# Patient Record
Sex: FEMALE | Race: BLACK OR AFRICAN AMERICAN | Employment: UNEMPLOYED | URBAN - METROPOLITAN AREA
[De-identification: names, ages, dates, MRNs, and addresses within clinical notes are randomized per-mention and may not be internally consistent; named-entity substitution may affect disease eponyms.]

---

## 2017-02-15 ENCOUNTER — TELEPHONE (OUTPATIENT)
Dept: INTERNAL MEDICINE | Age: 67
End: 2017-02-15

## 2017-02-15 ENCOUNTER — HOSPITAL ENCOUNTER (OUTPATIENT)
Dept: MAMMOGRAPHY | Age: 67
Discharge: HOME | End: 2017-02-15
Attending: INTERNAL MEDICINE | Admitting: INTERNAL MEDICINE

## 2017-02-15 DIAGNOSIS — Z12.31 VISIT FOR SCREENING MAMMOGRAM: ICD-10-CM

## 2017-02-17 ENCOUNTER — TELEPHONE (OUTPATIENT)
Dept: INTERNAL MEDICINE | Age: 67
End: 2017-02-17

## 2017-02-21 ENCOUNTER — OFFICE VISIT (OUTPATIENT)
Dept: INTERNAL MEDICINE | Age: 67
End: 2017-02-21

## 2017-02-21 VITALS
WEIGHT: 157 LBS | HEIGHT: 63 IN | DIASTOLIC BLOOD PRESSURE: 70 MMHG | BODY MASS INDEX: 27.82 KG/M2 | SYSTOLIC BLOOD PRESSURE: 138 MMHG

## 2017-02-21 DIAGNOSIS — I63.9 CEREBRAL INFARCTION, UNSPECIFIED MECHANISM (HCC): ICD-10-CM

## 2017-02-21 DIAGNOSIS — I10 ESSENTIAL HYPERTENSION: ICD-10-CM

## 2017-02-21 DIAGNOSIS — E78.2 MIXED HYPERLIPIDEMIA: ICD-10-CM

## 2017-02-21 DIAGNOSIS — Z00.00 WELL ADULT EXAM: Primary | ICD-10-CM

## 2017-02-21 DIAGNOSIS — I25.10 CORONARY ARTERY DISEASE INVOLVING NATIVE HEART WITHOUT ANGINA PECTORIS, UNSPECIFIED VESSEL OR LESION TYPE: ICD-10-CM

## 2017-02-21 DIAGNOSIS — E55.9 VITAMIN D DEFICIENCY: ICD-10-CM

## 2017-02-21 DIAGNOSIS — Z23 NEED FOR PNEUMOCOCCAL VACCINATION: ICD-10-CM

## 2017-02-21 LAB
A/G RATIO: 1.3 (ref 1.1–2.2)
ALBUMIN SERPL-MCNC: 4.3 G/DL (ref 3.4–5)
ALP BLD-CCNC: 98 U/L (ref 40–129)
ALT SERPL-CCNC: 10 U/L (ref 10–40)
ANION GAP SERPL CALCULATED.3IONS-SCNC: 14 MMOL/L (ref 3–16)
AST SERPL-CCNC: 16 U/L (ref 15–37)
BASOPHILS ABSOLUTE: 0.1 K/UL (ref 0–0.2)
BASOPHILS RELATIVE PERCENT: 1 %
BILIRUB SERPL-MCNC: <0.2 MG/DL (ref 0–1)
BILIRUBIN URINE: NEGATIVE
BLOOD, URINE: NEGATIVE
BUN BLDV-MCNC: 17 MG/DL (ref 7–20)
CALCIUM SERPL-MCNC: 9.8 MG/DL (ref 8.3–10.6)
CHLORIDE BLD-SCNC: 99 MMOL/L (ref 99–110)
CHOLESTEROL, TOTAL: 147 MG/DL (ref 0–199)
CLARITY: CLEAR
CO2: 27 MMOL/L (ref 21–32)
COLOR: YELLOW
CREAT SERPL-MCNC: 0.9 MG/DL (ref 0.6–1.2)
EOSINOPHILS ABSOLUTE: 0.1 K/UL (ref 0–0.6)
EOSINOPHILS RELATIVE PERCENT: 1 %
GFR AFRICAN AMERICAN: >60
GFR NON-AFRICAN AMERICAN: >60
GLOBULIN: 3.3 G/DL
GLUCOSE BLD-MCNC: 90 MG/DL (ref 70–99)
GLUCOSE URINE: NEGATIVE MG/DL
HCT VFR BLD CALC: 37.1 % (ref 36–48)
HDLC SERPL-MCNC: 55 MG/DL (ref 40–60)
HEMOGLOBIN: 12 G/DL (ref 12–16)
KETONES, URINE: NEGATIVE MG/DL
LDL CHOLESTEROL CALCULATED: 76 MG/DL
LEUKOCYTE ESTERASE, URINE: NEGATIVE
LYMPHOCYTES ABSOLUTE: 4.6 K/UL (ref 1–5.1)
LYMPHOCYTES RELATIVE PERCENT: 61 %
MCH RBC QN AUTO: 28.7 PG (ref 26–34)
MCHC RBC AUTO-ENTMCNC: 32.3 G/DL (ref 31–36)
MCV RBC AUTO: 88.7 FL (ref 80–100)
MICROSCOPIC EXAMINATION: NORMAL
MONOCYTES ABSOLUTE: 0.5 K/UL (ref 0–1.3)
MONOCYTES RELATIVE PERCENT: 7 %
NEUTROPHILS ABSOLUTE: 2.3 K/UL (ref 1.7–7.7)
NEUTROPHILS RELATIVE PERCENT: 30 %
NITRITE, URINE: NEGATIVE
PDW BLD-RTO: 14.8 % (ref 12.4–15.4)
PH UA: 6
PLATELET # BLD: 245 K/UL (ref 135–450)
PLATELET SLIDE REVIEW: ADEQUATE
PMV BLD AUTO: 7.6 FL (ref 5–10.5)
POTASSIUM SERPL-SCNC: 4.4 MMOL/L (ref 3.5–5.1)
PROTEIN UA: NEGATIVE MG/DL
RBC # BLD: 4.19 M/UL (ref 4–5.2)
RBC # BLD: NORMAL 10*6/UL
SLIDE REVIEW: NORMAL
SODIUM BLD-SCNC: 140 MMOL/L (ref 136–145)
SPECIFIC GRAVITY UA: 1.01
TOTAL PROTEIN: 7.6 G/DL (ref 6.4–8.2)
TRIGL SERPL-MCNC: 81 MG/DL (ref 0–150)
URINE TYPE: NORMAL
UROBILINOGEN, URINE: 0.2 E.U./DL
VLDLC SERPL CALC-MCNC: 16 MG/DL
WBC # BLD: 7.6 K/UL (ref 4–11)

## 2017-02-21 PROCEDURE — G0009 ADMIN PNEUMOCOCCAL VACCINE: HCPCS | Performed by: INTERNAL MEDICINE

## 2017-02-21 PROCEDURE — 93000 ELECTROCARDIOGRAM COMPLETE: CPT | Performed by: INTERNAL MEDICINE

## 2017-02-21 PROCEDURE — 90670 PCV13 VACCINE IM: CPT | Performed by: INTERNAL MEDICINE

## 2017-02-21 PROCEDURE — G0439 PPPS, SUBSEQ VISIT: HCPCS | Performed by: INTERNAL MEDICINE

## 2017-02-22 LAB
TSH SERPL DL<=0.05 MIU/L-ACNC: 1.21 UIU/ML (ref 0.27–4.2)
VITAMIN D 25-HYDROXY: 40.6 NG/ML

## 2017-03-01 DIAGNOSIS — E78.2 MIXED HYPERLIPIDEMIA: ICD-10-CM

## 2017-03-01 RX ORDER — SIMVASTATIN 40 MG
TABLET ORAL
Qty: 30 TABLET | Refills: 5 | Status: ON HOLD | OUTPATIENT
Start: 2017-03-01 | End: 2017-07-27 | Stop reason: HOSPADM

## 2017-05-02 DIAGNOSIS — I10 ESSENTIAL HYPERTENSION: ICD-10-CM

## 2017-05-02 RX ORDER — LISINOPRIL AND HYDROCHLOROTHIAZIDE 25; 20 MG/1; MG/1
TABLET ORAL
Qty: 90 TABLET | Refills: 0 | Status: SHIPPED | OUTPATIENT
Start: 2017-05-02 | End: 2017-08-14 | Stop reason: SDUPTHER

## 2017-05-30 ENCOUNTER — TELEPHONE (OUTPATIENT)
Dept: INTERNAL MEDICINE | Age: 67
End: 2017-05-30

## 2017-05-31 ENCOUNTER — TELEPHONE (OUTPATIENT)
Dept: INTERNAL MEDICINE | Age: 67
End: 2017-05-31

## 2017-06-06 ENCOUNTER — OFFICE VISIT (OUTPATIENT)
Dept: INTERNAL MEDICINE | Age: 67
End: 2017-06-06

## 2017-06-06 VITALS
WEIGHT: 148 LBS | DIASTOLIC BLOOD PRESSURE: 80 MMHG | BODY MASS INDEX: 25.27 KG/M2 | HEIGHT: 64 IN | SYSTOLIC BLOOD PRESSURE: 110 MMHG

## 2017-06-06 DIAGNOSIS — E78.2 MIXED HYPERLIPIDEMIA: ICD-10-CM

## 2017-06-06 DIAGNOSIS — M25.512 ACUTE PAIN OF LEFT SHOULDER: ICD-10-CM

## 2017-06-06 DIAGNOSIS — M54.5 LOW BACK PAIN, UNSPECIFIED BACK PAIN LATERALITY, UNSPECIFIED CHRONICITY, WITH SCIATICA PRESENCE UNSPECIFIED: ICD-10-CM

## 2017-06-06 DIAGNOSIS — I10 ESSENTIAL HYPERTENSION: Primary | ICD-10-CM

## 2017-06-06 PROCEDURE — 99213 OFFICE O/P EST LOW 20 MIN: CPT | Performed by: INTERNAL MEDICINE

## 2017-06-06 RX ORDER — DICLOFENAC SODIUM 75 MG/1
75 TABLET, DELAYED RELEASE ORAL 2 TIMES DAILY
Qty: 30 TABLET | Refills: 0 | Status: ON HOLD | OUTPATIENT
Start: 2017-06-06 | End: 2017-07-11 | Stop reason: HOSPADM

## 2017-06-14 ENCOUNTER — TELEPHONE (OUTPATIENT)
Dept: INTERNAL MEDICINE | Age: 67
End: 2017-06-14

## 2017-07-06 ENCOUNTER — OFFICE VISIT (OUTPATIENT)
Dept: INTERNAL MEDICINE | Age: 67
End: 2017-07-06

## 2017-07-06 ENCOUNTER — HOSPITAL ENCOUNTER (OUTPATIENT)
Dept: CT IMAGING | Age: 67
Discharge: HOME | End: 2017-07-06
Attending: INTERNAL MEDICINE | Admitting: INTERNAL MEDICINE

## 2017-07-06 VITALS
OXYGEN SATURATION: 94 % | HEIGHT: 63 IN | HEART RATE: 71 BPM | SYSTOLIC BLOOD PRESSURE: 118 MMHG | DIASTOLIC BLOOD PRESSURE: 74 MMHG | WEIGHT: 133 LBS | BODY MASS INDEX: 23.57 KG/M2

## 2017-07-06 DIAGNOSIS — R53.1 WEAKNESS: ICD-10-CM

## 2017-07-06 DIAGNOSIS — R06.02 SOB (SHORTNESS OF BREATH): ICD-10-CM

## 2017-07-06 DIAGNOSIS — R06.02 SHORTNESS OF BREATH: ICD-10-CM

## 2017-07-06 DIAGNOSIS — R06.02 SOB (SHORTNESS OF BREATH): Primary | ICD-10-CM

## 2017-07-06 DIAGNOSIS — F17.200 HEAVY SMOKER: ICD-10-CM

## 2017-07-06 DIAGNOSIS — R63.0 LOSS OF APPETITE: ICD-10-CM

## 2017-07-06 LAB
A/G RATIO: 0.8 (ref 1.1–2.2)
ALBUMIN SERPL-MCNC: 3.8 G/DL (ref 3.4–5)
ALP BLD-CCNC: 89 U/L (ref 40–129)
ALT SERPL-CCNC: 13 U/L (ref 10–40)
ANION GAP SERPL CALCULATED.3IONS-SCNC: 19 MMOL/L (ref 3–16)
AST SERPL-CCNC: 15 U/L (ref 15–37)
BASOPHILS ABSOLUTE: 0 K/UL (ref 0–0.2)
BASOPHILS RELATIVE PERCENT: 0.4 %
BILIRUB SERPL-MCNC: <0.2 MG/DL (ref 0–1)
BUN BLDV-MCNC: 38 MG/DL (ref 7–20)
CALCIUM SERPL-MCNC: 9.8 MG/DL (ref 8.3–10.6)
CHLORIDE BLD-SCNC: 94 MMOL/L (ref 99–110)
CO2: 22 MMOL/L (ref 21–32)
CREAT SERPL-MCNC: 1.2 MG/DL (ref 0.6–1.2)
EOSINOPHILS ABSOLUTE: 0 K/UL (ref 0–0.6)
EOSINOPHILS RELATIVE PERCENT: 0.3 %
GFR AFRICAN AMERICAN: 54
GFR NON-AFRICAN AMERICAN: 44
GLOBULIN: 4.6 G/DL
GLUCOSE BLD-MCNC: 92 MG/DL (ref 70–99)
HCT VFR BLD CALC: 21.7 % (ref 36–48)
HEMOGLOBIN: 7 G/DL (ref 12–16)
LYMPHOCYTES ABSOLUTE: 2.7 K/UL (ref 1–5.1)
LYMPHOCYTES RELATIVE PERCENT: 29.2 %
MCH RBC QN AUTO: 27.9 PG (ref 26–34)
MCHC RBC AUTO-ENTMCNC: 32.1 G/DL (ref 31–36)
MCV RBC AUTO: 86.7 FL (ref 80–100)
MONOCYTES ABSOLUTE: 0.7 K/UL (ref 0–1.3)
MONOCYTES RELATIVE PERCENT: 7.2 %
NEUTROPHILS ABSOLUTE: 5.8 K/UL (ref 1.7–7.7)
NEUTROPHILS RELATIVE PERCENT: 62.9 %
PDW BLD-RTO: 14.8 % (ref 12.4–15.4)
PLATELET # BLD: 537 K/UL (ref 135–450)
PMV BLD AUTO: 6.5 FL (ref 5–10.5)
POTASSIUM SERPL-SCNC: 4.7 MMOL/L (ref 3.5–5.1)
RBC # BLD: 2.5 M/UL (ref 4–5.2)
SODIUM BLD-SCNC: 135 MMOL/L (ref 136–145)
TOTAL PROTEIN: 8.4 G/DL (ref 6.4–8.2)
TSH SERPL DL<=0.05 MIU/L-ACNC: 1.26 UIU/ML (ref 0.27–4.2)
WBC # BLD: 9.2 K/UL (ref 4–11)

## 2017-07-06 PROCEDURE — 99213 OFFICE O/P EST LOW 20 MIN: CPT | Performed by: INTERNAL MEDICINE

## 2017-07-06 ASSESSMENT — ENCOUNTER SYMPTOMS
BACK PAIN: 0
BLOOD IN STOOL: 0
VOMITING: 0
NAUSEA: 0
SHORTNESS OF BREATH: 1
ABDOMINAL PAIN: 0
CHEST TIGHTNESS: 0
DIARRHEA: 0
COUGH: 0

## 2017-07-06 ASSESSMENT — PATIENT HEALTH QUESTIONNAIRE - PHQ9
2. FEELING DOWN, DEPRESSED OR HOPELESS: 0
SUM OF ALL RESPONSES TO PHQ QUESTIONS 1-9: 0
SUM OF ALL RESPONSES TO PHQ9 QUESTIONS 1 & 2: 0
1. LITTLE INTEREST OR PLEASURE IN DOING THINGS: 0

## 2017-07-07 ENCOUNTER — TELEPHONE (OUTPATIENT)
Dept: INTERNAL MEDICINE | Age: 67
End: 2017-07-07

## 2017-07-07 PROBLEM — K92.0 GASTROINTESTINAL HEMORRHAGE WITH HEMATEMESIS: Status: ACTIVE | Noted: 2017-07-07

## 2017-07-13 ENCOUNTER — TELEPHONE (OUTPATIENT)
Dept: INTERNAL MEDICINE | Age: 67
End: 2017-07-13

## 2017-07-14 ENCOUNTER — TELEPHONE (OUTPATIENT)
Dept: INTERNAL MEDICINE | Age: 67
End: 2017-07-14

## 2017-07-21 ENCOUNTER — OFFICE VISIT (OUTPATIENT)
Dept: INTERNAL MEDICINE | Age: 67
End: 2017-07-21

## 2017-07-21 ENCOUNTER — HOSPITAL ENCOUNTER (OUTPATIENT)
Dept: CT IMAGING | Age: 67
Discharge: HOME | End: 2017-07-21
Attending: INTERNAL MEDICINE | Admitting: INTERNAL MEDICINE

## 2017-07-21 VITALS
OXYGEN SATURATION: 90 % | HEART RATE: 94 BPM | SYSTOLIC BLOOD PRESSURE: 118 MMHG | BODY MASS INDEX: 21.51 KG/M2 | HEIGHT: 64 IN | DIASTOLIC BLOOD PRESSURE: 68 MMHG | WEIGHT: 126 LBS

## 2017-07-21 DIAGNOSIS — R06.09 EXERTIONAL DYSPNEA: ICD-10-CM

## 2017-07-21 DIAGNOSIS — R10.31 RIGHT LOWER QUADRANT ABDOMINAL PAIN: ICD-10-CM

## 2017-07-21 DIAGNOSIS — R10.31 RLQ ABDOMINAL PAIN: ICD-10-CM

## 2017-07-21 DIAGNOSIS — R10.31 RLQ ABDOMINAL PAIN: Primary | ICD-10-CM

## 2017-07-21 DIAGNOSIS — K59.09 OTHER CONSTIPATION: ICD-10-CM

## 2017-07-21 DIAGNOSIS — R63.0 LOSS OF APPETITE: ICD-10-CM

## 2017-07-21 DIAGNOSIS — R10.31 RIGHT LOWER QUADRANT PAIN: ICD-10-CM

## 2017-07-21 DIAGNOSIS — R63.4 LOSS OF WEIGHT: ICD-10-CM

## 2017-07-21 DIAGNOSIS — R06.09 DYSPNEA ON EXERTION: ICD-10-CM

## 2017-07-21 DIAGNOSIS — Z91.81 AT HIGH RISK FOR FALLS: ICD-10-CM

## 2017-07-21 LAB
A/G RATIO: 0.7 (ref 1.1–2.2)
ALBUMIN SERPL-MCNC: 3.7 G/DL (ref 3.4–5)
ALP BLD-CCNC: 83 U/L (ref 40–129)
ALT SERPL-CCNC: 13 U/L (ref 10–40)
ANION GAP SERPL CALCULATED.3IONS-SCNC: 21 MMOL/L (ref 3–16)
AST SERPL-CCNC: 24 U/L (ref 15–37)
BASOPHILS ABSOLUTE: 0 K/UL (ref 0–0.2)
BASOPHILS RELATIVE PERCENT: 0.4 %
BILIRUB SERPL-MCNC: <0.2 MG/DL (ref 0–1)
BUN BLDV-MCNC: 36 MG/DL (ref 7–20)
CALCIUM SERPL-MCNC: 10.2 MG/DL (ref 8.3–10.6)
CHLORIDE BLD-SCNC: 94 MMOL/L (ref 99–110)
CO2: 23 MMOL/L (ref 21–32)
CREAT SERPL-MCNC: 1.2 MG/DL (ref 0.6–1.2)
EOSINOPHILS ABSOLUTE: 0.1 K/UL (ref 0–0.6)
EOSINOPHILS RELATIVE PERCENT: 0.5 %
GFR AFRICAN AMERICAN: 54
GFR NON-AFRICAN AMERICAN: 44
GLOBULIN: 5 G/DL
GLUCOSE BLD-MCNC: 92 MG/DL (ref 70–99)
HCT VFR BLD CALC: 26.7 % (ref 36–48)
HEMOGLOBIN: 8.5 G/DL (ref 12–16)
LIPASE: 10 U/L (ref 13–60)
LYMPHOCYTES ABSOLUTE: 3 K/UL (ref 1–5.1)
LYMPHOCYTES RELATIVE PERCENT: 25.4 %
MAGNESIUM: 2.2 MG/DL (ref 1.8–2.4)
MCH RBC QN AUTO: 27.8 PG (ref 26–34)
MCHC RBC AUTO-ENTMCNC: 31.7 G/DL (ref 31–36)
MCV RBC AUTO: 87.7 FL (ref 80–100)
MONOCYTES ABSOLUTE: 0.9 K/UL (ref 0–1.3)
MONOCYTES RELATIVE PERCENT: 7.7 %
NEUTROPHILS ABSOLUTE: 7.7 K/UL (ref 1.7–7.7)
NEUTROPHILS RELATIVE PERCENT: 66 %
PDW BLD-RTO: 15.6 % (ref 12.4–15.4)
PLATELET # BLD: 476 K/UL (ref 135–450)
PMV BLD AUTO: 7.5 FL (ref 5–10.5)
POTASSIUM SERPL-SCNC: 5.1 MMOL/L (ref 3.5–5.1)
RBC # BLD: 3.04 M/UL (ref 4–5.2)
SODIUM BLD-SCNC: 138 MMOL/L (ref 136–145)
TOTAL PROTEIN: 8.7 G/DL (ref 6.4–8.2)
WBC # BLD: 11.7 K/UL (ref 4–11)

## 2017-07-21 PROCEDURE — 99213 OFFICE O/P EST LOW 20 MIN: CPT | Performed by: INTERNAL MEDICINE

## 2017-07-21 RX ORDER — METRONIDAZOLE 500 MG/1
500 TABLET ORAL 3 TIMES DAILY
Qty: 30 TABLET | Refills: 0 | Status: ON HOLD | OUTPATIENT
Start: 2017-07-21 | End: 2017-07-27

## 2017-07-21 RX ORDER — CIPROFLOXACIN 500 MG/1
500 TABLET, FILM COATED ORAL 2 TIMES DAILY
Qty: 20 TABLET | Refills: 0 | Status: ON HOLD | OUTPATIENT
Start: 2017-07-21 | End: 2017-07-27

## 2017-07-21 RX ORDER — TRAMADOL HYDROCHLORIDE 50 MG/1
50 TABLET ORAL EVERY 12 HOURS PRN
Qty: 16 TABLET | Refills: 0 | Status: SHIPPED | OUTPATIENT
Start: 2017-07-21 | End: 2017-07-31

## 2017-07-21 ASSESSMENT — ENCOUNTER SYMPTOMS
NAUSEA: 0
ABDOMINAL PAIN: 0
SORE THROAT: 0
BLOOD IN STOOL: 0
DIARRHEA: 0
COUGH: 0
SHORTNESS OF BREATH: 1
VOMITING: 0
CHEST TIGHTNESS: 0

## 2017-07-22 ENCOUNTER — HOSPITAL ENCOUNTER (OUTPATIENT)
Dept: CT IMAGING | Age: 67
Discharge: HOME | End: 2017-07-22
Attending: INTERNAL MEDICINE | Admitting: INTERNAL MEDICINE

## 2017-07-22 DIAGNOSIS — R10.31 RIGHT LOWER QUADRANT PAIN: ICD-10-CM

## 2017-07-22 DIAGNOSIS — R63.0 LOSS OF APPETITE: ICD-10-CM

## 2017-07-22 DIAGNOSIS — R10.31 RIGHT LOWER QUADRANT ABDOMINAL PAIN: ICD-10-CM

## 2017-07-22 DIAGNOSIS — R06.09 DYSPNEA ON EXERTION: ICD-10-CM

## 2017-07-22 DIAGNOSIS — R10.31 RLQ ABDOMINAL PAIN: ICD-10-CM

## 2017-07-22 DIAGNOSIS — R63.4 LOSS OF WEIGHT: ICD-10-CM

## 2017-07-23 PROBLEM — R63.4 LOSS OF WEIGHT: Status: ACTIVE | Noted: 2017-07-23

## 2017-07-27 PROBLEM — K57.33 DIVERTICULITIS OF LARGE INTESTINE WITHOUT PERFORATION OR ABSCESS WITH BLEEDING: Status: ACTIVE | Noted: 2017-07-27

## 2017-07-27 PROBLEM — D50.9 IRON DEFICIENCY ANEMIA: Status: ACTIVE | Noted: 2017-07-27

## 2017-07-28 ENCOUNTER — TELEPHONE (OUTPATIENT)
Dept: INTERNAL MEDICINE | Age: 67
End: 2017-07-28

## 2017-07-28 ENCOUNTER — TELEPHONE (OUTPATIENT)
Dept: PHARMACY | Facility: CLINIC | Age: 67
End: 2017-07-28

## 2017-07-28 ENCOUNTER — CARE COORDINATION (OUTPATIENT)
Dept: CASE MANAGEMENT | Age: 67
End: 2017-07-28

## 2017-07-28 RX ORDER — PROMETHAZINE HYDROCHLORIDE 12.5 MG/1
12.5 TABLET ORAL EVERY 6 HOURS PRN
Qty: 20 TABLET | Refills: 0 | Status: SHIPPED | OUTPATIENT
Start: 2017-07-28 | End: 2017-08-04

## 2017-07-31 ENCOUNTER — CARE COORDINATION (OUTPATIENT)
Dept: CASE MANAGEMENT | Age: 67
End: 2017-07-31

## 2017-08-03 ENCOUNTER — CARE COORDINATION (OUTPATIENT)
Dept: CASE MANAGEMENT | Age: 67
End: 2017-08-03

## 2017-08-08 ENCOUNTER — TELEPHONE (OUTPATIENT)
Dept: INTERNAL MEDICINE | Age: 67
End: 2017-08-08

## 2017-08-08 RX ORDER — FLUCONAZOLE 150 MG/1
150 TABLET ORAL SEE ADMIN INSTRUCTIONS
Qty: 2 TABLET | Refills: 3 | Status: SHIPPED | OUTPATIENT
Start: 2017-08-08 | End: 2017-08-09

## 2017-08-14 DIAGNOSIS — I10 ESSENTIAL HYPERTENSION: ICD-10-CM

## 2017-08-14 RX ORDER — LISINOPRIL AND HYDROCHLOROTHIAZIDE 25; 20 MG/1; MG/1
TABLET ORAL
Qty: 90 TABLET | Refills: 0 | Status: SHIPPED | OUTPATIENT
Start: 2017-08-14 | End: 2017-11-13 | Stop reason: SDUPTHER

## 2017-11-13 DIAGNOSIS — I10 ESSENTIAL HYPERTENSION: ICD-10-CM

## 2017-11-14 RX ORDER — LISINOPRIL AND HYDROCHLOROTHIAZIDE 25; 20 MG/1; MG/1
TABLET ORAL
Qty: 90 TABLET | Refills: 0 | Status: SHIPPED | OUTPATIENT
Start: 2017-11-14 | End: 2018-02-13 | Stop reason: SDUPTHER

## 2017-11-30 ENCOUNTER — TELEPHONE (OUTPATIENT)
Dept: INTERNAL MEDICINE | Age: 67
End: 2017-11-30

## 2017-11-30 RX ORDER — AMLODIPINE BESYLATE 5 MG/1
5 TABLET ORAL DAILY
Qty: 30 TABLET | Refills: 3 | Status: SHIPPED | OUTPATIENT
Start: 2017-11-30 | End: 2018-03-28 | Stop reason: SDUPTHER

## 2017-11-30 RX ORDER — ATORVASTATIN CALCIUM 40 MG/1
40 TABLET, FILM COATED ORAL NIGHTLY
Qty: 30 TABLET | Refills: 3 | Status: SHIPPED | OUTPATIENT
Start: 2017-11-30 | End: 2018-03-26 | Stop reason: SDUPTHER

## 2017-11-30 NOTE — TELEPHONE ENCOUNTER
Pt needs refill on:  amLODIPine (NORVASC) 5 MG tablet   atorvastatin (LIPITOR) 40 MG tablet . Pharmacy on file verified.   Please call

## 2017-12-13 ENCOUNTER — NURSE ONLY (OUTPATIENT)
Dept: INTERNAL MEDICINE | Age: 67
End: 2017-12-13

## 2017-12-13 DIAGNOSIS — Z23 NEED FOR INFLUENZA VACCINATION: Primary | ICD-10-CM

## 2017-12-13 PROCEDURE — G0008 ADMIN INFLUENZA VIRUS VAC: HCPCS | Performed by: INTERNAL MEDICINE

## 2017-12-13 PROCEDURE — 90662 IIV NO PRSV INCREASED AG IM: CPT | Performed by: INTERNAL MEDICINE

## 2017-12-14 NOTE — PROGRESS NOTES
Vaccine Information Sheet, \"Influenza - Inactivated\"  given to Pinky Casiano, or parent/legal guardian of  Pinky Casiano and verbalized understanding. Patient responses:    Have you ever had a reaction to a flu vaccine? No  Are you able to eat eggs without adverse effects? Yes  Do you have any current illness? No  Have you ever had [place] Syndrome? No    Flu vaccine given per order. Please see immunization tab.

## 2018-02-13 DIAGNOSIS — I10 ESSENTIAL HYPERTENSION: ICD-10-CM

## 2018-02-13 RX ORDER — LISINOPRIL AND HYDROCHLOROTHIAZIDE 25; 20 MG/1; MG/1
TABLET ORAL
Qty: 90 TABLET | Refills: 0 | Status: SHIPPED | OUTPATIENT
Start: 2018-02-13 | End: 2018-05-16 | Stop reason: SDUPTHER

## 2018-03-26 RX ORDER — ATORVASTATIN CALCIUM 40 MG/1
40 TABLET, FILM COATED ORAL NIGHTLY
Qty: 90 TABLET | Refills: 0 | Status: SHIPPED | OUTPATIENT
Start: 2018-03-26 | End: 2018-06-18 | Stop reason: SDUPTHER

## 2018-03-29 RX ORDER — AMLODIPINE BESYLATE 5 MG/1
TABLET ORAL
Qty: 30 TABLET | Refills: 3 | Status: SHIPPED | OUTPATIENT
Start: 2018-03-29 | End: 2018-07-26 | Stop reason: SDUPTHER

## 2018-05-16 DIAGNOSIS — I10 ESSENTIAL HYPERTENSION: ICD-10-CM

## 2018-05-16 RX ORDER — LISINOPRIL AND HYDROCHLOROTHIAZIDE 25; 20 MG/1; MG/1
TABLET ORAL
Qty: 90 TABLET | Refills: 0 | Status: SHIPPED | OUTPATIENT
Start: 2018-05-16 | End: 2018-06-18 | Stop reason: SDUPTHER

## 2018-06-18 ENCOUNTER — TELEPHONE (OUTPATIENT)
Dept: INTERNAL MEDICINE | Age: 68
End: 2018-06-18

## 2018-06-18 DIAGNOSIS — I10 ESSENTIAL HYPERTENSION: ICD-10-CM

## 2018-06-18 RX ORDER — LISINOPRIL AND HYDROCHLOROTHIAZIDE 25; 20 MG/1; MG/1
TABLET ORAL
Qty: 90 TABLET | Refills: 0 | Status: SHIPPED | OUTPATIENT
Start: 2018-06-18 | End: 2018-11-08 | Stop reason: SDUPTHER

## 2018-06-18 RX ORDER — ATORVASTATIN CALCIUM 40 MG/1
40 TABLET, FILM COATED ORAL NIGHTLY
Qty: 90 TABLET | Refills: 0 | Status: SHIPPED | OUTPATIENT
Start: 2018-06-18 | End: 2018-09-27 | Stop reason: SDUPTHER

## 2018-07-26 ENCOUNTER — OFFICE VISIT (OUTPATIENT)
Dept: INTERNAL MEDICINE | Age: 68
End: 2018-07-26

## 2018-07-26 VITALS
BODY MASS INDEX: 25.44 KG/M2 | DIASTOLIC BLOOD PRESSURE: 76 MMHG | SYSTOLIC BLOOD PRESSURE: 120 MMHG | WEIGHT: 149 LBS | HEIGHT: 64 IN

## 2018-07-26 DIAGNOSIS — R53.83 FATIGUE, UNSPECIFIED TYPE: ICD-10-CM

## 2018-07-26 DIAGNOSIS — I10 ESSENTIAL HYPERTENSION: ICD-10-CM

## 2018-07-26 DIAGNOSIS — E78.00 PURE HYPERCHOLESTEROLEMIA: Primary | ICD-10-CM

## 2018-07-26 DIAGNOSIS — E78.00 PURE HYPERCHOLESTEROLEMIA: ICD-10-CM

## 2018-07-26 LAB
A/G RATIO: 1.5 (ref 1.1–2.2)
ALBUMIN SERPL-MCNC: 4.5 G/DL (ref 3.4–5)
ALP BLD-CCNC: 112 U/L (ref 40–129)
ALT SERPL-CCNC: 11 U/L (ref 10–40)
ANION GAP SERPL CALCULATED.3IONS-SCNC: 13 MMOL/L (ref 3–16)
AST SERPL-CCNC: 18 U/L (ref 15–37)
BASOPHILS ABSOLUTE: 0 K/UL (ref 0–0.2)
BASOPHILS RELATIVE PERCENT: 0 %
BILIRUB SERPL-MCNC: <0.2 MG/DL (ref 0–1)
BUN BLDV-MCNC: 18 MG/DL (ref 7–20)
CALCIUM SERPL-MCNC: 9.7 MG/DL (ref 8.3–10.6)
CHLORIDE BLD-SCNC: 102 MMOL/L (ref 99–110)
CHOLESTEROL, TOTAL: 129 MG/DL (ref 0–199)
CO2: 25 MMOL/L (ref 21–32)
CREAT SERPL-MCNC: 1 MG/DL (ref 0.6–1.2)
CRENATED RBC'S: ABNORMAL
EOSINOPHILS ABSOLUTE: 0 K/UL (ref 0–0.6)
EOSINOPHILS RELATIVE PERCENT: 0 %
GFR AFRICAN AMERICAN: >60
GFR NON-AFRICAN AMERICAN: 55
GLOBULIN: 3.1 G/DL
GLUCOSE BLD-MCNC: 122 MG/DL (ref 70–99)
HCT VFR BLD CALC: 35 % (ref 36–48)
HDLC SERPL-MCNC: 55 MG/DL (ref 40–60)
HEMOGLOBIN: 11.6 G/DL (ref 12–16)
LDL CHOLESTEROL CALCULATED: 61 MG/DL
LYMPHOCYTES ABSOLUTE: 3.9 K/UL (ref 1–5.1)
LYMPHOCYTES RELATIVE PERCENT: 62 %
MCH RBC QN AUTO: 29.8 PG (ref 26–34)
MCHC RBC AUTO-ENTMCNC: 33 G/DL (ref 31–36)
MCV RBC AUTO: 90.1 FL (ref 80–100)
MONOCYTES ABSOLUTE: 0.5 K/UL (ref 0–1.3)
MONOCYTES RELATIVE PERCENT: 8 %
NEUTROPHILS ABSOLUTE: 1.9 K/UL (ref 1.7–7.7)
NEUTROPHILS RELATIVE PERCENT: 30 %
PDW BLD-RTO: 14.4 % (ref 12.4–15.4)
PLATELET # BLD: 217 K/UL (ref 135–450)
PLATELET SLIDE REVIEW: ADEQUATE
PMV BLD AUTO: 8 FL (ref 5–10.5)
POTASSIUM SERPL-SCNC: 4.6 MMOL/L (ref 3.5–5.1)
RBC # BLD: 3.88 M/UL (ref 4–5.2)
SLIDE REVIEW: ABNORMAL
SODIUM BLD-SCNC: 140 MMOL/L (ref 136–145)
TOTAL PROTEIN: 7.6 G/DL (ref 6.4–8.2)
TRIGL SERPL-MCNC: 66 MG/DL (ref 0–150)
VLDLC SERPL CALC-MCNC: 13 MG/DL
WBC # BLD: 6.3 K/UL (ref 4–11)

## 2018-07-26 PROCEDURE — 4004F PT TOBACCO SCREEN RCVD TLK: CPT | Performed by: INTERNAL MEDICINE

## 2018-07-26 PROCEDURE — 99213 OFFICE O/P EST LOW 20 MIN: CPT | Performed by: INTERNAL MEDICINE

## 2018-07-26 PROCEDURE — 3017F COLORECTAL CA SCREEN DOC REV: CPT | Performed by: INTERNAL MEDICINE

## 2018-07-26 PROCEDURE — G8399 PT W/DXA RESULTS DOCUMENT: HCPCS | Performed by: INTERNAL MEDICINE

## 2018-07-26 PROCEDURE — 1123F ACP DISCUSS/DSCN MKR DOCD: CPT | Performed by: INTERNAL MEDICINE

## 2018-07-26 PROCEDURE — G8419 CALC BMI OUT NRM PARAM NOF/U: HCPCS | Performed by: INTERNAL MEDICINE

## 2018-07-26 PROCEDURE — 96372 THER/PROPH/DIAG INJ SC/IM: CPT | Performed by: INTERNAL MEDICINE

## 2018-07-26 PROCEDURE — G8599 NO ASA/ANTIPLAT THER USE RNG: HCPCS | Performed by: INTERNAL MEDICINE

## 2018-07-26 PROCEDURE — 1101F PT FALLS ASSESS-DOCD LE1/YR: CPT | Performed by: INTERNAL MEDICINE

## 2018-07-26 PROCEDURE — 1090F PRES/ABSN URINE INCON ASSESS: CPT | Performed by: INTERNAL MEDICINE

## 2018-07-26 PROCEDURE — 4040F PNEUMOC VAC/ADMIN/RCVD: CPT | Performed by: INTERNAL MEDICINE

## 2018-07-26 PROCEDURE — G8427 DOCREV CUR MEDS BY ELIG CLIN: HCPCS | Performed by: INTERNAL MEDICINE

## 2018-07-26 RX ORDER — CYANOCOBALAMIN 1000 UG/ML
1000 INJECTION INTRAMUSCULAR; SUBCUTANEOUS ONCE
Status: COMPLETED | OUTPATIENT
Start: 2018-07-26 | End: 2018-07-26

## 2018-07-26 RX ORDER — AMLODIPINE BESYLATE 5 MG/1
TABLET ORAL
Qty: 90 TABLET | Refills: 3 | Status: SHIPPED | OUTPATIENT
Start: 2018-07-26 | End: 2019-07-24 | Stop reason: SDUPTHER

## 2018-07-26 RX ADMIN — CYANOCOBALAMIN 1000 MCG: 1000 INJECTION INTRAMUSCULAR; SUBCUTANEOUS at 15:49

## 2018-07-26 NOTE — PROGRESS NOTES
Topics    Smoking status: Current Every Day Smoker     Packs/day: 0.50     Years: 45.00    Smokeless tobacco: Never Used      Comment: smokes one cig per day     Alcohol use No    Drug use: No    Sexual activity: No     Other Topics Concern    None     Social History Narrative    None         Allergies:  Patient has no known allergies. Current Medications:    Prior to Admission medications    Medication Sig Start Date End Date Taking? Authorizing Provider   amLODIPine (NORVASC) 5 MG tablet TAKE ONE TABLET BY MOUTH ONCE DAILY 7/26/18  Yes Serge Seymour MD   metoprolol tartrate (LOPRESSOR) 25 MG tablet TAKE 1 TABLET BY MOUTH TWICE DAILY 6/18/18  Yes Serge Seymour MD   lisinopril-hydrochlorothiazide (PRINZIDE;ZESTORETIC) 20-25 MG per tablet TAKE 1 TABLET BY MOUTH ONCE DAILY 6/18/18  Yes Serge Seymour MD   atorvastatin (LIPITOR) 40 MG tablet Take 1 tablet by mouth nightly 6/18/18  Yes Serge Seymour MD       Physical Exam:  Vital Signs: /76   Ht 5' 4\" (1.626 m)   Wt 149 lb (67.6 kg)   BMI 25.58 kg/m²   General: Patient appears  non-toxic  HENT: Atraumatic, normocephalic, oral mucosa moist  Lungs:  Clear bilaterally  Heart: Regular rate and rhythm  Abdomen: Non-distended, soft, non-tender  Extremities: No edema  Neuro: Nonfocal    Medical Decision Making and Plan:  Pertinent Labs & Imaging studies reviewed. (See chart for details)  Fasting blood studies are pending    1. Essential hypertension  This problem is stable continue present meds  - CBC Auto Differential; Future  - Comprehensive Metabolic Panel; Future  - Lipid Panel; Future    2. Pure hypercholesterolemia  Problem is stable check above labs continue present meds for now  - CBC Auto Differential; Future  - Comprehensive Metabolic Panel; Future  - Lipid Panel;  Future

## 2018-07-27 DIAGNOSIS — D64.9 ANEMIA, UNSPECIFIED TYPE: ICD-10-CM

## 2018-07-27 DIAGNOSIS — D64.9 ANEMIA, UNSPECIFIED TYPE: Primary | ICD-10-CM

## 2018-07-27 LAB
IRON SATURATION: 22 % (ref 15–50)
IRON: 70 UG/DL (ref 37–145)
TOTAL IRON BINDING CAPACITY: 320 UG/DL (ref 260–445)

## 2018-09-27 RX ORDER — ATORVASTATIN CALCIUM 40 MG/1
40 TABLET, FILM COATED ORAL NIGHTLY
Qty: 90 TABLET | Refills: 2 | Status: SHIPPED | OUTPATIENT
Start: 2018-09-27 | End: 2019-06-26 | Stop reason: SDUPTHER

## 2018-10-30 ENCOUNTER — NURSE ONLY (OUTPATIENT)
Dept: INTERNAL MEDICINE CLINIC | Age: 68
End: 2018-10-30
Payer: MEDICARE

## 2018-10-30 DIAGNOSIS — Z23 NEED FOR INFLUENZA VACCINATION: Primary | ICD-10-CM

## 2018-10-30 PROCEDURE — G0008 ADMIN INFLUENZA VIRUS VAC: HCPCS | Performed by: INTERNAL MEDICINE

## 2018-10-30 PROCEDURE — 90662 IIV NO PRSV INCREASED AG IM: CPT | Performed by: INTERNAL MEDICINE

## 2018-10-30 ASSESSMENT — PATIENT HEALTH QUESTIONNAIRE - PHQ9
SUM OF ALL RESPONSES TO PHQ9 QUESTIONS 1 & 2: 0
1. LITTLE INTEREST OR PLEASURE IN DOING THINGS: 0
SUM OF ALL RESPONSES TO PHQ QUESTIONS 1-9: 0
2. FEELING DOWN, DEPRESSED OR HOPELESS: 0
SUM OF ALL RESPONSES TO PHQ QUESTIONS 1-9: 0

## 2018-11-08 DIAGNOSIS — I10 ESSENTIAL HYPERTENSION: ICD-10-CM

## 2018-11-08 RX ORDER — LISINOPRIL AND HYDROCHLOROTHIAZIDE 25; 20 MG/1; MG/1
TABLET ORAL
Qty: 90 TABLET | Refills: 0 | Status: SHIPPED | OUTPATIENT
Start: 2018-11-08 | End: 2019-02-07 | Stop reason: SDUPTHER

## 2019-01-28 ENCOUNTER — OFFICE VISIT (OUTPATIENT)
Dept: INTERNAL MEDICINE CLINIC | Age: 69
End: 2019-01-28
Payer: MEDICARE

## 2019-01-28 VITALS
HEIGHT: 64 IN | BODY MASS INDEX: 25.61 KG/M2 | DIASTOLIC BLOOD PRESSURE: 68 MMHG | SYSTOLIC BLOOD PRESSURE: 110 MMHG | WEIGHT: 150 LBS

## 2019-01-28 DIAGNOSIS — I25.10 CORONARY ARTERY DISEASE INVOLVING NATIVE HEART WITHOUT ANGINA PECTORIS, UNSPECIFIED VESSEL OR LESION TYPE: ICD-10-CM

## 2019-01-28 DIAGNOSIS — Z00.00 PE (PHYSICAL EXAM), ANNUAL: Primary | ICD-10-CM

## 2019-01-28 DIAGNOSIS — I10 ESSENTIAL HYPERTENSION: ICD-10-CM

## 2019-01-28 DIAGNOSIS — E55.9 VITAMIN D DEFICIENCY: ICD-10-CM

## 2019-01-28 DIAGNOSIS — Z00.00 WELLNESS EXAMINATION: ICD-10-CM

## 2019-01-28 DIAGNOSIS — Z00.00 PE (PHYSICAL EXAM), ANNUAL: ICD-10-CM

## 2019-01-28 DIAGNOSIS — D50.8 OTHER IRON DEFICIENCY ANEMIA: ICD-10-CM

## 2019-01-28 PROBLEM — R63.4 ABNORMAL LOSS OF WEIGHT: Status: RESOLVED | Noted: 2017-07-23 | Resolved: 2019-01-28

## 2019-01-28 PROBLEM — K92.0 GASTROINTESTINAL HEMORRHAGE WITH HEMATEMESIS: Status: RESOLVED | Noted: 2017-07-07 | Resolved: 2019-01-28

## 2019-01-28 LAB
A/G RATIO: 1.3 (ref 1.1–2.2)
ALBUMIN SERPL-MCNC: 4.5 G/DL (ref 3.4–5)
ALP BLD-CCNC: 115 U/L (ref 40–129)
ALT SERPL-CCNC: 10 U/L (ref 10–40)
ANION GAP SERPL CALCULATED.3IONS-SCNC: 14 MMOL/L (ref 3–16)
AST SERPL-CCNC: 16 U/L (ref 15–37)
BASOPHILS ABSOLUTE: 0 K/UL (ref 0–0.2)
BASOPHILS RELATIVE PERCENT: 0.5 %
BILIRUB SERPL-MCNC: 0.3 MG/DL (ref 0–1)
BILIRUBIN URINE: NEGATIVE
BLOOD, URINE: NEGATIVE
BUN BLDV-MCNC: 15 MG/DL (ref 7–20)
CALCIUM SERPL-MCNC: 9.9 MG/DL (ref 8.3–10.6)
CHLORIDE BLD-SCNC: 102 MMOL/L (ref 99–110)
CHOLESTEROL, TOTAL: 127 MG/DL (ref 0–199)
CLARITY: CLEAR
CO2: 25 MMOL/L (ref 21–32)
COLOR: YELLOW
CREAT SERPL-MCNC: 1 MG/DL (ref 0.6–1.2)
EOSINOPHILS ABSOLUTE: 0 K/UL (ref 0–0.6)
EOSINOPHILS RELATIVE PERCENT: 0.8 %
GFR AFRICAN AMERICAN: >60
GFR NON-AFRICAN AMERICAN: 55
GLOBULIN: 3.5 G/DL
GLUCOSE BLD-MCNC: 90 MG/DL (ref 70–99)
GLUCOSE URINE: NEGATIVE MG/DL
HCT VFR BLD CALC: 35.8 % (ref 36–48)
HDLC SERPL-MCNC: 49 MG/DL (ref 40–60)
HEMOGLOBIN: 11.8 G/DL (ref 12–16)
KETONES, URINE: NEGATIVE MG/DL
LDL CHOLESTEROL CALCULATED: 63 MG/DL
LEUKOCYTE ESTERASE, URINE: NEGATIVE
LYMPHOCYTES ABSOLUTE: 3.4 K/UL (ref 1–5.1)
LYMPHOCYTES RELATIVE PERCENT: 52.6 %
MCH RBC QN AUTO: 30.1 PG (ref 26–34)
MCHC RBC AUTO-ENTMCNC: 33.1 G/DL (ref 31–36)
MCV RBC AUTO: 91.2 FL (ref 80–100)
MICROSCOPIC EXAMINATION: NORMAL
MONOCYTES ABSOLUTE: 0.6 K/UL (ref 0–1.3)
MONOCYTES RELATIVE PERCENT: 9.3 %
NEUTROPHILS ABSOLUTE: 2.4 K/UL (ref 1.7–7.7)
NEUTROPHILS RELATIVE PERCENT: 36.8 %
NITRITE, URINE: NEGATIVE
PDW BLD-RTO: 14.6 % (ref 12.4–15.4)
PH UA: 6
PLATELET # BLD: 256 K/UL (ref 135–450)
PMV BLD AUTO: 7.8 FL (ref 5–10.5)
POTASSIUM SERPL-SCNC: 4.9 MMOL/L (ref 3.5–5.1)
PROTEIN UA: NEGATIVE MG/DL
RBC # BLD: 3.92 M/UL (ref 4–5.2)
SODIUM BLD-SCNC: 141 MMOL/L (ref 136–145)
SPECIFIC GRAVITY UA: 1.01
TOTAL PROTEIN: 8 G/DL (ref 6.4–8.2)
TRIGL SERPL-MCNC: 74 MG/DL (ref 0–150)
TSH SERPL DL<=0.05 MIU/L-ACNC: 1.36 UIU/ML (ref 0.27–4.2)
URINE TYPE: NORMAL
UROBILINOGEN, URINE: 0.2 E.U./DL
VITAMIN D 25-HYDROXY: 33.3 NG/ML
VLDLC SERPL CALC-MCNC: 15 MG/DL
WBC # BLD: 6.4 K/UL (ref 4–11)

## 2019-01-28 PROCEDURE — G8482 FLU IMMUNIZE ORDER/ADMIN: HCPCS | Performed by: INTERNAL MEDICINE

## 2019-01-28 PROCEDURE — G8599 NO ASA/ANTIPLAT THER USE RNG: HCPCS | Performed by: INTERNAL MEDICINE

## 2019-01-28 PROCEDURE — G0439 PPPS, SUBSEQ VISIT: HCPCS | Performed by: INTERNAL MEDICINE

## 2019-01-28 PROCEDURE — 93000 ELECTROCARDIOGRAM COMPLETE: CPT | Performed by: INTERNAL MEDICINE

## 2019-01-28 PROCEDURE — 4040F PNEUMOC VAC/ADMIN/RCVD: CPT | Performed by: INTERNAL MEDICINE

## 2019-01-28 ASSESSMENT — PATIENT HEALTH QUESTIONNAIRE - PHQ9
SUM OF ALL RESPONSES TO PHQ QUESTIONS 1-9: 1
SUM OF ALL RESPONSES TO PHQ QUESTIONS 1-9: 1

## 2019-01-28 ASSESSMENT — LIFESTYLE VARIABLES: HOW OFTEN DO YOU HAVE A DRINK CONTAINING ALCOHOL: 0

## 2019-01-28 ASSESSMENT — ANXIETY QUESTIONNAIRES: GAD7 TOTAL SCORE: 0

## 2019-02-07 DIAGNOSIS — I10 ESSENTIAL HYPERTENSION: ICD-10-CM

## 2019-02-07 RX ORDER — LISINOPRIL AND HYDROCHLOROTHIAZIDE 25; 20 MG/1; MG/1
TABLET ORAL
Qty: 90 TABLET | Refills: 0 | Status: SHIPPED | OUTPATIENT
Start: 2019-02-07 | End: 2019-05-09 | Stop reason: SDUPTHER

## 2019-05-09 DIAGNOSIS — I10 ESSENTIAL HYPERTENSION: ICD-10-CM

## 2019-05-09 RX ORDER — LISINOPRIL AND HYDROCHLOROTHIAZIDE 25; 20 MG/1; MG/1
TABLET ORAL
Qty: 90 TABLET | Refills: 0 | Status: SHIPPED | OUTPATIENT
Start: 2019-05-09 | End: 2019-08-09 | Stop reason: SDUPTHER

## 2019-06-27 RX ORDER — ATORVASTATIN CALCIUM 40 MG/1
40 TABLET, FILM COATED ORAL NIGHTLY
Qty: 90 TABLET | Refills: 1 | Status: SHIPPED | OUTPATIENT
Start: 2019-06-27 | End: 2019-12-23

## 2019-07-24 RX ORDER — AMLODIPINE BESYLATE 5 MG/1
TABLET ORAL
Qty: 90 TABLET | Refills: 3 | Status: SHIPPED | OUTPATIENT
Start: 2019-07-24 | End: 2020-07-30

## 2019-08-09 DIAGNOSIS — I10 ESSENTIAL HYPERTENSION: ICD-10-CM

## 2019-08-09 RX ORDER — LISINOPRIL AND HYDROCHLOROTHIAZIDE 25; 20 MG/1; MG/1
TABLET ORAL
Qty: 90 TABLET | Refills: 1 | Status: SHIPPED | OUTPATIENT
Start: 2019-08-09 | End: 2020-02-07

## 2019-10-07 ENCOUNTER — TELEPHONE (OUTPATIENT)
Dept: INTERNAL MEDICINE CLINIC | Age: 69
End: 2019-10-07

## 2019-10-24 ENCOUNTER — NURSE ONLY (OUTPATIENT)
Dept: INTERNAL MEDICINE CLINIC | Age: 69
End: 2019-10-24
Payer: MEDICARE

## 2019-10-24 DIAGNOSIS — Z23 NEED FOR INFLUENZA VACCINATION: Primary | ICD-10-CM

## 2019-10-24 PROCEDURE — 90653 IIV ADJUVANT VACCINE IM: CPT | Performed by: INTERNAL MEDICINE

## 2019-10-24 PROCEDURE — G0008 ADMIN INFLUENZA VIRUS VAC: HCPCS | Performed by: INTERNAL MEDICINE

## 2019-12-23 RX ORDER — ATORVASTATIN CALCIUM 40 MG/1
40 TABLET, FILM COATED ORAL NIGHTLY
Qty: 90 TABLET | Refills: 0 | Status: SHIPPED | OUTPATIENT
Start: 2019-12-23 | End: 2019-12-24

## 2019-12-24 RX ORDER — ATORVASTATIN CALCIUM 40 MG/1
40 TABLET, FILM COATED ORAL NIGHTLY
Qty: 90 TABLET | Refills: 0 | Status: SHIPPED | OUTPATIENT
Start: 2019-12-24 | End: 2020-03-23

## 2019-12-30 ENCOUNTER — TELEPHONE (OUTPATIENT)
Dept: INTERNAL MEDICINE CLINIC | Age: 69
End: 2019-12-30

## 2019-12-30 RX ORDER — DICLOFENAC SODIUM 75 MG/1
75 TABLET, DELAYED RELEASE ORAL 2 TIMES DAILY
Qty: 20 TABLET | Refills: 0 | Status: SHIPPED | OUTPATIENT
Start: 2019-12-30 | End: 2020-03-30 | Stop reason: CLARIF

## 2020-02-07 RX ORDER — LISINOPRIL AND HYDROCHLOROTHIAZIDE 25; 20 MG/1; MG/1
TABLET ORAL
Qty: 30 TABLET | Refills: 0 | Status: SHIPPED | OUTPATIENT
Start: 2020-02-07 | End: 2020-02-11

## 2020-02-11 RX ORDER — LISINOPRIL AND HYDROCHLOROTHIAZIDE 25; 20 MG/1; MG/1
TABLET ORAL
Qty: 90 TABLET | Refills: 0 | Status: SHIPPED | OUTPATIENT
Start: 2020-02-11 | End: 2020-03-09

## 2020-03-09 RX ORDER — LISINOPRIL AND HYDROCHLOROTHIAZIDE 25; 20 MG/1; MG/1
TABLET ORAL
Qty: 30 TABLET | Refills: 1 | Status: ON HOLD
Start: 2020-03-09 | End: 2020-04-07 | Stop reason: HOSPADM

## 2020-03-23 RX ORDER — ATORVASTATIN CALCIUM 40 MG/1
40 TABLET, FILM COATED ORAL NIGHTLY
Qty: 90 TABLET | Refills: 0 | Status: SHIPPED | OUTPATIENT
Start: 2020-03-23 | End: 2020-05-04

## 2020-03-30 ENCOUNTER — HOSPITAL ENCOUNTER (INPATIENT)
Age: 70
LOS: 8 days | Discharge: HOME | DRG: 841 | End: 2020-04-07
Attending: EMERGENCY MEDICINE | Admitting: INTERNAL MEDICINE
Payer: MEDICARE

## 2020-03-30 ENCOUNTER — OFFICE VISIT (OUTPATIENT)
Dept: INTERNAL MEDICINE CLINIC | Age: 70
End: 2020-03-30
Payer: MEDICARE

## 2020-03-30 ENCOUNTER — APPOINTMENT (OUTPATIENT)
Dept: CT IMAGING | Age: 70
DRG: 841 | End: 2020-03-30
Payer: MEDICARE

## 2020-03-30 VITALS
TEMPERATURE: 98.7 F | WEIGHT: 127 LBS | HEIGHT: 64 IN | DIASTOLIC BLOOD PRESSURE: 66 MMHG | BODY MASS INDEX: 21.68 KG/M2 | SYSTOLIC BLOOD PRESSURE: 130 MMHG

## 2020-03-30 DIAGNOSIS — R10.11 RUQ PAIN: ICD-10-CM

## 2020-03-30 PROBLEM — N17.9 ACUTE KIDNEY INJURY (HCC): Status: ACTIVE | Noted: 2020-03-30

## 2020-03-30 LAB
A/G RATIO: 0.9 (ref 1.1–2.2)
ALBUMIN SERPL-MCNC: 4.1 G/DL (ref 3.4–5)
ALBUMIN SERPL-MCNC: 4.3 G/DL (ref 3.4–5)
ALP BLD-CCNC: 90 U/L (ref 40–129)
ALP BLD-CCNC: 96 U/L (ref 40–129)
ALT SERPL-CCNC: 8 U/L (ref 10–40)
ALT SERPL-CCNC: 9 U/L (ref 10–40)
ANION GAP SERPL CALCULATED.3IONS-SCNC: 23 MMOL/L (ref 3–16)
ANION GAP SERPL CALCULATED.3IONS-SCNC: 25 MMOL/L (ref 3–16)
AST SERPL-CCNC: 15 U/L (ref 15–37)
AST SERPL-CCNC: 16 U/L (ref 15–37)
BACTERIA: ABNORMAL /HPF
BASOPHILS ABSOLUTE: 0.1 K/UL (ref 0–0.2)
BASOPHILS ABSOLUTE: 0.1 K/UL (ref 0–0.2)
BASOPHILS RELATIVE PERCENT: 0.4 %
BASOPHILS RELATIVE PERCENT: 0.9 %
BILIRUB SERPL-MCNC: 0.3 MG/DL (ref 0–1)
BILIRUB SERPL-MCNC: 0.3 MG/DL (ref 0–1)
BILIRUBIN DIRECT: <0.2 MG/DL (ref 0–0.3)
BILIRUBIN URINE: NEGATIVE
BILIRUBIN, INDIRECT: ABNORMAL MG/DL (ref 0–1)
BILIRUBIN, POC: NEGATIVE
BLOOD URINE, POC: ABNORMAL
BLOOD, URINE: ABNORMAL
BUN BLDV-MCNC: 100 MG/DL (ref 7–20)
BUN BLDV-MCNC: 111 MG/DL (ref 7–20)
CALCIUM SERPL-MCNC: 14 MG/DL (ref 8.3–10.6)
CALCIUM SERPL-MCNC: 14.5 MG/DL (ref 8.3–10.6)
CHLORIDE BLD-SCNC: 90 MMOL/L (ref 99–110)
CHLORIDE BLD-SCNC: 92 MMOL/L (ref 99–110)
CLARITY, POC: CLEAR
CLARITY: CLEAR
CO2: 22 MMOL/L (ref 21–32)
CO2: 23 MMOL/L (ref 21–32)
COLOR, POC: YELLOW
COLOR: YELLOW
CREAT SERPL-MCNC: 10.8 MG/DL (ref 0.6–1.2)
CREAT SERPL-MCNC: 9.2 MG/DL (ref 0.6–1.2)
EOSINOPHILS ABSOLUTE: 0.1 K/UL (ref 0–0.6)
EOSINOPHILS ABSOLUTE: 0.1 K/UL (ref 0–0.6)
EOSINOPHILS RELATIVE PERCENT: 0.6 %
EOSINOPHILS RELATIVE PERCENT: 0.8 %
EPITHELIAL CELLS, UA: ABNORMAL /HPF (ref 0–5)
GFR AFRICAN AMERICAN: 4
GFR AFRICAN AMERICAN: 5
GFR NON-AFRICAN AMERICAN: 3
GFR NON-AFRICAN AMERICAN: 4
GLOBULIN: 4.9 G/DL
GLUCOSE BLD-MCNC: 76 MG/DL (ref 70–99)
GLUCOSE BLD-MCNC: 99 MG/DL (ref 70–99)
GLUCOSE URINE, POC: NEGATIVE
GLUCOSE URINE: NEGATIVE MG/DL
HCT VFR BLD CALC: 30.1 % (ref 36–48)
HCT VFR BLD CALC: 30.9 % (ref 36–48)
HEMOGLOBIN: 10.1 G/DL (ref 12–16)
HEMOGLOBIN: 9.8 G/DL (ref 12–16)
KETONES, POC: NEGATIVE
KETONES, URINE: NEGATIVE MG/DL
LEUKOCYTE EST, POC: ABNORMAL
LEUKOCYTE ESTERASE, URINE: ABNORMAL
LIPASE: 11 U/L (ref 13–60)
LIPASE: 13 U/L (ref 13–60)
LYMPHOCYTES ABSOLUTE: 3.8 K/UL (ref 1–5.1)
LYMPHOCYTES ABSOLUTE: 4 K/UL (ref 1–5.1)
LYMPHOCYTES RELATIVE PERCENT: 25 %
LYMPHOCYTES RELATIVE PERCENT: 27 %
MCH RBC QN AUTO: 29.3 PG (ref 26–34)
MCH RBC QN AUTO: 29.5 PG (ref 26–34)
MCHC RBC AUTO-ENTMCNC: 32.6 G/DL (ref 31–36)
MCHC RBC AUTO-ENTMCNC: 32.7 G/DL (ref 31–36)
MCV RBC AUTO: 89.9 FL (ref 80–100)
MCV RBC AUTO: 90.3 FL (ref 80–100)
MICROSCOPIC EXAMINATION: YES
MONOCYTES ABSOLUTE: 1.2 K/UL (ref 0–1.3)
MONOCYTES ABSOLUTE: 1.4 K/UL (ref 0–1.3)
MONOCYTES RELATIVE PERCENT: 8 %
MONOCYTES RELATIVE PERCENT: 9.3 %
NEUTROPHILS ABSOLUTE: 9.5 K/UL (ref 1.7–7.7)
NEUTROPHILS ABSOLUTE: 9.6 K/UL (ref 1.7–7.7)
NEUTROPHILS RELATIVE PERCENT: 64 %
NEUTROPHILS RELATIVE PERCENT: 64 %
NITRITE, POC: NEGATIVE
NITRITE, URINE: NEGATIVE
PDW BLD-RTO: 14.2 % (ref 12.4–15.4)
PDW BLD-RTO: 14.6 % (ref 12.4–15.4)
PH UA: 6 (ref 5–8)
PH, POC: 5
PHOSPHORUS: 8.4 MG/DL (ref 2.5–4.9)
PLATELET # BLD: 245 K/UL (ref 135–450)
PLATELET # BLD: 254 K/UL (ref 135–450)
PMV BLD AUTO: 8.3 FL (ref 5–10.5)
PMV BLD AUTO: 8.7 FL (ref 5–10.5)
POTASSIUM SERPL-SCNC: 4.2 MMOL/L (ref 3.5–5.1)
POTASSIUM SERPL-SCNC: 5 MMOL/L (ref 3.5–5.1)
PROTEIN UA: 30 MG/DL
PROTEIN, POC: 300
RBC # BLD: 3.33 M/UL (ref 4–5.2)
RBC # BLD: 3.43 M/UL (ref 4–5.2)
RBC UA: ABNORMAL /HPF (ref 0–4)
SODIUM BLD-SCNC: 136 MMOL/L (ref 136–145)
SODIUM BLD-SCNC: 139 MMOL/L (ref 136–145)
SODIUM URINE: 53 MMOL/L
SPECIFIC GRAVITY UA: 1.02 (ref 1–1.03)
SPECIFIC GRAVITY, POC: 1.01
TOTAL PROTEIN: 9.2 G/DL (ref 6.4–8.2)
TOTAL PROTEIN: 9.3 G/DL (ref 6.4–8.2)
TSH SERPL DL<=0.05 MIU/L-ACNC: 0.93 UIU/ML (ref 0.27–4.2)
URINE TYPE: ABNORMAL
UROBILINOGEN, POC: 0.2
UROBILINOGEN, URINE: 0.2 E.U./DL
WBC # BLD: 14.8 K/UL (ref 4–11)
WBC # BLD: 15.1 K/UL (ref 4–11)
WBC UA: ABNORMAL /HPF (ref 0–5)

## 2020-03-30 PROCEDURE — 6360000002 HC RX W HCPCS: Performed by: EMERGENCY MEDICINE

## 2020-03-30 PROCEDURE — 74176 CT ABD & PELVIS W/O CONTRAST: CPT

## 2020-03-30 PROCEDURE — 1090F PRES/ABSN URINE INCON ASSESS: CPT | Performed by: INTERNAL MEDICINE

## 2020-03-30 PROCEDURE — 82570 ASSAY OF URINE CREATININE: CPT

## 2020-03-30 PROCEDURE — 3017F COLORECTAL CA SCREEN DOC REV: CPT | Performed by: INTERNAL MEDICINE

## 2020-03-30 PROCEDURE — 83690 ASSAY OF LIPASE: CPT

## 2020-03-30 PROCEDURE — G8482 FLU IMMUNIZE ORDER/ADMIN: HCPCS | Performed by: INTERNAL MEDICINE

## 2020-03-30 PROCEDURE — 4040F PNEUMOC VAC/ADMIN/RCVD: CPT | Performed by: INTERNAL MEDICINE

## 2020-03-30 PROCEDURE — 1123F ACP DISCUSS/DSCN MKR DOCD: CPT | Performed by: INTERNAL MEDICINE

## 2020-03-30 PROCEDURE — 81002 URINALYSIS NONAUTO W/O SCOPE: CPT | Performed by: INTERNAL MEDICINE

## 2020-03-30 PROCEDURE — G8427 DOCREV CUR MEDS BY ELIG CLIN: HCPCS | Performed by: INTERNAL MEDICINE

## 2020-03-30 PROCEDURE — 80048 BASIC METABOLIC PNL TOTAL CA: CPT

## 2020-03-30 PROCEDURE — G8399 PT W/DXA RESULTS DOCUMENT: HCPCS | Performed by: INTERNAL MEDICINE

## 2020-03-30 PROCEDURE — 1200000000 HC SEMI PRIVATE

## 2020-03-30 PROCEDURE — 96374 THER/PROPH/DIAG INJ IV PUSH: CPT

## 2020-03-30 PROCEDURE — 36415 COLL VENOUS BLD VENIPUNCTURE: CPT

## 2020-03-30 PROCEDURE — 80076 HEPATIC FUNCTION PANEL: CPT

## 2020-03-30 PROCEDURE — G8420 CALC BMI NORM PARAMETERS: HCPCS | Performed by: INTERNAL MEDICINE

## 2020-03-30 PROCEDURE — 81001 URINALYSIS AUTO W/SCOPE: CPT

## 2020-03-30 PROCEDURE — 84540 ASSAY OF URINE/UREA-N: CPT

## 2020-03-30 PROCEDURE — 4004F PT TOBACCO SCREEN RCVD TLK: CPT | Performed by: INTERNAL MEDICINE

## 2020-03-30 PROCEDURE — 99284 EMERGENCY DEPT VISIT MOD MDM: CPT

## 2020-03-30 PROCEDURE — 85025 COMPLETE CBC W/AUTO DIFF WBC: CPT

## 2020-03-30 PROCEDURE — 99213 OFFICE O/P EST LOW 20 MIN: CPT | Performed by: INTERNAL MEDICINE

## 2020-03-30 PROCEDURE — 2580000003 HC RX 258: Performed by: EMERGENCY MEDICINE

## 2020-03-30 PROCEDURE — 84100 ASSAY OF PHOSPHORUS: CPT

## 2020-03-30 PROCEDURE — 93005 ELECTROCARDIOGRAM TRACING: CPT | Performed by: EMERGENCY MEDICINE

## 2020-03-30 PROCEDURE — 84300 ASSAY OF URINE SODIUM: CPT

## 2020-03-30 RX ORDER — ONDANSETRON 2 MG/ML
4 INJECTION INTRAMUSCULAR; INTRAVENOUS ONCE
Status: COMPLETED | OUTPATIENT
Start: 2020-03-30 | End: 2020-03-30

## 2020-03-30 RX ORDER — ACETAMINOPHEN 325 MG/1
650 TABLET ORAL EVERY 6 HOURS PRN
COMMUNITY

## 2020-03-30 RX ORDER — 0.9 % SODIUM CHLORIDE 0.9 %
1000 INTRAVENOUS SOLUTION INTRAVENOUS ONCE
Status: COMPLETED | OUTPATIENT
Start: 2020-03-30 | End: 2020-03-31

## 2020-03-30 RX ORDER — SODIUM CHLORIDE 9 MG/ML
INJECTION, SOLUTION INTRAVENOUS CONTINUOUS
Status: DISCONTINUED | OUTPATIENT
Start: 2020-03-31 | End: 2020-04-01

## 2020-03-30 RX ADMIN — SODIUM CHLORIDE 1000 ML: 9 INJECTION, SOLUTION INTRAVENOUS at 22:02

## 2020-03-30 RX ADMIN — ONDANSETRON 4 MG: 2 INJECTION INTRAMUSCULAR; INTRAVENOUS at 22:10

## 2020-03-31 ENCOUNTER — APPOINTMENT (OUTPATIENT)
Dept: GENERAL RADIOLOGY | Age: 70
DRG: 841 | End: 2020-03-31
Payer: MEDICARE

## 2020-03-31 LAB
ALBUMIN SERPL-MCNC: 3.4 G/DL (ref 3.4–5)
ANION GAP SERPL CALCULATED.3IONS-SCNC: 21 MMOL/L (ref 3–16)
BASOPHILS ABSOLUTE: 0 K/UL (ref 0–0.2)
BASOPHILS ABSOLUTE: 0.1 K/UL (ref 0–0.2)
BASOPHILS RELATIVE PERCENT: 0.3 %
BASOPHILS RELATIVE PERCENT: 0.4 %
BUN BLDV-MCNC: 112 MG/DL (ref 7–20)
CALCIUM SERPL-MCNC: 12.5 MG/DL (ref 8.3–10.6)
CHLORIDE BLD-SCNC: 97 MMOL/L (ref 99–110)
CO2: 19 MMOL/L (ref 21–32)
CREAT SERPL-MCNC: 10.7 MG/DL (ref 0.6–1.2)
CREATININE URINE: 124 MG/DL (ref 28–259)
EKG ATRIAL RATE: 75 BPM
EKG DIAGNOSIS: NORMAL
EKG P AXIS: 84 DEGREES
EKG P-R INTERVAL: 210 MS
EKG Q-T INTERVAL: 384 MS
EKG QRS DURATION: 100 MS
EKG QTC CALCULATION (BAZETT): 428 MS
EKG R AXIS: 35 DEGREES
EKG T AXIS: 61 DEGREES
EKG VENTRICULAR RATE: 75 BPM
EOSINOPHILS ABSOLUTE: 0.1 K/UL (ref 0–0.6)
EOSINOPHILS ABSOLUTE: 0.2 K/UL (ref 0–0.6)
EOSINOPHILS RELATIVE PERCENT: 1 %
EOSINOPHILS RELATIVE PERCENT: 1.2 %
GFR AFRICAN AMERICAN: 4
GFR NON-AFRICAN AMERICAN: 4
GLUCOSE BLD-MCNC: 82 MG/DL (ref 70–99)
HCT VFR BLD CALC: 27.1 % (ref 36–48)
HCT VFR BLD CALC: 29.9 % (ref 36–48)
HEMOGLOBIN: 8.9 G/DL (ref 12–16)
HEMOGLOBIN: 9.4 G/DL (ref 12–16)
LACTATE DEHYDROGENASE: 165 U/L (ref 100–190)
LYMPHOCYTES ABSOLUTE: 2.8 K/UL (ref 1–5.1)
LYMPHOCYTES ABSOLUTE: 3.7 K/UL (ref 1–5.1)
LYMPHOCYTES RELATIVE PERCENT: 19.6 %
LYMPHOCYTES RELATIVE PERCENT: 26.7 %
MCH RBC QN AUTO: 29.4 PG (ref 26–34)
MCH RBC QN AUTO: 29.6 PG (ref 26–34)
MCHC RBC AUTO-ENTMCNC: 31.4 G/DL (ref 31–36)
MCHC RBC AUTO-ENTMCNC: 32.8 G/DL (ref 31–36)
MCV RBC AUTO: 89.9 FL (ref 80–100)
MCV RBC AUTO: 94.1 FL (ref 80–100)
MONOCYTES ABSOLUTE: 1.3 K/UL (ref 0–1.3)
MONOCYTES ABSOLUTE: 1.5 K/UL (ref 0–1.3)
MONOCYTES RELATIVE PERCENT: 11.1 %
MONOCYTES RELATIVE PERCENT: 8.9 %
NEUTROPHILS ABSOLUTE: 10 K/UL (ref 1.7–7.7)
NEUTROPHILS ABSOLUTE: 8.4 K/UL (ref 1.7–7.7)
NEUTROPHILS RELATIVE PERCENT: 60.6 %
NEUTROPHILS RELATIVE PERCENT: 70.2 %
PARATHYROID HORMONE INTACT: 9.7 PG/ML (ref 14–72)
PDW BLD-RTO: 14.8 % (ref 12.4–15.4)
PDW BLD-RTO: 14.9 % (ref 12.4–15.4)
PHOSPHORUS: 7.1 MG/DL (ref 2.5–4.9)
PLATELET # BLD: 224 K/UL (ref 135–450)
PLATELET # BLD: 247 K/UL (ref 135–450)
PMV BLD AUTO: 7.9 FL (ref 5–10.5)
PMV BLD AUTO: 8.1 FL (ref 5–10.5)
POTASSIUM SERPL-SCNC: 4.4 MMOL/L (ref 3.5–5.1)
RBC # BLD: 3.02 M/UL (ref 4–5.2)
RBC # BLD: 3.18 M/UL (ref 4–5.2)
SODIUM BLD-SCNC: 137 MMOL/L (ref 136–145)
UREA NITROGEN, UR: 383.2 MG/DL (ref 800–1666)
URINE CULTURE, ROUTINE: NORMAL
VITAMIN D 25-HYDROXY: 26 NG/ML
WBC # BLD: 13.8 K/UL (ref 4–11)
WBC # BLD: 14.3 K/UL (ref 4–11)

## 2020-03-31 PROCEDURE — 1200000000 HC SEMI PRIVATE

## 2020-03-31 PROCEDURE — 83615 LACTATE (LD) (LDH) ENZYME: CPT

## 2020-03-31 PROCEDURE — 84166 PROTEIN E-PHORESIS/URINE/CSF: CPT

## 2020-03-31 PROCEDURE — 84156 ASSAY OF PROTEIN URINE: CPT

## 2020-03-31 PROCEDURE — 83883 ASSAY NEPHELOMETRY NOT SPEC: CPT

## 2020-03-31 PROCEDURE — 85025 COMPLETE CBC W/AUTO DIFF WBC: CPT

## 2020-03-31 PROCEDURE — 2580000003 HC RX 258: Performed by: STUDENT IN AN ORGANIZED HEALTH CARE EDUCATION/TRAINING PROGRAM

## 2020-03-31 PROCEDURE — 88305 TISSUE EXAM BY PATHOLOGIST: CPT

## 2020-03-31 PROCEDURE — 82232 ASSAY OF BETA-2 PROTEIN: CPT

## 2020-03-31 PROCEDURE — 88311 DECALCIFY TISSUE: CPT

## 2020-03-31 PROCEDURE — 80069 RENAL FUNCTION PANEL: CPT

## 2020-03-31 PROCEDURE — 6360000002 HC RX W HCPCS: Performed by: STUDENT IN AN ORGANIZED HEALTH CARE EDUCATION/TRAINING PROGRAM

## 2020-03-31 PROCEDURE — 84165 PROTEIN E-PHORESIS SERUM: CPT

## 2020-03-31 PROCEDURE — 77075 RADEX OSSEOUS SURVEY COMPL: CPT

## 2020-03-31 PROCEDURE — 36415 COLL VENOUS BLD VENIPUNCTURE: CPT

## 2020-03-31 PROCEDURE — 99222 1ST HOSP IP/OBS MODERATE 55: CPT | Performed by: INTERNAL MEDICINE

## 2020-03-31 PROCEDURE — 82306 VITAMIN D 25 HYDROXY: CPT

## 2020-03-31 PROCEDURE — 83970 ASSAY OF PARATHORMONE: CPT

## 2020-03-31 PROCEDURE — 88313 SPECIAL STAINS GROUP 2: CPT

## 2020-03-31 PROCEDURE — 88342 IMHCHEM/IMCYTCHM 1ST ANTB: CPT

## 2020-03-31 PROCEDURE — 84155 ASSAY OF PROTEIN SERUM: CPT

## 2020-03-31 PROCEDURE — 6370000000 HC RX 637 (ALT 250 FOR IP): Performed by: STUDENT IN AN ORGANIZED HEALTH CARE EDUCATION/TRAINING PROGRAM

## 2020-03-31 PROCEDURE — 07DR3ZX EXTRACTION OF ILIAC BONE MARROW, PERCUTANEOUS APPROACH, DIAGNOSTIC: ICD-10-PCS | Performed by: INTERNAL MEDICINE

## 2020-03-31 RX ORDER — CALCITONIN SALMON 200 [USP'U]/ML
200 INJECTION, SOLUTION INTRAMUSCULAR; SUBCUTANEOUS ONCE
Status: COMPLETED | OUTPATIENT
Start: 2020-03-31 | End: 2020-03-31

## 2020-03-31 RX ORDER — ATORVASTATIN CALCIUM 40 MG/1
40 TABLET, FILM COATED ORAL NIGHTLY
Status: DISCONTINUED | OUTPATIENT
Start: 2020-03-31 | End: 2020-04-07 | Stop reason: HOSPADM

## 2020-03-31 RX ORDER — ACETAMINOPHEN 325 MG/1
650 TABLET ORAL EVERY 6 HOURS PRN
Status: DISCONTINUED | OUTPATIENT
Start: 2020-03-31 | End: 2020-04-07 | Stop reason: HOSPADM

## 2020-03-31 RX ORDER — ONDANSETRON 2 MG/ML
4 INJECTION INTRAMUSCULAR; INTRAVENOUS EVERY 6 HOURS PRN
Status: DISCONTINUED | OUTPATIENT
Start: 2020-03-31 | End: 2020-04-07 | Stop reason: HOSPADM

## 2020-03-31 RX ORDER — POLYETHYLENE GLYCOL 3350 17 G/17G
17 POWDER, FOR SOLUTION ORAL DAILY PRN
Status: DISCONTINUED | OUTPATIENT
Start: 2020-03-31 | End: 2020-04-07 | Stop reason: HOSPADM

## 2020-03-31 RX ORDER — PROMETHAZINE HYDROCHLORIDE 12.5 MG/1
12.5 TABLET ORAL EVERY 6 HOURS PRN
Status: DISCONTINUED | OUTPATIENT
Start: 2020-03-31 | End: 2020-04-04

## 2020-03-31 RX ORDER — UREA 10 %
3 LOTION (ML) TOPICAL ONCE
Status: DISCONTINUED | OUTPATIENT
Start: 2020-03-31 | End: 2020-03-31

## 2020-03-31 RX ORDER — AMLODIPINE BESYLATE 5 MG/1
5 TABLET ORAL DAILY
Status: DISCONTINUED | OUTPATIENT
Start: 2020-03-31 | End: 2020-04-07 | Stop reason: HOSPADM

## 2020-03-31 RX ORDER — SODIUM CHLORIDE 0.9 % (FLUSH) 0.9 %
10 SYRINGE (ML) INJECTION EVERY 12 HOURS SCHEDULED
Status: DISCONTINUED | OUTPATIENT
Start: 2020-03-31 | End: 2020-04-07 | Stop reason: HOSPADM

## 2020-03-31 RX ORDER — SODIUM CHLORIDE 0.9 % (FLUSH) 0.9 %
10 SYRINGE (ML) INJECTION PRN
Status: DISCONTINUED | OUTPATIENT
Start: 2020-03-31 | End: 2020-04-07 | Stop reason: HOSPADM

## 2020-03-31 RX ORDER — ACETAMINOPHEN 650 MG/1
650 SUPPOSITORY RECTAL EVERY 6 HOURS PRN
Status: DISCONTINUED | OUTPATIENT
Start: 2020-03-31 | End: 2020-04-07 | Stop reason: HOSPADM

## 2020-03-31 RX ORDER — HEPARIN SODIUM 5000 [USP'U]/ML
5000 INJECTION, SOLUTION INTRAVENOUS; SUBCUTANEOUS EVERY 8 HOURS SCHEDULED
Status: DISCONTINUED | OUTPATIENT
Start: 2020-03-31 | End: 2020-04-07 | Stop reason: HOSPADM

## 2020-03-31 RX ADMIN — AMLODIPINE BESYLATE 5 MG: 5 TABLET ORAL at 09:36

## 2020-03-31 RX ADMIN — SODIUM CHLORIDE: 9 INJECTION, SOLUTION INTRAVENOUS at 09:36

## 2020-03-31 RX ADMIN — ACETAMINOPHEN 650 MG: 325 TABLET ORAL at 23:35

## 2020-03-31 RX ADMIN — SODIUM CHLORIDE: 9 INJECTION, SOLUTION INTRAVENOUS at 00:41

## 2020-03-31 RX ADMIN — SODIUM CHLORIDE: 9 INJECTION, SOLUTION INTRAVENOUS at 15:28

## 2020-03-31 RX ADMIN — METOPROLOL TARTRATE 25 MG: 25 TABLET, FILM COATED ORAL at 09:36

## 2020-03-31 RX ADMIN — CALCITONIN SALMON 200 UNITS: 200 INJECTION, SOLUTION INTRAMUSCULAR; SUBCUTANEOUS at 03:58

## 2020-03-31 RX ADMIN — ATORVASTATIN CALCIUM 40 MG: 40 TABLET, FILM COATED ORAL at 19:53

## 2020-03-31 RX ADMIN — HEPARIN SODIUM 5000 UNITS: 5000 INJECTION INTRAVENOUS; SUBCUTANEOUS at 06:11

## 2020-03-31 RX ADMIN — SODIUM CHLORIDE: 9 INJECTION, SOLUTION INTRAVENOUS at 19:55

## 2020-03-31 RX ADMIN — METOPROLOL TARTRATE 25 MG: 25 TABLET, FILM COATED ORAL at 19:53

## 2020-03-31 RX ADMIN — PAMIDRONATE DISODIUM 60 MG: 3 INJECTION, SOLUTION INTRAVENOUS at 02:39

## 2020-03-31 ASSESSMENT — PAIN DESCRIPTION - PAIN TYPE: TYPE: ACUTE PAIN

## 2020-03-31 ASSESSMENT — ENCOUNTER SYMPTOMS
SHORTNESS OF BREATH: 0
BACK PAIN: 0
VOMITING: 1
NAUSEA: 1
BLOOD IN STOOL: 0
COUGH: 0
ABDOMINAL PAIN: 0

## 2020-03-31 ASSESSMENT — PAIN DESCRIPTION - LOCATION: LOCATION: EAR

## 2020-03-31 ASSESSMENT — PAIN DESCRIPTION - DESCRIPTORS: DESCRIPTORS: ACHING

## 2020-03-31 ASSESSMENT — PAIN SCALES - GENERAL
PAINLEVEL_OUTOF10: 0
PAINLEVEL_OUTOF10: 10
PAINLEVEL_OUTOF10: 0

## 2020-03-31 ASSESSMENT — PAIN DESCRIPTION - FREQUENCY: FREQUENCY: CONTINUOUS

## 2020-03-31 ASSESSMENT — PAIN DESCRIPTION - PROGRESSION: CLINICAL_PROGRESSION: NOT CHANGED

## 2020-03-31 ASSESSMENT — PAIN DESCRIPTION - ONSET: ONSET: GRADUAL

## 2020-03-31 ASSESSMENT — PAIN - FUNCTIONAL ASSESSMENT: PAIN_FUNCTIONAL_ASSESSMENT: ACTIVITIES ARE NOT PREVENTED

## 2020-03-31 ASSESSMENT — PAIN DESCRIPTION - ORIENTATION: ORIENTATION: RIGHT

## 2020-03-31 NOTE — CARE COORDINATION
Case Management Assessment           Daily Note                 Date/ Time of Note: 3/31/2020 4:45 PM         Note completed by: Isadora Atkinson    Patient Name: Arthur Torres  YOB: 1950    Diagnosis:Acute kidney injury [facility]) [N17.9]  Acute kidney injury [facility]) [N17.9]  Patient Admission Status: Inpatient    Date of Admission:3/30/2020  8:58 PM Length of Stay: 1 GLOS:        Current Plan of Care: admitted to the hospital with acute kidney injury and hypercalcemia  Acute kidney injury    Euvolemic on exam, CT abdomen pelvis completed to rule out obstruction, noted new lytic bone lesions. Discussed with Dr. Kady Vincent from nephrology    urine studies ordered   - strict ins and outs  - normal saline @ 150 cc/hr   - hold lisinopril-HCTZ   - suspect nausea and vomiting from uremia rather than GI pathology     - consult hematology, consider repeat BM biopsy, lab work is currently pending. Concern for MM given positive CRAB criteria and elevated total protein   ________________________________________________________________________________________  PT AM-PAC:   / 24 per last evaluation on: NA     OT AM-PAC:   / 24 per last evaluation on: NA     DME Needs for discharge: NA    ________________________________________________________________________________________  Discharge Plan:  Home      Tentative discharge date:  2-3 days     Current barriers to discharge: BM  Bx pending     Referrals completed: Not Applicable    Resources/ information provided: Not indicated at this time  ________________________________________________________________________________________  Case Management Notes:  CM following for  D/c planning : Pt plans to return home w/ family  At this time. Consults  Noted:  BM  Bx pending,  Cont to assist for  D/c needs . Viki Acuna and her family were provided with choice of provider; she and her family are in agreement with the discharge plan. Care Transition Patient:  Yes    Haja Aparicio

## 2020-03-31 NOTE — CONSULTS
Nephrology Consult Note                                                                                                                                                                                                                                                                                                                                                               Office : [phone]     Fax :[phone]              Patient's Name: Avinash Shetes  12:25 AM  2020    Reason for Consult: WANDER   Requesting Physician:  Jhoan Zarate MD      Chief Complaint:  Abn labs     History of Present Ilness:    Ms. Liv Phipps  is a 65 y.o. female we are seeing in consultation for possible multiple myeloma. She presents now with abd pain and WANDER. Has marked hypercalcemia at this time. Was worked up for MM in 2017 with a negative BM and skeletal survey. At that time she was noted to have elevated light chains. Her BM showed some atypical megakaryocytes, but no excess or clonal plasma cells. Chromosome and FISH studies were negative.         Past Medical History:   Diagnosis Date    Allergic rhinitis     Anemia     CAD (coronary artery disease)     CAD (coronary artery disease)     Diverticulosis     GERD (gastroesophageal reflux disease)     History of colonoscopy     Hyperlipidemia     Hypertension     Myocardial infarction, inferior wall [facility]) 2006       Past Surgical History:   Procedure Laterality Date    BREAST REDUCTION SURGERY  Bilat     SECTION  x3    HYSTERECTOMY      TYMPANOPLASTY         Family History   Problem Relation Age of Onset    Heart Disease Mother     High Cholesterol Mother     Heart Disease Sister         hypertension    Diabetes Sister         hypertension    High Cholesterol Sister         hypertension    Heart Disease Brother     High Cholesterol Brother     Stroke Brother         hypertension        reports that she has been smoking.  She has a without diverticulitis. No bowel dilation or free air. Abdominal aortic aneurysm just above the bifurcation and involving the    right common iliac artery again noted measuring up to 3 cm. No free fluid. There are now multifocal lytic lesions involving the right and left iliac    bones, right L3 transverse process, thoracic and lumbar vertebral bodies    and left ninth rib may represent metastatic disease or multiple myeloma,    correlate with history. Assessment/Plan   1. WANDER     2. Likely MM     3. Anemia    4. Acid- base/ Electrolyte imbalance     5.  Hypercalcemia     Plan   - Pt has severe WANDER   - UO is better   - check Ur studies  - will need BM bx   - Pamidronate for Hypercalcemia   - No need for RRT today   - Monitor UO and renal function   - labs in am                      Thank you for allowing us to participate in care of [address] free to contact me   [facility] [address]  Office : [phone]  Fax :[phone]

## 2020-03-31 NOTE — H&P
Appearance: Normal appearance. HENT:      Head: Normocephalic and atraumatic. Mouth/Throat:      Mouth: Mucous membranes are moist.   Eyes:      Pupils: Pupils are equal, round, and reactive to light. Cardiovascular:      Rate and Rhythm: Normal rate and regular rhythm. Heart sounds: No murmur. Pulmonary:      Effort: Pulmonary effort is normal. No respiratory distress. Breath sounds: Normal breath sounds. Chest:      Breasts:         Right: Normal. No mass, nipple discharge, skin change or tenderness. Left: Normal. No mass, nipple discharge, skin change or tenderness. Abdominal:      General: Abdomen is flat. Bowel sounds are normal.      Palpations: Abdomen is soft. Tenderness: There is abdominal tenderness in the right upper quadrant. Musculoskeletal:         General: No swelling. Lymphadenopathy:      Cervical: No cervical adenopathy. Upper Body:      Right upper body: No supraclavicular or axillary adenopathy. Left upper body: No supraclavicular or axillary adenopathy. Lower Body: No right inguinal adenopathy. No left inguinal adenopathy. Skin:     General: Skin is warm. Capillary Refill: Capillary refill takes less than 2 seconds. Coloration: Skin is not jaundiced. Neurological:      General: No focal deficit present. Mental Status: She is alert and oriented to person, place, and time. Mental status is at baseline.    Psychiatric:         Mood and Affect: Mood normal.         Behavior: Behavior normal.     ===================================================================  Consults:   IP CONSULT TO PRIMARY CARE PROVIDER  IP CONSULT TO NEPHROLOGY  ===================================================================  Laboratory Data:    CBC:   Lab Results   Component Value Date    WBC 15.1 (H) 03/30/2020    HGB 9.8 (L) 03/30/2020    HCT 30.1 (L) 03/30/2020    MCV 90.3 03/30/2020     03/30/2020       BMP:   Lab Results   Component

## 2020-03-31 NOTE — ED PROVIDER NOTES
[address] [facility] ENCOUNTER          ATTENDING PHYSICIAN NOTE       Date of evaluation: 3/30/2020    ADDENDUM:      Care of this patient was assumed from Dr. Mendoza Salinas. The patient was seen for Abnormal Lab  . The patient's initial evaluation and plan have been discussed with the prior provider who initially evaluated the patient. Please see the original HPI and MDM for full details. Nursing Notes, Past Medical Hx, Past Surgical Hx, Social Hx, Allergies, and Family Hx were all reviewed.     Diagnostic Results     RADIOLOGY:  CT ABDOMEN PELVIS WO CONTRAST Additional Contrast? None    (Results Pending)       LABS:   Results for orders placed or performed during the hospital encounter of 03/30/20   CBC auto differential   Result Value Ref Range    WBC 15.1 (H) 4.0 - 11.0 K/uL    RBC 3.33 (L) 4.00 - 5.20 M/uL    Hemoglobin 9.8 (L) 12.0 - 16.0 g/dL    Hematocrit 30.1 (L) 36.0 - 48.0 %    MCV 90.3 80.0 - 100.0 fL    MCH 29.5 26.0 - 34.0 pg    MCHC 32.7 31.0 - 36.0 g/dL    RDW 14.2 12.4 - 15.4 %    Platelets 967 957 - 070 K/uL    MPV 8.3 5.0 - 10.5 fL    Neutrophils % 64.0 %    Lymphocytes % 25.0 %    Monocytes % 9.3 %    Eosinophils % 0.8 %    Basophils % 0.9 %    Neutrophils Absolute 9.6 (H) 1.7 - 7.7 K/uL    Lymphocytes Absolute 3.8 1.0 - 5.1 K/uL    Monocytes Absolute 1.4 (H) 0.0 - 1.3 K/uL    Eosinophils Absolute 0.1 0.0 - 0.6 K/uL    Basophils Absolute 0.1 0.0 - 0.2 K/uL   Basic Metabolic Panel   Result Value Ref Range    Sodium 136 136 - 145 mmol/L    Potassium 4.2 3.5 - 5.1 mmol/L    Chloride 90 (L) 99 - 110 mmol/L    CO2 23 21 - 32 mmol/L    Anion Gap 23 (H) 3 - 16    Glucose 99 70 - 99 mg/dL     (HH) 7 - 20 mg/dL    CREATININE 10.8 (HH) 0.6 - 1.2 mg/dL    GFR Non-African American 3 (A) >60    GFR  4 (A) >60    Calcium 14.0 (HH) 8.3 - 10.6 mg/dL   Lipase   Result Value Ref Range    Lipase 11.0 (L) 13.0 - 60.0 U/L   Hepatic function panel (LFTs)   Result Value Ref Range Total Protein 9.3 (H) 6.4 - 8.2 g/dL    Alb 4.1 3.4 - 5.0 g/dL    Alkaline Phosphatase 90 40 - 129 U/L    ALT 8 (L) 10 - 40 U/L    AST 16 15 - 37 U/L    Total Bilirubin 0.3 0.0 - 1.0 mg/dL    Bilirubin, Direct <0.2 0.0 - 0.3 mg/dL    Bilirubin, Indirect see below 0.0 - 1.0 mg/dL   Urinalysis   Result Value Ref Range    Color, UA Yellow Straw/Yellow    Clarity, UA Clear Clear    Glucose, Ur Negative Negative mg/dL    Bilirubin Urine Negative Negative    Ketones, Urine Negative Negative mg/dL    Specific Gravity, UA 1.025 1.005 - 1.030    Blood, Urine TRACE-INTACT (A) Negative    pH, UA 6.0 5.0 - 8.0    Protein, UA 30 (A) Negative mg/dL    Urobilinogen, Urine 0.2 <2.0 E.U./dL    Nitrite, Urine Negative Negative    Leukocyte Esterase, Urine SMALL (A) Negative    Microscopic Examination YES     Urine Type Voided    Phosphorus   Result Value Ref Range    Phosphorus 8.4 (H) 2.5 - 4.9 mg/dL   Sodium, urine, random   Result Value Ref Range    Sodium, Ur 53 Not Established mmol/L   Microscopic Urinalysis   Result Value Ref Range    WBC, UA 6-10 (A) 0 - 5 /HPF    RBC, UA 3-4 0 - 4 /HPF    Epithelial Cells, UA 2-5 0 - 5 /HPF    Bacteria, UA 3+ (A) None Seen /HPF       RECENT VITALS:  BP: 126/62, Temp: 98.7 °F (37.1 °C), Pulse: 70, Resp: 14, SpO2: 95 %     Procedures   Procedures    ED Course     The patient was given the following medications:  Orders Placed This Encounter   Medications    0.9 % sodium chloride bolus    ondansetron (ZOFRAN) injection 4 mg    0.9 % sodium chloride infusion       CONSULTS:  IP CONSULT TO PRIMARY CARE PROVIDER    MEDICAL DECISION MAKING / ASSESSMENT / Amadeojose cruz Schreiber is a 65 y.o. female with history of hypertension, CAD, stroke presenting for abnormal labs. Please see the original HPI and MDM for full details. Patient pending repeat laboratory studies to confirm elevated BUN/creatinine, hypercalcemia at time of signout.   Her labs for stably abnormal compared to outpatient labs that were obtained earlier today. Patient received IV fluids. She is hemodynamically stable and alert. Urine electrolytes sent and are pending. No indication for imaging in the emergency department at this time, though she may need renal ultrasound in the future. Discussed with Dr. Elizabeth Matos who agreed with admission. Patient will be admitted under Dr. Jayna Tinajero, case also discussed with AOD. Patient admitted in stable condition for further care and management. Clinical Impression     1. Acute renal failure, unspecified acute renal failure type ([address]    2. Hypercalcemia    3. Hyperphosphatemia        Disposition     PATIENT REFERRED TO:  No follow-up provider specified.     DISCHARGE MEDICATIONS:  New Prescriptions    No medications on file       DISPOSITION Decision To Admit 03/30/2020 10:52:54 PM       Airam Varner MD  03/30/20 [id number]

## 2020-03-31 NOTE — PROGRESS NOTES
PROGRESS NOTE    Admit Date: 3/30/2020  Diet: DIET RENAL;    CC: nausea, vomiting, abnormal labs    Interval History:   No acute events overnight. Patient stated that she has not urinated since being taken up to the 6th floor last night. No complaints this AM. No chest pain, SOB, nausea, vomiting, fever, or chills. HPI:   Abdiel Hyatt is a 65 y.o. female h/o hypertension, porcelain gallbladder and CAD who presented to the hospital with one week history of nausea and few episodes of vomiting and diarrhea. Patient states she has lost her appetite, her weight on 01/28/19 was 150 lbs, her weight now is 127 lbs (-23 lbs.). She was seen in PCP's office, lab work was ordered which showed a very elevated creatinine and she was referred to the emergency department. Patient denies taking NSAID's, antacids. She states she does take vitamin D but she is not sure how many units.      Of note, patient has had a previous BM biopsy for work up of MM, which was negative. CT chest in 2017 did not show a lung mass. CT abdomen/pelvis was performed today which showed known porcelain gallbladder and new multifocal lytic lesions.      Medications:     Scheduled Meds:   amLODIPine  5 mg Oral Daily    atorvastatin  40 mg Oral Nightly    metoprolol tartrate  25 mg Oral BID    sodium chloride flush  10 mL Intravenous 2 times per day    heparin (porcine)  5,000 Units Subcutaneous 3 times per day     Continuous Infusions:   sodium chloride 150 mL/hr at 03/31/20 0041     PRN Meds:sodium chloride flush, acetaminophen **OR** acetaminophen, polyethylene glycol, promethazine **OR** ondansetron    Objective:   Vitals:   T-max:  Patient Vitals for the past 8 hrs:   BP Temp Temp src Pulse Resp SpO2   03/31/20 0833 118/62 98.8 °F (37.1 °C) Oral 79 18 97 %   03/31/20 0251 112/74 98.8 °F (37.1 °C) Oral 70 18 95 %     No intake or output data in the 24 hours ending 03/31/20 0846    Review of Systems:  All 13 review of systems are negative except results for input(s): PHART, NDN0FDG, PO2ART, RDP4BWS, BEART, THGBART, J0UVWLEV, LLQ4TAX in the last 72 hours. INR: No results for input(s): INR in the last 72 hours. Lactate: No results for input(s): LACTATE in the last 72 hours. Cultures:  -----------------------------------------------------------------  RAD:   CT ABDOMEN PELVIS WO CONTRAST Additional Contrast? None   Final Result       The gallbladder is again distended with areas of peripheral wall    calcification consistent with history of porcelain gallbladder. No    surrounding inflammatory change. Diverticulosis without diverticulitis. No bowel dilation or free air. Abdominal aortic aneurysm just above the bifurcation and involving the    right common iliac artery again noted measuring up to 3 cm. No free fluid. There are now multifocal lytic lesions involving the right and left iliac    bones, right L3 transverse process, thoracic and lumbar vertebral bodies    and left ninth rib may represent metastatic disease or multiple myeloma,    correlate with history.              ============================================================================================  Assessment/Plan:   Viraj Ramirez is a 65 y.o. female h/o hypertension, porcelain gallbladder and CAD admitted to the hospital with acute kidney injury and hypercalcemia.      ## Acute kidney injury 2/2 Hypercalcemia - Euvolemic on exam, CT abdomen pelvis completed to rule out obstruction, noted new lytic bone lesions. Discussed with Dr. Armando Hubbard from Nephrology. Suspect nausea and vomiting from renal rather than GI pathology. New hypercalcemia, anemia, new lytic bone lesions, elevated total protein.   -- hold Lisinopril-HCTZ   -- SPEP:  pending  -- Vitamin D 25, PTH level:  pending  -- Strict I/Os  -- NS @ 150 cc/hr   -- Nephrology consulted     ## Hypercalcemia - patient does take hydrochlorothiazide combination pill at home but given severe hypercalcemia

## 2020-03-31 NOTE — ED PROVIDER NOTES
[address] [facility]          ATTENDING PHYSICIAN NOTE       Date of evaluation: 3/30/2020    Chief Complaint     Abnormal Lab      History of Present Illness     Alka Patterson is a 65 y.o. female who presents for abnormal labs. Patient states she saw her primary care doctor today for a regular checkup. She had lab work performed today and received a phone call that her labs were abnormal.  Patient reported weight loss over the last few months, unable to quantify amount of weight loss and over what period of time. Patient states she just does not have an appetite. Denied any vomiting or diarrhea. Denied any fevers or chills. Per chart review patient presented to Dr. Velazquez Jiménez office today for nausea, vomiting, right upper quadrant pain. She has a history of porcelain gallbladder was supposed to have her gallbladder removed several years ago, but she has not. Patient has reported significant weight loss over the last few months. Review of Systems     Review of Systems   Constitutional: Positive for appetite change. Negative for activity change, chills and fever. Respiratory: Negative for chest tightness and shortness of breath. Cardiovascular: Negative for chest pain. Gastrointestinal: Negative for abdominal pain, diarrhea and vomiting. Decreased appetite, weight loss   Genitourinary: Negative. Musculoskeletal: Negative. Skin: Negative. Neurological: Negative. Past Medical, Surgical, Family, and Social History     She has a past medical history of Allergic rhinitis, Anemia, CAD (coronary artery disease), CAD (coronary artery disease), Diverticulosis, GERD (gastroesophageal reflux disease), History of colonoscopy, Hyperlipidemia, Hypertension, and Myocardial infarction, inferior wall ([address] She has a past surgical history that includes  section (x3); Hysterectomy; Tympanoplasty; and Breast reduction surgery (Bilat).   Her family Results     EKG at 2154  EKG Interpretation    Interpreted by me    Rhythm: normal sinus   Rate: normal, 75  Axis: normal  Ectopy: none  Conduction: normal  ST Segments: no acute change  T Waves: no acute change  Q Waves: II, III, VF (unchanged from prior)     Clinical Impression: no acute infarction      RADIOLOGY:  No orders to display       LABS:   No results found for this visit on 03/30/20. RECENT VITALS:  BP: (!) 143/64,Temp: 98.7 °F (37.1 °C), Pulse: 70, Resp: 20, SpO2: (!) 70 %     Procedures     none    ED Course     Nursing Notes, Past Medical Hx, Past Surgical Hx, Social Hx,Allergies, and Family Hx were reviewed. patient was given the following medications:  No orders of the defined types were placed in this encounter. CONSULTS:  None    MEDICAL DECISIONMAKING / ASSESSMENT / PLAN     Bc Hassan is a 65 y.o. female who presents to the ER due to abnormal labs that were obtained today at her primary care office. She saw her primary care doctor due to nausea, vomiting, right upper quadrant pain, and weight loss. She only noted weight loss with me here in the ER. She had a benign abdominal exam.  Vital signs on ED arrival were stable and reassuring. Per chart review patient's labs showed significantly elevated BUN and creatinine as well as hypercalcemia. Repeat labs were ordered here, patient was started on IV fluids, presuming that these renal and electrolyte abnormalities are correct and not a lab error. Transfer of care to oncoming provider pending results of labs and ultimate disposition. Clinical Impression     No diagnosis found. Disposition     PATIENT REFERRED TO:  No follow-up provider specified.     DISCHARGE MEDICATIONS:  New Prescriptions    No medications on file       Connie Whitehead MD  04/06/20 4097

## 2020-03-31 NOTE — ED TRIAGE NOTES
Has had intermittent right shoulder pain that radiates into right jaw, loss of appetite, vomiting and diarrhea since last Wednesday. Went to PCP today and had labs drawn. Here PCP called tonight and told her to go toER for abnormal kidney labs. Skin natural warm, dry  resps unlabored.   Denies pain at present

## 2020-04-01 ENCOUNTER — TELEPHONE (OUTPATIENT)
Dept: INTERNAL MEDICINE CLINIC | Age: 70
End: 2020-04-01

## 2020-04-01 PROBLEM — C90.00 MULTIPLE MYELOMA NOT HAVING ACHIEVED REMISSION (HCC): Status: ACTIVE | Noted: 2020-04-01

## 2020-04-01 PROBLEM — E83.52 HYPERCALCEMIA: Status: ACTIVE | Noted: 2020-04-01

## 2020-04-01 LAB
A/G RATIO: 0.8 (ref 1.1–2.2)
ALBUMIN SERPL-MCNC: 3 G/DL (ref 3.4–5)
ALP BLD-CCNC: 64 U/L (ref 40–129)
ALT SERPL-CCNC: 6 U/L (ref 10–40)
ANION GAP SERPL CALCULATED.3IONS-SCNC: 21 MMOL/L (ref 3–16)
AST SERPL-CCNC: 14 U/L (ref 15–37)
BASOPHILS ABSOLUTE: 0.1 K/UL (ref 0–0.2)
BASOPHILS RELATIVE PERCENT: 0.6 %
BETA-2 MICROGLOBULIN: 20.8 MG/L (ref 1.1–2.4)
BILIRUB SERPL-MCNC: <0.2 MG/DL (ref 0–1)
BUN BLDV-MCNC: 108 MG/DL (ref 7–20)
CALCIUM SERPL-MCNC: 9.9 MG/DL (ref 8.3–10.6)
CHLORIDE BLD-SCNC: 106 MMOL/L (ref 99–110)
CO2: 14 MMOL/L (ref 21–32)
CREAT SERPL-MCNC: 11.6 MG/DL (ref 0.6–1.2)
EOSINOPHILS ABSOLUTE: 0.1 K/UL (ref 0–0.6)
EOSINOPHILS RELATIVE PERCENT: 0.9 %
GFR AFRICAN AMERICAN: 4
GFR NON-AFRICAN AMERICAN: 3
GLOBULIN: 3.8 G/DL
GLUCOSE BLD-MCNC: 66 MG/DL (ref 70–99)
HBV SURFACE AB TITR SER: >1000 MIU/ML
HCT VFR BLD CALC: 24.7 % (ref 36–48)
HEMOGLOBIN: 8.1 G/DL (ref 12–16)
HEPATITIS B SURFACE ANTIGEN INTERPRETATION: NORMAL
INR BLD: 1.13 (ref 0.86–1.14)
KAPPA, FREE LIGHT CHAINS, SERUM: 155.86 MG/L (ref 3.3–19.4)
KAPPA/LAMBDA RATIO: ABNORMAL (ref 0.26–1.65)
KAPPA/LAMBDA TEST COMMENT: ABNORMAL
LAMBDA, FREE LIGHT CHAINS, SERUM: ABNORMAL MG/L (ref 5.71–26.3)
LYMPHOCYTES ABSOLUTE: 1.6 K/UL (ref 1–5.1)
LYMPHOCYTES RELATIVE PERCENT: 17.1 %
MCH RBC QN AUTO: 29.9 PG (ref 26–34)
MCHC RBC AUTO-ENTMCNC: 32.8 G/DL (ref 31–36)
MCV RBC AUTO: 91 FL (ref 80–100)
MONOCYTES ABSOLUTE: 0.8 K/UL (ref 0–1.3)
MONOCYTES RELATIVE PERCENT: 8.2 %
NEUTROPHILS ABSOLUTE: 6.7 K/UL (ref 1.7–7.7)
NEUTROPHILS RELATIVE PERCENT: 73.2 %
PDW BLD-RTO: 14.9 % (ref 12.4–15.4)
PLATELET # BLD: 185 K/UL (ref 135–450)
PMV BLD AUTO: 8.3 FL (ref 5–10.5)
POTASSIUM SERPL-SCNC: 4.2 MMOL/L (ref 3.5–5.1)
PROTHROMBIN TIME: 13.1 SEC (ref 10–13.2)
RBC # BLD: 2.71 M/UL (ref 4–5.2)
SODIUM BLD-SCNC: 141 MMOL/L (ref 136–145)
TOTAL PROTEIN: 6.8 G/DL (ref 6.4–8.2)
WBC # BLD: 9.1 K/UL (ref 4–11)

## 2020-04-01 PROCEDURE — 96401 CHEMO ANTI-NEOPL SQ/IM: CPT

## 2020-04-01 PROCEDURE — 5A1D70Z PERFORMANCE OF URINARY FILTRATION, INTERMITTENT, LESS THAN 6 HOURS PER DAY: ICD-10-PCS | Performed by: INTERNAL MEDICINE

## 2020-04-01 PROCEDURE — 2060000000 HC ICU INTERMEDIATE R&B

## 2020-04-01 PROCEDURE — 2580000003 HC RX 258: Performed by: INTERNAL MEDICINE

## 2020-04-01 PROCEDURE — 85610 PROTHROMBIN TIME: CPT

## 2020-04-01 PROCEDURE — 86706 HEP B SURFACE ANTIBODY: CPT

## 2020-04-01 PROCEDURE — 80053 COMPREHEN METABOLIC PANEL: CPT

## 2020-04-01 PROCEDURE — 2580000003 HC RX 258: Performed by: STUDENT IN AN ORGANIZED HEALTH CARE EDUCATION/TRAINING PROGRAM

## 2020-04-01 PROCEDURE — 87340 HEPATITIS B SURFACE AG IA: CPT

## 2020-04-01 PROCEDURE — 85025 COMPLETE CBC W/AUTO DIFF WBC: CPT

## 2020-04-01 PROCEDURE — 87081 CULTURE SCREEN ONLY: CPT

## 2020-04-01 PROCEDURE — 2500000003 HC RX 250 WO HCPCS: Performed by: INTERNAL MEDICINE

## 2020-04-01 PROCEDURE — 99232 SBSQ HOSP IP/OBS MODERATE 35: CPT | Performed by: INTERNAL MEDICINE

## 2020-04-01 PROCEDURE — 86704 HEP B CORE ANTIBODY TOTAL: CPT

## 2020-04-01 PROCEDURE — 6370000000 HC RX 637 (ALT 250 FOR IP): Performed by: STUDENT IN AN ORGANIZED HEALTH CARE EDUCATION/TRAINING PROGRAM

## 2020-04-01 PROCEDURE — 36415 COLL VENOUS BLD VENIPUNCTURE: CPT

## 2020-04-01 PROCEDURE — 6360000002 HC RX W HCPCS: Performed by: INTERNAL MEDICINE

## 2020-04-01 RX ORDER — NICOTINE POLACRILEX 4 MG
15 LOZENGE BUCCAL PRN
Status: DISCONTINUED | OUTPATIENT
Start: 2020-04-01 | End: 2020-04-07 | Stop reason: HOSPADM

## 2020-04-01 RX ORDER — DEXAMETHASONE 4 MG/1
40 TABLET ORAL DAILY
Status: COMPLETED | OUTPATIENT
Start: 2020-04-01 | End: 2020-04-04

## 2020-04-01 RX ORDER — DEXTROSE MONOHYDRATE 25 G/50ML
12.5 INJECTION, SOLUTION INTRAVENOUS PRN
Status: DISCONTINUED | OUTPATIENT
Start: 2020-04-01 | End: 2020-04-07 | Stop reason: HOSPADM

## 2020-04-01 RX ORDER — INSULIN LISPRO 100 [IU]/ML
0-3 INJECTION, SOLUTION INTRAVENOUS; SUBCUTANEOUS NIGHTLY
Status: DISCONTINUED | OUTPATIENT
Start: 2020-04-01 | End: 2020-04-01

## 2020-04-01 RX ORDER — DEXTROSE MONOHYDRATE 50 MG/ML
100 INJECTION, SOLUTION INTRAVENOUS PRN
Status: DISCONTINUED | OUTPATIENT
Start: 2020-04-01 | End: 2020-04-07 | Stop reason: HOSPADM

## 2020-04-01 RX ORDER — DEXAMETHASONE 4 MG/1
10 TABLET ORAL
Status: DISCONTINUED | OUTPATIENT
Start: 2020-04-08 | End: 2020-04-07 | Stop reason: HOSPADM

## 2020-04-01 RX ORDER — INSULIN LISPRO 100 [IU]/ML
0-6 INJECTION, SOLUTION INTRAVENOUS; SUBCUTANEOUS
Status: DISCONTINUED | OUTPATIENT
Start: 2020-04-01 | End: 2020-04-01

## 2020-04-01 RX ADMIN — ATORVASTATIN CALCIUM 40 MG: 40 TABLET, FILM COATED ORAL at 20:30

## 2020-04-01 RX ADMIN — AMLODIPINE BESYLATE 5 MG: 5 TABLET ORAL at 09:44

## 2020-04-01 RX ADMIN — BORTEZOMIB 2.1 MG: 3.5 INJECTION, POWDER, LYOPHILIZED, FOR SOLUTION INTRAVENOUS; SUBCUTANEOUS at 16:15

## 2020-04-01 RX ADMIN — METOPROLOL TARTRATE 25 MG: 25 TABLET, FILM COATED ORAL at 09:44

## 2020-04-01 RX ADMIN — Medication 10 ML: at 09:45

## 2020-04-01 RX ADMIN — DEXAMETHASONE 40 MG: 4 TABLET ORAL at 16:14

## 2020-04-01 RX ADMIN — METOPROLOL TARTRATE 25 MG: 25 TABLET, FILM COATED ORAL at 20:30

## 2020-04-01 RX ADMIN — SODIUM BICARBONATE: 84 INJECTION, SOLUTION INTRAVENOUS at 13:30

## 2020-04-01 RX ADMIN — Medication 10 ML: at 20:31

## 2020-04-01 ASSESSMENT — PAIN SCALES - GENERAL
PAINLEVEL_OUTOF10: 0

## 2020-04-01 NOTE — PROGRESS NOTES
Report called to [facility] in [facility]. Transfer patient to [phone].

## 2020-04-01 NOTE — PROGRESS NOTES
grossly intact  CATHETER: PIV    Data    CBC:   Recent Labs     207 20  0954 20  0815   WBC 13.8* 14.3* 9.1   HGB 8.9* 9.4* 8.1*   HCT 27.1* 29.9* 24.7*   MCV 89.9 94.1 91.0    247 185     BMP/Mag:  Recent Labs     207 20  0815    137 141   K 4.2 4.4 4.2   CL 90* 97* 106   CO2 23 19* 14*   PHOS 8.4* 7.1*  --    * 112* 108*   CREATININE 10.8* 10.7* 11.6*     LIVP:   Recent Labs     20  0815   AST 15 16 14*   ALT 9* 8* 6*   LIPASE 13.0 11.0*  --    BILIDIR  --  <0.2  --    BILITOT 0.3 0.3 <0.2   ALKPHOS 96 90 64     Coags: No results for input(s): PROTIME, INR, APTT in the last 72 hours. Uric Acid No results for input(s): LABURIC in the last 72 hours. PROBLEM LIST:             1.  Multiple Myeloma       TREATMENT:            1.  Velcade, Dex    -Cycle 1 Day 1:  20     ASSESSMENT AND PLAN:             1. Newly diagnosed Multiple Myeloma:  -Presented with hypercalcemia, WANDER (Cr. 9.2), anemia  -MMP   Beta 2 Microglobulin:  20.8   Lambda LC:  >74,800, Kappa - ratio:  Unable to calculate   SPEP/UPEP:  Pending   BM biopsy 3/31/20:  Clustered plasma cells account for about 80-90% of cells with sheets of plasma cells effacing the normal marrow elements. FISH and CG pending   Skeletal survey:  Several small progressive lytic lesions in the skull. Plan:  Start Velcade 1.3 mg SubQ and Dex 40 mg PO x 4 days today   Cycle 1 Day 1    2. ID: Patient is currently afebrile without evidence of infection.   - Start Diflucan, Valtrex and Levaquin prophylaxis when ANC <1.5    3. Heme:  Anemia r/t disease  - Transfuse for Hgb < 7 and Platelets < 10 K.   - No transfusion today     4. Metabolic:  Electrolytes abnormalities with AG metabolic acidosis.   Admitted with WANDER d/t MM, will need HD, fair UOP  - Cont D5 + 150 mEq NaHCO3 @ 75 cc/hr  -Consult Nephrology, appreciate recs (Nicol Murray)  -Plan to insert tunneled HD

## 2020-04-01 NOTE — PROGRESS NOTES
VSS. Reassessment completed. Scheduled meds given whole with water. Pt resting comfortably in bed. Call light within reach. Bed alarm activated. Denies further needs at this time. Will continue to monitor.  Electronically signed by Bernadette Palacios RN on 4/1/2020 at 4:28 PM

## 2020-04-02 ENCOUNTER — APPOINTMENT (OUTPATIENT)
Dept: INTERVENTIONAL RADIOLOGY/VASCULAR | Age: 70
DRG: 841 | End: 2020-04-02
Payer: MEDICARE

## 2020-04-02 LAB
ALBUMIN SERPL-MCNC: 2.9 G/DL (ref 3.4–5)
ANION GAP SERPL CALCULATED.3IONS-SCNC: 18 MMOL/L (ref 3–16)
BUN BLDV-MCNC: 106 MG/DL (ref 7–20)
CALCIUM SERPL-MCNC: 9.8 MG/DL (ref 8.3–10.6)
CHLORIDE BLD-SCNC: 106 MMOL/L (ref 99–110)
CO2: 18 MMOL/L (ref 21–32)
CREAT SERPL-MCNC: 12 MG/DL (ref 0.6–1.2)
GFR AFRICAN AMERICAN: 4
GFR NON-AFRICAN AMERICAN: 3
GLUCOSE BLD-MCNC: 179 MG/DL (ref 70–99)
HCT VFR BLD CALC: 26 % (ref 36–48)
HEMOGLOBIN: 8.7 G/DL (ref 12–16)
MCH RBC QN AUTO: 30 PG (ref 26–34)
MCHC RBC AUTO-ENTMCNC: 33.3 G/DL (ref 31–36)
MCV RBC AUTO: 90.3 FL (ref 80–100)
PDW BLD-RTO: 14.2 % (ref 12.4–15.4)
PHOSPHORUS: 5.7 MG/DL (ref 2.5–4.9)
PLATELET # BLD: 171 K/UL (ref 135–450)
PMV BLD AUTO: 8.5 FL (ref 5–10.5)
POTASSIUM SERPL-SCNC: 4.3 MMOL/L (ref 3.5–5.1)
RBC # BLD: 2.88 M/UL (ref 4–5.2)
SODIUM BLD-SCNC: 142 MMOL/L (ref 136–145)
WBC # BLD: 8 K/UL (ref 4–11)

## 2020-04-02 PROCEDURE — 85027 COMPLETE CBC AUTOMATED: CPT

## 2020-04-02 PROCEDURE — 80069 RENAL FUNCTION PANEL: CPT

## 2020-04-02 PROCEDURE — C1894 INTRO/SHEATH, NON-LASER: HCPCS

## 2020-04-02 PROCEDURE — 2060000000 HC ICU INTERMEDIATE R&B

## 2020-04-02 PROCEDURE — 6360000002 HC RX W HCPCS

## 2020-04-02 PROCEDURE — 02HV33Z INSERTION OF INFUSION DEVICE INTO SUPERIOR VENA CAVA, PERCUTANEOUS APPROACH: ICD-10-PCS | Performed by: RADIOLOGY

## 2020-04-02 PROCEDURE — 2580000003 HC RX 258: Performed by: STUDENT IN AN ORGANIZED HEALTH CARE EDUCATION/TRAINING PROGRAM

## 2020-04-02 PROCEDURE — 36415 COLL VENOUS BLD VENIPUNCTURE: CPT

## 2020-04-02 PROCEDURE — 90935 HEMODIALYSIS ONE EVALUATION: CPT

## 2020-04-02 PROCEDURE — 6370000000 HC RX 637 (ALT 250 FOR IP): Performed by: STUDENT IN AN ORGANIZED HEALTH CARE EDUCATION/TRAINING PROGRAM

## 2020-04-02 PROCEDURE — C1881 DIALYSIS ACCESS SYSTEM: HCPCS

## 2020-04-02 PROCEDURE — 6360000002 HC RX W HCPCS: Performed by: STUDENT IN AN ORGANIZED HEALTH CARE EDUCATION/TRAINING PROGRAM

## 2020-04-02 PROCEDURE — 99152 MOD SED SAME PHYS/QHP 5/>YRS: CPT

## 2020-04-02 PROCEDURE — 2580000003 HC RX 258: Performed by: INTERNAL MEDICINE

## 2020-04-02 PROCEDURE — 77001 FLUOROGUIDE FOR VEIN DEVICE: CPT

## 2020-04-02 PROCEDURE — 2580000003 HC RX 258

## 2020-04-02 PROCEDURE — 99153 MOD SED SAME PHYS/QHP EA: CPT

## 2020-04-02 PROCEDURE — 2500000003 HC RX 250 WO HCPCS: Performed by: INTERNAL MEDICINE

## 2020-04-02 PROCEDURE — 6360000002 HC RX W HCPCS: Performed by: INTERNAL MEDICINE

## 2020-04-02 PROCEDURE — 6370000000 HC RX 637 (ALT 250 FOR IP): Performed by: INTERNAL MEDICINE

## 2020-04-02 PROCEDURE — C1769 GUIDE WIRE: HCPCS

## 2020-04-02 PROCEDURE — 36558 INSERT TUNNELED CV CATH: CPT

## 2020-04-02 PROCEDURE — 36592 COLLECT BLOOD FROM PICC: CPT

## 2020-04-02 PROCEDURE — 99232 SBSQ HOSP IP/OBS MODERATE 35: CPT | Performed by: INTERNAL MEDICINE

## 2020-04-02 RX ORDER — HEPARIN SODIUM 1000 [USP'U]/ML
3200 INJECTION, SOLUTION INTRAVENOUS; SUBCUTANEOUS PRN
Status: DISCONTINUED | OUTPATIENT
Start: 2020-04-02 | End: 2020-04-07 | Stop reason: HOSPADM

## 2020-04-02 RX ORDER — ACYCLOVIR 400 MG/1
400 TABLET ORAL 2 TIMES DAILY
Status: DISCONTINUED | OUTPATIENT
Start: 2020-04-02 | End: 2020-04-07 | Stop reason: HOSPADM

## 2020-04-02 RX ADMIN — Medication 10 ML: at 21:00

## 2020-04-02 RX ADMIN — SODIUM BICARBONATE: 84 INJECTION, SOLUTION INTRAVENOUS at 02:36

## 2020-04-02 RX ADMIN — PROMETHAZINE HYDROCHLORIDE 12.5 MG: 12.5 TABLET ORAL at 20:05

## 2020-04-02 RX ADMIN — DEXAMETHASONE 40 MG: 4 TABLET ORAL at 17:20

## 2020-04-02 RX ADMIN — Medication 10 ML: at 08:21

## 2020-04-02 RX ADMIN — ACYCLOVIR 400 MG: 400 TABLET ORAL at 21:40

## 2020-04-02 RX ADMIN — SODIUM BICARBONATE: 84 INJECTION, SOLUTION INTRAVENOUS at 18:20

## 2020-04-02 RX ADMIN — HEPARIN SODIUM 5000 UNITS: 5000 INJECTION INTRAVENOUS; SUBCUTANEOUS at 21:40

## 2020-04-02 RX ADMIN — METOPROLOL TARTRATE 25 MG: 25 TABLET, FILM COATED ORAL at 21:40

## 2020-04-02 RX ADMIN — METOPROLOL TARTRATE 25 MG: 25 TABLET, FILM COATED ORAL at 08:21

## 2020-04-02 RX ADMIN — AMLODIPINE BESYLATE 5 MG: 5 TABLET ORAL at 08:21

## 2020-04-02 RX ADMIN — ATORVASTATIN CALCIUM 40 MG: 40 TABLET, FILM COATED ORAL at 21:40

## 2020-04-02 ASSESSMENT — PAIN SCALES - GENERAL
PAINLEVEL_OUTOF10: 0

## 2020-04-02 NOTE — PROGRESS NOTES
dexamethasone (DECADRON) tablet 10 mg, Once per day on Wed Sat  amLODIPine (NORVASC) tablet 5 mg, Daily  atorvastatin (LIPITOR) tablet 40 mg, Nightly  metoprolol tartrate (LOPRESSOR) tablet 25 mg, BID  sodium chloride flush 0.9 % injection 10 mL, 2 times per day  sodium chloride flush 0.9 % injection 10 mL, PRN  acetaminophen (TYLENOL) tablet 650 mg, Q6H PRN    Or  acetaminophen (TYLENOL) suppository 650 mg, Q6H PRN  polyethylene glycol (GLYCOLAX) packet 17 g, Daily PRN  promethazine (PHENERGAN) tablet 12.5 mg, Q6H PRN    Or  ondansetron (ZOFRAN) injection 4 mg, Q6H PRN  [Held by provider] heparin (porcine) injection 5,000 Units, 3 times per day        Review of Systems:   14 point ROS obtained but were negative except mentioned in HPI      Physical exam:     Vitals:  /68   Pulse 71   Temp 98.4 °F (36.9 °C) (Oral)   Resp 21   Ht 5' 5\" (1.651 m)   Wt 127 lb (57.6 kg)   SpO2 93%   BMI 21.13 kg/m²   Constitutional:  OAA X3 NAD  Skin: no rash, turgor wnl  Heent:  eomi, mmm  Neck: no bruits or jvd noted  Cardiovascular:  S1, S2 without m/r/g  Respiratory: CTA B without w/r/r  Abdomen:  +bs, soft, nt, nd  Ext: + lower extremity edema  Psychiatric: mood and affect appropriate  Musculoskeletal:  Rom, muscular strength intact    Data:   Labs:  CBC:   Recent Labs     03/31/20 0427 03/31/20  0954 04/01/20  0815   WBC 13.8* 14.3* 9.1   HGB 8.9* 9.4* 8.1*    247 185     BMP:    Recent Labs     03/30/20 2148 03/31/20 0427 04/01/20  0815    137 141   K 4.2 4.4 4.2   CL 90* 97* 106   CO2 23 19* 14*   * 112* 108*   CREATININE 10.8* 10.7* 11.6*   GLUCOSE 99 82 66*     Ca/Mg/Phos:   Recent Labs     03/30/20 2148 03/31/20 0427 04/01/20  0815   CALCIUM 14.0* 12.5* 9.9   PHOS 8.4* 7.1*  --      Hepatic:   Recent Labs     03/30/20  1426 03/30/20  2148 04/01/20  0815   AST 15 16 14*   ALT 9* 8* 6*   BILITOT 0.3 0.3 <0.2   ALKPHOS 96 90 64     Troponin: No results for input(s): TROPONINI in the last 72

## 2020-04-02 NOTE — PROGRESS NOTES
VSS. Assessment completed. Scheduled meds given whole with a sip of water. New PIV started. Tolerated well. Blood return noted. Pt resting comfortably in bed. Call light within reach. Bed alarm activated. Denies further needs at this time. Will continue to monitor.  Electronically signed by Eric Hubbard RN on 4/2/2020 at 8:30 AM

## 2020-04-02 NOTE — PROGRESS NOTES
adenopathy  BACK: Straight negative CVAT  SKIN: warm dry and intact without lesions rashes or masses  CHEST: CTA bilaterally without use of accessory muscles  CV: Normal S1 S2, RRR, no MRG  ABD: NT ND normoactive BS, no palpable masses or hepatosplenomegaly  EXTREMITIES: without edema, denies calf tenderness  NEURO: CN II - XII grossly intact  CATHETER: PIV    Data    CBC:   Recent Labs     20  0954 20  0815 20  0403   WBC 14.3* 9.1 8.0   HGB 9.4* 8.1* 8.7*   HCT 29.9* 24.7* 26.0*   MCV 94.1 91.0 90.3    185 171     BMP/Mag:  Recent Labs     20  2148 20  0427 20  0815 20  0403    137 141 142   K 4.2 4.4 4.2 4.3   CL 90* 97* 106 106   CO2 23 19* 14* 18*   PHOS 8.4* 7.1*  --  5.7*   * 112* 108* 106*   CREATININE 10.8* 10.7* 11.6* 12.0*     LIVP:   Recent Labs     20  1426 20  2148 20  0815   AST 15 16 14*   ALT 9* 8* 6*   LIPASE 13.0 11.0*  --    BILIDIR  --  <0.2  --    BILITOT 0.3 0.3 <0.2   ALKPHOS 96 90 64     Coags:   Recent Labs     20  1148   PROTIME 13.1   INR 1.13     Uric Acid No results for input(s): LABURIC in the last 72 hours. PROBLEM LIST:                   TREATMENT:                 ASSESSMENT AND PLAN:          Newly diagnosed Multiple Myeloma:  -Presented with hypercalcemia, WANDER (Cr. 9.2), anemia  -MMP              Beta 2 Microglobulin:  20.8              Lambda LC:  >74,800, Kappa - ratio:  Unable to calculate              SPEP/UPEP:  Pending              BM biopsy 3/31/20:  Clustered plasma cells account for about 80-90% of cells with sheets of plasma cells effacing the normal marrow elements. FISH and CG pending              Skeletal survey:  Several small progressive lytic lesions in the skull.     Plan:  Started  Velcade 1.3 mg SubQ and Dex 40 mg PO x 4 days on 2020               Cycle 1 Day 2     2.  ID: Patient is currently afebrile without evidence of infection.   - Start Diflucan, Valtrex and Levaquin prophylaxis when ANC <1.5     3. Heme:  Anemia r/t disease  - Transfuse for Hgb < 7 and Platelets < 10 K.   - No transfusion today      4. Metabolic:  Electrolytes abnormalities with AG metabolic acidosis. Admitted with WANDER d/t MM, will need HD, fair UOP  - Cont D5 + 150 mEq NaHCO3 @ 75 cc/hr  -Consult Nephrology, appreciate recs (Tabitha Pitts)  -Scheduled for  tunneled HD today  and  HD   - Replace Potassium and Magnesium per protocol.   -s/p Calcitonin 200 mg x 1 and pamidoronate 60 mg IV x 1 (3/31/20)- corrected calcium trending down        5. Nutrition:     - Cont low microbial diet  - Dietary to follow closely      6.   Hypoglycemia:  -Monitor Q 12 hours x 24 hours    - DVT Prophylaxis: Platelets >51,883 cells/dL, - daily heparin  prophylaxis ordered  Contraindications to pharmacologic prophylaxis: None  Contraindications to mechanical prophylaxis: None    - Disposition: unknown       Eli Velazco MD

## 2020-04-02 NOTE — PROGRESS NOTES
VSS. Reassessment completed. Pt resting comfortably in bed. Call light within reach. Bed alarm activated. Denies further needs at this time. Will continue to monitor.  Electronically signed by Melany Wilkes RN on 4/2/2020 at 11:10 AM

## 2020-04-03 LAB
ALBUMIN SERPL-MCNC: 2.8 G/DL (ref 3.4–5)
ALBUMIN SERPL-MCNC: 3 G/DL (ref 3.1–4.9)
ALPHA-1-GLOBULIN: 0.3 G/DL (ref 0.2–0.4)
ALPHA-2-GLOBULIN: 1.3 G/DL (ref 0.4–1.1)
ANION GAP SERPL CALCULATED.3IONS-SCNC: 18 MMOL/L (ref 3–16)
BETA GLOBULIN: 2.2 G/DL (ref 0.9–1.6)
BUN BLDV-MCNC: 72 MG/DL (ref 7–20)
CALCIUM SERPL-MCNC: 8.5 MG/DL (ref 8.3–10.6)
CHLORIDE BLD-SCNC: 96 MMOL/L (ref 99–110)
CO2: 25 MMOL/L (ref 21–32)
CREAT SERPL-MCNC: 8.5 MG/DL (ref 0.6–1.2)
GAMMA GLOBULIN: 0.8 G/DL (ref 0.6–1.8)
GFR AFRICAN AMERICAN: 6
GFR NON-AFRICAN AMERICAN: 5
GLUCOSE BLD-MCNC: 124 MG/DL (ref 70–99)
GLUCOSE BLD-MCNC: 164 MG/DL (ref 70–99)
HCT VFR BLD CALC: 25.2 % (ref 36–48)
HEMOGLOBIN: 8.4 G/DL (ref 12–16)
HEPATITIS B CORE TOTAL ANTIBODY: NEGATIVE
M SPIKE 1 PROTEIN ELECTROPHORESIS URINE/VOL: 0.24 G/DL
M SPIKE 1 SERUM PROTEIN ELEC: 1.3 G/DL
MCH RBC QN AUTO: 30.3 PG (ref 26–34)
MCHC RBC AUTO-ENTMCNC: 33.1 G/DL (ref 31–36)
MCV RBC AUTO: 91.4 FL (ref 80–100)
PDW BLD-RTO: 14.7 % (ref 12.4–15.4)
PERFORMED ON: ABNORMAL
PHOSPHORUS: 4.4 MG/DL (ref 2.5–4.9)
PLATELET # BLD: 153 K/UL (ref 135–450)
PMV BLD AUTO: 8.2 FL (ref 5–10.5)
POTASSIUM SERPL-SCNC: 3.9 MMOL/L (ref 3.5–5.1)
PROTEIN PROTEIN: 0.32 G/DL
PROTEIN PROTEIN: 318 MG/DL
RBC # BLD: 2.76 M/UL (ref 4–5.2)
SODIUM BLD-SCNC: 139 MMOL/L (ref 136–145)
SPE/IFE INTERPRETATION: NORMAL
TOTAL PROTEIN: 7.6 G/DL (ref 6.4–8.2)
URIC ACID, SERUM: 7.6 MG/DL (ref 2.6–6)
URINE ELECTROPHORESIS INTERP: NORMAL
VRE CULTURE: NORMAL
WBC # BLD: 11 K/UL (ref 4–11)

## 2020-04-03 PROCEDURE — 99232 SBSQ HOSP IP/OBS MODERATE 35: CPT | Performed by: INTERNAL MEDICINE

## 2020-04-03 PROCEDURE — 36592 COLLECT BLOOD FROM PICC: CPT

## 2020-04-03 PROCEDURE — 6370000000 HC RX 637 (ALT 250 FOR IP): Performed by: INTERNAL MEDICINE

## 2020-04-03 PROCEDURE — 80069 RENAL FUNCTION PANEL: CPT

## 2020-04-03 PROCEDURE — 36415 COLL VENOUS BLD VENIPUNCTURE: CPT

## 2020-04-03 PROCEDURE — 2060000000 HC ICU INTERMEDIATE R&B

## 2020-04-03 PROCEDURE — 84550 ASSAY OF BLOOD/URIC ACID: CPT

## 2020-04-03 PROCEDURE — 6360000002 HC RX W HCPCS: Performed by: INTERNAL MEDICINE

## 2020-04-03 PROCEDURE — 6360000002 HC RX W HCPCS: Performed by: STUDENT IN AN ORGANIZED HEALTH CARE EDUCATION/TRAINING PROGRAM

## 2020-04-03 PROCEDURE — 2580000003 HC RX 258: Performed by: STUDENT IN AN ORGANIZED HEALTH CARE EDUCATION/TRAINING PROGRAM

## 2020-04-03 PROCEDURE — 85027 COMPLETE CBC AUTOMATED: CPT

## 2020-04-03 PROCEDURE — 90935 HEMODIALYSIS ONE EVALUATION: CPT

## 2020-04-03 PROCEDURE — 6370000000 HC RX 637 (ALT 250 FOR IP): Performed by: STUDENT IN AN ORGANIZED HEALTH CARE EDUCATION/TRAINING PROGRAM

## 2020-04-03 PROCEDURE — 2580000003 HC RX 258: Performed by: INTERNAL MEDICINE

## 2020-04-03 RX ORDER — 0.9 % SODIUM CHLORIDE 0.9 %
500 INTRAVENOUS SOLUTION INTRAVENOUS ONCE
Status: COMPLETED | OUTPATIENT
Start: 2020-04-03 | End: 2020-04-03

## 2020-04-03 RX ADMIN — HEPARIN SODIUM 3200 UNITS: 1000 INJECTION INTRAVENOUS; SUBCUTANEOUS at 11:30

## 2020-04-03 RX ADMIN — ONDANSETRON 4 MG: 2 INJECTION INTRAMUSCULAR; INTRAVENOUS at 01:29

## 2020-04-03 RX ADMIN — SODIUM CHLORIDE 500 ML: 9 INJECTION, SOLUTION INTRAVENOUS at 13:10

## 2020-04-03 RX ADMIN — HEPARIN SODIUM 5000 UNITS: 5000 INJECTION INTRAVENOUS; SUBCUTANEOUS at 05:58

## 2020-04-03 RX ADMIN — HEPARIN SODIUM 5000 UNITS: 5000 INJECTION INTRAVENOUS; SUBCUTANEOUS at 21:34

## 2020-04-03 RX ADMIN — ATORVASTATIN CALCIUM 40 MG: 40 TABLET, FILM COATED ORAL at 21:33

## 2020-04-03 RX ADMIN — PROMETHAZINE HYDROCHLORIDE 12.5 MG: 12.5 TABLET ORAL at 20:04

## 2020-04-03 RX ADMIN — DEXAMETHASONE 40 MG: 4 TABLET ORAL at 15:53

## 2020-04-03 RX ADMIN — ACYCLOVIR 400 MG: 400 TABLET ORAL at 21:30

## 2020-04-03 RX ADMIN — Medication 10 ML: at 21:34

## 2020-04-03 RX ADMIN — ACYCLOVIR 400 MG: 400 TABLET ORAL at 12:16

## 2020-04-03 RX ADMIN — Medication 10 ML: at 12:17

## 2020-04-03 RX ADMIN — HEPARIN SODIUM 5000 UNITS: 5000 INJECTION INTRAVENOUS; SUBCUTANEOUS at 15:53

## 2020-04-03 RX ADMIN — METOPROLOL TARTRATE 25 MG: 25 TABLET, FILM COATED ORAL at 21:33

## 2020-04-03 ASSESSMENT — PAIN SCALES - GENERAL
PAINLEVEL_OUTOF10: 0

## 2020-04-03 NOTE — PROGRESS NOTES
Nephrology  Note                                                                                                                                                                                                                                                                                                                                                               Office : [phone]     Fax :[phone]              Patient's Name: Gabriel Patel    Doing better   GFR poor   TDC placed     Past Medical History:   Diagnosis Date    Allergic rhinitis     Anemia     CAD (coronary artery disease)     CAD (coronary artery disease)     Diverticulosis     GERD (gastroesophageal reflux disease)     History of colonoscopy     Hyperlipidemia     Hypertension     Myocardial infarction, inferior wall [facility]) 2006       Past Surgical History:   Procedure Laterality Date    BREAST REDUCTION SURGERY  Bilat     SECTION  x3    HYSTERECTOMY      TYMPANOPLASTY         Family History   Problem Relation Age of Onset    Heart Disease Mother     High Cholesterol Mother     Heart Disease Sister         hypertension    Diabetes Sister         hypertension    High Cholesterol Sister         hypertension    Heart Disease Brother     High Cholesterol Brother     Stroke Brother         hypertension        reports that she has been smoking. She has a 22.50 pack-year smoking history. She has never used smokeless tobacco. She reports that she does not drink alcohol or use drugs. Allergies:  Patient has no known allergies.     Current Medications:    heparin (porcine) injection 3,200 Units, PRN  acyclovir (ZOVIRAX) tablet 400 mg, BID  sodium bicarbonate 150 mEq in dextrose 5 % 1,000 mL infusion, Continuous  glucose (GLUTOSE) 40 % oral gel 15 g, PRN  dextrose 50 % IV solution, PRN  glucagon (rDNA) injection 1 mg, PRN  dextrose 5 % solution, PRN  dexamethasone (DECADRON) tablet 40 mg, Daily  bortezomib ALKPHOS 64     Troponin: No results for input(s): TROPONINI in the last 72 hours. BNP: No results for input(s): BNP in the last 72 hours. Lipids: No results for input(s): CHOL, TRIG, HDL, LDLCALC, LABVLDL in the last 72 hours. ABGs: No results for input(s): PHART, PO2ART, MHP5EMT in the last 72 hours. INR:   Recent Labs     04/01/20  1148   INR 1.13     UA:  No results for input(s): COLORU, CLARITYU, GLUCOSEU, BILIRUBINUR, KETUA, SPECGRAV, BLOODU, PHUR, PROTEINU, UROBILINOGEN, NITRU, LEUKOCYTESUR, Sherri Candy in the last 72 hours. Urine Microscopic:   No results for input(s): LABCAST, BACTERIA, COMU, HYALCAST, WBCUA, RBCUA, EPIU in the last 72 hours. Urine Culture:   No results for input(s): LABURIN in the last 72 hours. Urine Chemistry:   Recent Labs     03/31/20  0855   PROTEINUR 318.00*  0.318             IMAGING:  IR TUNNELED CVC PLACE WO SQ PORT/PUMP > 5 YEARS   Final Result   IMPRESSION :      Limited ultrasound examination demonstrates a patent right internal jugular vein. Placement of tunnelled right jugular dialysis catheter. Fluoroscopy time : 0.3   Number of exposures obtained : 1   Moderate sedation was provided and monitored by an independent trained observer. Moderate sedation start time : 1004   Moderate sedation end time : 1017   Blood loss : minimal (less than 5 cc)            XR Bone Survey Complete   Final Result      Bone survey films are compared to 7/25/2017. There are several small lytic lesions in the skull measuring up to 5 mm. This is progressive since the prior study. Moderate degenerative changes in the lower cervical spine. Severe degenerative changes in the thumbs bilaterally. Old traumatic or postoperative changes in the right acromial clavicular joint. Moderate bilateral hip arthritis. Bilateral femoral artery    calcification. Severe degenerative changes in the lower lumbar spine. Moderate lower thoracic degenerative changes.       CT ABDOMEN PELVIS WO CONTRAST Additional Contrast? None   Final Result       The gallbladder is again distended with areas of peripheral wall    calcification consistent with history of porcelain gallbladder. No    surrounding inflammatory change. Diverticulosis without diverticulitis. No bowel dilation or free air. Abdominal aortic aneurysm just above the bifurcation and involving the    right common iliac artery again noted measuring up to 3 cm. No free fluid. There are now multifocal lytic lesions involving the right and left iliac    bones, right L3 transverse process, thoracic and lumbar vertebral bodies    and left ninth rib may represent metastatic disease or multiple myeloma,    correlate with history. Assessment/Plan   1. WANDER     2. Likely MM     3. Anemia    4. Acid- base/ Electrolyte imbalance     5.  Hypercalcemia     Plan   - Pt has severe WANDER   - NEED TO START RRT   - HD cath placed   - UO is better   - BM bx   - Pamidronate x 1  for Hypercalcemia   - replce Bicarb   - start Chemo today   - Monitor UO and renal function   - labs in am                      Thank you for allowing us to participate in care of [address] free to contact me   [facility] [address]  Office : [phone]  Fax :[phone]

## 2020-04-03 NOTE — FLOWSHEET NOTE
Treatment time: 3 hours  Net UF: 2000 ml     Pre weight: 63.2 kg  Post weight:61 kg  EDW: TBD kg      04/03/20 0730 04/03/20 1145   Vital Signs   /60 115/61   Temp 98.1 °F (36.7 °C) 97.8 °F (36.6 °C)   Pulse 52 (!) 47   Resp 16 13   SpO2 93 % 95 %   Height 5' 4\" (1.626 m) 5' 4\" (1.626 m)   Weight 139 lb 6 oz (63.2 kg) 134 lb 8 oz (61 kg)   Weight Method Standing scale Standing scale   Percent Weight Change 1.97 -3.5     Access used: R TDC    Access function: Well with  ml/min     Medications or blood products given: n/a     Regular outpatient schedule: TBD WANDER      Summary of response to treatment: Patient tolerated treatment well and without any complications. Patient placed on 2L nasal cannula due to oxygen saturation of 85-91% prior to start of treatment. Report given to Heather Cavazos RN and copy of dialysis treatment record placed in chart, to be scanned into EMR.

## 2020-04-03 NOTE — CARE COORDINATION
Case Management Assessment           Daily Note                 Date/ Time of Note: 4/3/2020 2:57 PM         Note completed by: Cameron Gomez    Patient Name: Travis Krishna  YOB: 1950    Diagnosis:Acute kidney injury [facility]) [N17.9]  Acute kidney injury [facility]) [N17.9]  Patient Admission Status: Inpatient    Date of Admission:3/30/2020  8:58 PM Length of Stay: 4 GLOS:        Current Plan of Care: home with no needs  ________________________________________________________________________________________  PT AM-PAC:   / 24 per last evaluation on: none yet    OT AM-PAC:   / 24 per last evaluation on: none yet    DME Needs for discharge: none at this time    ________________________________________________________________________________________  Discharge Plan: Home    Tentative discharge date: tbd    Current barriers to discharge: medical clearance    Referrals completed: Dialysis:  [facility]    Resources/ information provided: Not indicated at this time  ________________________________________________________________________________________  Case Management Notes: Met with patient at bedside. She reports that she lives at home with her  in a three-family house with 6 steps to enter. She has three children and nine grandchildren. She reports that she has a lot of support from family, friends, and neighbors, so she does not anticipate needing assistance getting to appointments. She also reports that she is independent at baseline. SW to continue to monitor for discharge planning needs. [facility] appears to be where her dialysis placement will be. Madina Gary and her family were provided with choice of provider; she and her family are in agreement with the discharge plan.     Care Transition Patient: Yes    Cameron Gomez [facility]  Case Management Department  Ph: [phone]  Fax: [phone]

## 2020-04-04 LAB
ALBUMIN SERPL-MCNC: 3 G/DL (ref 3.4–5)
ANION GAP SERPL CALCULATED.3IONS-SCNC: 15 MMOL/L (ref 3–16)
BUN BLDV-MCNC: 48 MG/DL (ref 7–20)
CALCIUM SERPL-MCNC: 7.7 MG/DL (ref 8.3–10.6)
CHLORIDE BLD-SCNC: 98 MMOL/L (ref 99–110)
CO2: 23 MMOL/L (ref 21–32)
CREAT SERPL-MCNC: 6 MG/DL (ref 0.6–1.2)
GFR AFRICAN AMERICAN: 8
GFR NON-AFRICAN AMERICAN: 7
GLUCOSE BLD-MCNC: 139 MG/DL (ref 70–99)
GLUCOSE BLD-MCNC: 144 MG/DL (ref 70–99)
HCT VFR BLD CALC: 26.7 % (ref 36–48)
HEMOGLOBIN: 8.9 G/DL (ref 12–16)
MCH RBC QN AUTO: 29.6 PG (ref 26–34)
MCHC RBC AUTO-ENTMCNC: 33.5 G/DL (ref 31–36)
MCV RBC AUTO: 88.4 FL (ref 80–100)
PDW BLD-RTO: 14.7 % (ref 12.4–15.4)
PERFORMED ON: ABNORMAL
PHOSPHORUS: 4.9 MG/DL (ref 2.5–4.9)
PLATELET # BLD: 139 K/UL (ref 135–450)
PMV BLD AUTO: 9.1 FL (ref 5–10.5)
POTASSIUM SERPL-SCNC: 4.6 MMOL/L (ref 3.5–5.1)
RBC # BLD: 3.02 M/UL (ref 4–5.2)
SODIUM BLD-SCNC: 136 MMOL/L (ref 136–145)
WBC # BLD: 9.6 K/UL (ref 4–11)

## 2020-04-04 PROCEDURE — 2580000003 HC RX 258: Performed by: INTERNAL MEDICINE

## 2020-04-04 PROCEDURE — 96401 CHEMO ANTI-NEOPL SQ/IM: CPT

## 2020-04-04 PROCEDURE — 6370000000 HC RX 637 (ALT 250 FOR IP): Performed by: STUDENT IN AN ORGANIZED HEALTH CARE EDUCATION/TRAINING PROGRAM

## 2020-04-04 PROCEDURE — 99232 SBSQ HOSP IP/OBS MODERATE 35: CPT | Performed by: INTERNAL MEDICINE

## 2020-04-04 PROCEDURE — 6370000000 HC RX 637 (ALT 250 FOR IP): Performed by: INTERNAL MEDICINE

## 2020-04-04 PROCEDURE — 6360000002 HC RX W HCPCS: Performed by: INTERNAL MEDICINE

## 2020-04-04 PROCEDURE — 80069 RENAL FUNCTION PANEL: CPT

## 2020-04-04 PROCEDURE — 85027 COMPLETE CBC AUTOMATED: CPT

## 2020-04-04 PROCEDURE — 2580000003 HC RX 258: Performed by: STUDENT IN AN ORGANIZED HEALTH CARE EDUCATION/TRAINING PROGRAM

## 2020-04-04 PROCEDURE — 2060000000 HC ICU INTERMEDIATE R&B

## 2020-04-04 PROCEDURE — 6360000002 HC RX W HCPCS: Performed by: STUDENT IN AN ORGANIZED HEALTH CARE EDUCATION/TRAINING PROGRAM

## 2020-04-04 RX ORDER — PROMETHAZINE HYDROCHLORIDE 25 MG/1
12.5 TABLET ORAL EVERY 6 HOURS PRN
Status: DISCONTINUED | OUTPATIENT
Start: 2020-04-04 | End: 2020-04-07 | Stop reason: HOSPADM

## 2020-04-04 RX ADMIN — HEPARIN SODIUM 5000 UNITS: 5000 INJECTION INTRAVENOUS; SUBCUTANEOUS at 21:30

## 2020-04-04 RX ADMIN — HEPARIN SODIUM 5000 UNITS: 5000 INJECTION INTRAVENOUS; SUBCUTANEOUS at 14:20

## 2020-04-04 RX ADMIN — METOPROLOL TARTRATE 25 MG: 25 TABLET, FILM COATED ORAL at 20:13

## 2020-04-04 RX ADMIN — HEPARIN SODIUM 5000 UNITS: 5000 INJECTION INTRAVENOUS; SUBCUTANEOUS at 06:30

## 2020-04-04 RX ADMIN — PROMETHAZINE HYDROCHLORIDE 12.5 MG: 12.5 TABLET ORAL at 08:18

## 2020-04-04 RX ADMIN — ATORVASTATIN CALCIUM 40 MG: 40 TABLET, FILM COATED ORAL at 20:13

## 2020-04-04 RX ADMIN — ACYCLOVIR 400 MG: 400 TABLET ORAL at 20:30

## 2020-04-04 RX ADMIN — BORTEZOMIB 2.1 MG: 3.5 INJECTION, POWDER, LYOPHILIZED, FOR SOLUTION INTRAVENOUS; SUBCUTANEOUS at 15:48

## 2020-04-04 RX ADMIN — PROMETHAZINE HYDROCHLORIDE 12.5 MG: 12.5 TABLET ORAL at 20:12

## 2020-04-04 RX ADMIN — DEXAMETHASONE 40 MG: 4 TABLET ORAL at 15:46

## 2020-04-04 RX ADMIN — PROMETHAZINE HYDROCHLORIDE 12.5 MG: 12.5 TABLET ORAL at 14:20

## 2020-04-04 RX ADMIN — Medication 10 ML: at 08:18

## 2020-04-04 RX ADMIN — ONDANSETRON 4 MG: 2 INJECTION INTRAMUSCULAR; INTRAVENOUS at 11:37

## 2020-04-04 RX ADMIN — Medication 10 ML: at 20:13

## 2020-04-04 RX ADMIN — METOPROLOL TARTRATE 25 MG: 25 TABLET, FILM COATED ORAL at 08:18

## 2020-04-04 RX ADMIN — ACYCLOVIR 400 MG: 400 TABLET ORAL at 08:18

## 2020-04-04 ASSESSMENT — PAIN SCALES - GENERAL
PAINLEVEL_OUTOF10: 0

## 2020-04-04 NOTE — PROGRESS NOTES
[facility] Progress Note    2020     Bc Hassan    MRN: [id number]    : 1950    Referring MD: Arslan Patel MD  [address], [facility] [address]    SUBJECTIVE:  Patient is doing welI. ECOG PS:  (1) Restricted in physically strenuous activity, ambulatory and able to do work of light nature    KPS: 80% Normal activity with effort; some signs or symptoms of disease    Isolation: None    Medications    Scheduled Meds:   metoprolol tartrate  25 mg Oral BID    acyclovir  400 mg Oral BID    dexamethasone  40 mg Oral Daily    bortezomib (VELCADE) 2.5 mg/mL chemo subcutaneous syringe  2.1 mg Subcutaneous Once per day on Wed Sat    [START ON 2020] dexamethasone  10 mg Oral Once per day on Wed Sat    amLODIPine  5 mg Oral Daily    atorvastatin  40 mg Oral Nightly    sodium chloride flush  10 mL Intravenous 2 times per day    heparin (porcine)  5,000 Units Subcutaneous 3 times per day     Continuous Infusions:   dextrose       PRN Meds:.promethazine, heparin (porcine), glucose, dextrose, glucagon (rDNA), dextrose, sodium chloride flush, acetaminophen **OR** acetaminophen, polyethylene glycol, [DISCONTINUED] promethazine **OR** ondansetron    ROS:  As noted above, otherwise remainder of 10-point ROS negative    Physical Exam:     I&O:      Intake/Output Summary (Last 24 hours) at 2020 1230  Last data filed at 2020 0911  Gross per 24 hour   Intake 240 ml   Output --   Net 240 ml       Vital Signs:  /70   Pulse 57   Temp 98.3 °F (36.8 °C) (Oral)   Resp 15   Ht 5' 4\" (1.626 m)   Wt 132 lb 12.8 oz (60.2 kg)   SpO2 97%   BMI 22.80 kg/m²     Weight:    Wt Readings from Last 3 Encounters:   20 132 lb 12.8 oz (60.2 kg)   20 127 lb (57.6 kg)   19 150 lb (68 kg)     General: Awake, alert and oriented.   HEENT: normocephalic, PERRL, no scleral erythema or icterus, Oral mucosa moist and intact, throat clear  NECK: supple without palpable

## 2020-04-04 NOTE — PROGRESS NOTES
distended with areas of peripheral wall    calcification consistent with history of porcelain gallbladder. No    surrounding inflammatory change. Diverticulosis without diverticulitis. No bowel dilation or free air. Abdominal aortic aneurysm just above the bifurcation and involving the    right common iliac artery again noted measuring up to 3 cm. No free fluid. There are now multifocal lytic lesions involving the right and left iliac    bones, right L3 transverse process, thoracic and lumbar vertebral bodies    and left ninth rib may represent metastatic disease or multiple myeloma,    correlate with history. Assessment/Plan   1. WANDER     2. Likely MM     3. Anemia    4. Acid- base/ Electrolyte imbalance     5.  Hypercalcemia     Plan   - Pt has severe WANDER   - on RRT - next HD Monday   - HD unit placement - [facility]   - HD cath placed   - UO is better   - BM bx - reviewed   - Pamidronate x 1  for Hypercalcemia   - on chemo   - Monitor UO and renal function   - labs in am                      Thank you for allowing us to participate in care of [address] free to contact me   [facility] [address]  Office : [phone]  Fax :[phone]

## 2020-04-05 LAB
ALBUMIN SERPL-MCNC: 3.1 G/DL (ref 3.4–5)
ANION GAP SERPL CALCULATED.3IONS-SCNC: 17 MMOL/L (ref 3–16)
BUN BLDV-MCNC: 77 MG/DL (ref 7–20)
CALCIUM SERPL-MCNC: 7.4 MG/DL (ref 8.3–10.6)
CHLORIDE BLD-SCNC: 96 MMOL/L (ref 99–110)
CO2: 22 MMOL/L (ref 21–32)
CREAT SERPL-MCNC: 8.2 MG/DL (ref 0.6–1.2)
GFR AFRICAN AMERICAN: 6
GFR NON-AFRICAN AMERICAN: 5
GLUCOSE BLD-MCNC: 141 MG/DL (ref 70–99)
HCT VFR BLD CALC: 26.8 % (ref 36–48)
HEMOGLOBIN: 9.1 G/DL (ref 12–16)
MCH RBC QN AUTO: 30.1 PG (ref 26–34)
MCHC RBC AUTO-ENTMCNC: 34.1 G/DL (ref 31–36)
MCV RBC AUTO: 88.2 FL (ref 80–100)
PDW BLD-RTO: 14.4 % (ref 12.4–15.4)
PHOSPHORUS: 6.1 MG/DL (ref 2.5–4.9)
PLATELET # BLD: 135 K/UL (ref 135–450)
PMV BLD AUTO: 9.2 FL (ref 5–10.5)
POTASSIUM SERPL-SCNC: 4.4 MMOL/L (ref 3.5–5.1)
RBC # BLD: 3.03 M/UL (ref 4–5.2)
SODIUM BLD-SCNC: 135 MMOL/L (ref 136–145)
WBC # BLD: 10.2 K/UL (ref 4–11)

## 2020-04-05 PROCEDURE — 2060000000 HC ICU INTERMEDIATE R&B

## 2020-04-05 PROCEDURE — 97110 THERAPEUTIC EXERCISES: CPT

## 2020-04-05 PROCEDURE — 85027 COMPLETE CBC AUTOMATED: CPT

## 2020-04-05 PROCEDURE — 6370000000 HC RX 637 (ALT 250 FOR IP): Performed by: STUDENT IN AN ORGANIZED HEALTH CARE EDUCATION/TRAINING PROGRAM

## 2020-04-05 PROCEDURE — 6360000002 HC RX W HCPCS: Performed by: STUDENT IN AN ORGANIZED HEALTH CARE EDUCATION/TRAINING PROGRAM

## 2020-04-05 PROCEDURE — 97161 PT EVAL LOW COMPLEX 20 MIN: CPT

## 2020-04-05 PROCEDURE — 2580000003 HC RX 258: Performed by: STUDENT IN AN ORGANIZED HEALTH CARE EDUCATION/TRAINING PROGRAM

## 2020-04-05 PROCEDURE — 97165 OT EVAL LOW COMPLEX 30 MIN: CPT

## 2020-04-05 PROCEDURE — 99232 SBSQ HOSP IP/OBS MODERATE 35: CPT | Performed by: INTERNAL MEDICINE

## 2020-04-05 PROCEDURE — 97535 SELF CARE MNGMENT TRAINING: CPT

## 2020-04-05 PROCEDURE — 80069 RENAL FUNCTION PANEL: CPT

## 2020-04-05 PROCEDURE — 6370000000 HC RX 637 (ALT 250 FOR IP): Performed by: INTERNAL MEDICINE

## 2020-04-05 RX ADMIN — METOPROLOL TARTRATE 25 MG: 25 TABLET, FILM COATED ORAL at 08:06

## 2020-04-05 RX ADMIN — Medication 10 ML: at 08:07

## 2020-04-05 RX ADMIN — ACYCLOVIR 400 MG: 400 TABLET ORAL at 08:07

## 2020-04-05 RX ADMIN — HEPARIN SODIUM 5000 UNITS: 5000 INJECTION INTRAVENOUS; SUBCUTANEOUS at 23:33

## 2020-04-05 RX ADMIN — ONDANSETRON 4 MG: 2 INJECTION INTRAMUSCULAR; INTRAVENOUS at 08:15

## 2020-04-05 RX ADMIN — ACYCLOVIR 400 MG: 400 TABLET ORAL at 21:21

## 2020-04-05 RX ADMIN — HEPARIN SODIUM 5000 UNITS: 5000 INJECTION INTRAVENOUS; SUBCUTANEOUS at 06:22

## 2020-04-05 RX ADMIN — PROMETHAZINE HYDROCHLORIDE 12.5 MG: 12.5 TABLET ORAL at 04:02

## 2020-04-05 RX ADMIN — METOPROLOL TARTRATE 25 MG: 25 TABLET, FILM COATED ORAL at 21:21

## 2020-04-05 RX ADMIN — HEPARIN SODIUM 5000 UNITS: 5000 INJECTION INTRAVENOUS; SUBCUTANEOUS at 14:09

## 2020-04-05 RX ADMIN — ATORVASTATIN CALCIUM 40 MG: 40 TABLET, FILM COATED ORAL at 21:21

## 2020-04-05 RX ADMIN — ONDANSETRON 4 MG: 2 INJECTION INTRAMUSCULAR; INTRAVENOUS at 14:12

## 2020-04-05 ASSESSMENT — PAIN SCALES - GENERAL
PAINLEVEL_OUTOF10: 0

## 2020-04-05 NOTE — PLAN OF CARE
Increase independence with functional transfers and ADLs.
Problem: Falls - Risk of:  Goal: Will remain free from falls  Description: Will remain free from falls  4/1/2020 1226 by Ree Soto RN  Outcome: Ongoing  Note: Patient is a high fall risk, fall precautions in place, patient remains free from falls.      Problem: Pain:  Goal: Pain level will decrease  Description: Pain level will decrease  4/1/2020 1226 by Ree Soto RN  Note: Patient denies paint at this time
Problem: Falls - Risk of:  Goal: Will remain free from falls  Description: Will remain free from falls  4/2/2020 0318 by Tatiana Kaufman RN  Note:   + Screening for Orthostasis and/or + High Fall Risk per VILLAGRAN/ABCDS: Explained fall risk precautions to pt and family and rationale behind their use to keep the patient safe. Pt bed is in low position, side rails up, call light and belongings are in reach. Fall wristband applied and present on pts wrist.  Bed alarm on. Pt encouraged to call for assistance. Will continue with hourly rounds for PO intake, pain needs, toileting and repositioning as needed. Problem: Pain:  Goal: Pain level will decrease  Description: Pain level will decrease  4/2/2020 0318 by Tatiana Kaufman RN  Note: Fili Quiñonez remained free from pain this shift. Will continue to monitor. Problem: Bleeding:  Goal: Will show no signs and symptoms of excessive bleeding  Description: Will show no signs and symptoms of excessive bleeding  Note: Patient's hemoglobin this AM:   Patient's hemoglobin this AM:   Recent Labs     04/02/20  0403   HGB 8.7*     Patient's platelet count this AM:   Recent Labs     04/02/20  0403       Thrombocytopenia Precautions in place. Patient showing no signs or symptoms of active bleeding. Transfusion not indicated at this time. Patient verbalizes understanding of all instructions. Will continue to assess and implement POC. Call light within reach and hourly rounding in place. Problem: Infection - Central Venous Catheter-Associated Bloodstream Infection:  Goal: Will show no infection signs and symptoms  Description: Will show no infection signs and symptoms  Note: Pt afebrile throughout shift. VSS. No sign of redness, tenderness, or drainage at line site. Will continue to monitor.
Problem: Falls - Risk of:  Goal: Will remain free from falls  Description: Will remain free from falls  Note:   + Screening for Orthostasis and/or + High Fall Risk per VILLAGRAN/ABCDS: Explained fall risk precautions to pt and family and rationale behind their use to keep the patient safe. Pt bed is in low position, side rails up, call light and belongings are in reach. Fall wristband applied and present on pts wrist.  Bed alarm on. Pt encouraged to call for assistance. Will continue with hourly rounds for PO intake, pain needs, toileting and repositioning as needed. Problem: Pain:  Goal: Pain level will decrease  Description: Pain level will decrease    Note: Madina Gary remained free from pain this shift. Will continue to monitor. Problem: Bleeding:  Goal: Will show no signs and symptoms of excessive bleeding  Description: Will show no signs and symptoms of excessive bleeding  Note: Patient's hemoglobin this AM:   Patient's hemoglobin this AM:   Recent Labs     04/03/20  0400   HGB 8.4*     Patient's platelet count this AM:   Recent Labs     04/03/20  0400       Thrombocytopenia Precautions in place. Patient showing no signs or symptoms of active bleeding. Transfusion not indicated at this time. Patient verbalizes understanding of all instructions. Will continue to assess and implement POC. Call light within reach and hourly rounding in place. Problem: Infection - Central Venous Catheter-Associated Bloodstream Infection:  Goal: Will show no infection signs and symptoms  Description: Will show no infection signs and symptoms  Note: Pt afebrile throughout shift. VSS. No sign of redness, tenderness, or drainage at line site. Will continue to monitor.
Problem: Falls - Risk of:  Goal: Will remain free from falls  Description: Will remain free from falls  Note: She is a fall risk. Armband in place, door open, and bed alarm on. Call light in reach will monitor. Problem: Fluid Volume:  Goal: Signs and symptoms of dehydration will decrease  Description: Signs and symptoms of dehydration will decrease  Note: , CR 10.8. Urine sent for protein electophoresis. She is voiding clear yellow urine. IVF at 250/hr. Will monitor. Goal: Ability to achieve a balanced intake and output will improve  Description: Ability to achieve a balanced intake and output will improve  Note: Output < intake. Will monitor.
warmth noted at site. Dressing changes continue per protocol and on an as needed basis - see flowsheet.      Problem: Discharge Planning:  Goal: Patients continuum of care needs are met  Description: Patients continuum of care needs are met  Outcome: Ongoing  Note: Will continue to update Yaya Meres on any changes to POC

## 2020-04-05 NOTE — PROGRESS NOTES
transfusion today      4. Metabolic: hypoNa + hyerpglycemia due to steroids + hypoCa + hyperPhos + elevated creat     Acute kidney injury - improved   Electrolytes abnormalities with AG metabolic acidosis- improved . Admitted with WANDER d/t MM, will need HD, fair UOP  - Cont D5 + 150 mEq NaHCO3 @ 75 cc/hr  -Consult Nephrology, appreciate recs (Aubrey Hong)  -s/p   tunneled HD 4/2   And initiation   HD   - Replace Potassium and Magnesium per protocol.   -s/p Calcitonin 200 mg x 1 and pamidoronate 60 mg IV x 1 (3/31/20)- corrected calcium improved   On hemodialysis      5. Nutrition:     - Cont low microbial diet  - Dietary to follow closely      6. Hypoglycemia- improved :  -Monitor Q 12 hours x 24 hours    - DVT Prophylaxis: Platelets >27,130 cells/dL, - daily heparin  prophylaxis ordered  Contraindications to pharmacologic prophylaxis: None  Contraindications to mechanical prophylaxis: None    - Disposition: possibly early next wk, will need to make arrangements to cont syst chemotx and possibly HD before d/c.      Adalid David MD

## 2020-04-05 NOTE — PROGRESS NOTES
PRN  bortezomib (VELCADE) 2.1 mg in sodium chloride (PF) 0.84 mL chemo subcutaneous syringe, Once per day on Wed Sat  [START ON 4/8/2020] dexamethasone (DECADRON) tablet 10 mg, Once per day on Wed Sat  amLODIPine (NORVASC) tablet 5 mg, Daily  atorvastatin (LIPITOR) tablet 40 mg, Nightly  sodium chloride flush 0.9 % injection 10 mL, 2 times per day  sodium chloride flush 0.9 % injection 10 mL, PRN  acetaminophen (TYLENOL) tablet 650 mg, Q6H PRN    Or  acetaminophen (TYLENOL) suppository 650 mg, Q6H PRN  polyethylene glycol (GLYCOLAX) packet 17 g, Daily PRN  ondansetron (ZOFRAN) injection 4 mg, Q6H PRN  heparin (porcine) injection 5,000 Units, 3 times per day            Physical exam:     Vitals:  /68   Pulse 60   Temp 98.6 °F (37 °C) (Oral)   Resp 16   Ht 5' 4\" (1.626 m)   Wt 132 lb 12.8 oz (60.2 kg)   SpO2 97%   BMI 22.80 kg/m²   Constitutional:  OAA X3 NAD  Skin: no rash, turgor wnl  Heent:  eomi, mmm  Neck: no bruits or jvd noted  Cardiovascular:  S1, S2 without m/r/g  Respiratory: CTA B without w/r/r  Abdomen:  +bs, soft, nt, nd  Ext: + lower extremity edema  Psychiatric: mood and affect appropriate  Musculoskeletal:  Rom, muscular strength intact    Data:   Labs:  CBC:   Recent Labs     04/03/20 0400 04/04/20 0411 04/05/20 0418   WBC 11.0 9.6 10.2   HGB 8.4* 8.9* 9.1*    139 135     BMP:    Recent Labs     04/03/20 0400 04/04/20 0411 04/05/20 0418    136 135*   K 3.9 4.6 4.4   CL 96* 98* 96*   CO2 25 23 22   BUN 72* 48* 77*   CREATININE 8.5* 6.0* 8.2*   GLUCOSE 164* 144* 141*     Ca/Mg/Phos:   Recent Labs     04/03/20  0400 04/04/20  0411 04/05/20  0418   CALCIUM 8.5 7.7* 7.4*   PHOS 4.4 4.9 6.1*     Hepatic:   No results for input(s): AST, ALT, ALB, BILITOT, ALKPHOS in the last 72 hours. Troponin: No results for input(s): TROPONINI in the last 72 hours. BNP: No results for input(s): BNP in the last 72 hours.   Lipids: No results for input(s): CHOL, TRIG, HDL, LDLCALC, LABVLDL in

## 2020-04-05 NOTE — PROGRESS NOTES
Patient Diagnosis(es): The primary encounter diagnosis was Acute renal failure, unspecified acute renal failure type ([address] Diagnoses of Hypercalcemia and Hyperphosphatemia were also pertinent to this visit. has a past medical history of Allergic rhinitis, Anemia, CAD (coronary artery disease), CAD (coronary artery disease), Diverticulosis, GERD (gastroesophageal reflux disease), History of colonoscopy, Hyperlipidemia, Hypertension, and Myocardial infarction, inferior wall ([address] has a past surgical history that includes  section (x3); Hysterectomy; Tympanoplasty; and Breast reduction surgery ([place]). Treatment Diagnosis: Impaired ADL tasks/transfers, decreased endurance       Restrictions  Position Activity Restriction  Other position/activity restrictions: up as tolerated    Subjective   General  Patient assessed for rehabilitation services?: Yes  Additional Pertinent Hx: 66 yo F h/o hypertension, porcelain gallbladder and CAD presnting with with one week history of nausea and few episodes of vomiting and diarrhea; CT abdomen/pelvis was performed today which showed known porcelain gallbladder and new multifocal lytic lesions. ; Newly diagnosed Multiple Myeloma:  Referring Practitioner: Lavonne   Diagnosis: WANDER  Subjective  Subjective: Patient resting in supine upon arrival. Pleasant and agreeable to therapy evaluation. Patient Currently in Pain: Denies  Vital Signs  Patient Currently in Pain: Denies  Height and Weight  Weight: 133 lb 9.6 oz (60.6 kg)  Weight Method: Bedside scale; Actual  BMI (Calculated): 23  Social/Functional History  Social/Functional History  Lives With: Spouse  Type of Home: House  Home Layout: Multi-level  Home Access: Stairs to enter with rails  Entrance Stairs - Number of Steps: 6-7 JOSE A  Bathroom Shower/Tub: Walk-in shower  Bathroom Toilet: Standard  Bathroom Equipment: Hand-held shower, Shower chair  Home Equipment: Standard walker  ADL Assistance: Independent  Homemaking Assistance: Independent  Homemaking Responsibilities: Yes  Ambulation Assistance: Independent  Transfer Assistance: Independent  Active : Yes  Occupation: Retired  Additional Comments:  works during the day; Reports no falls recently        Objective   Vision: Impaired  Vision Exceptions: Wears glasses for distance  Hearing: Within functional limits          Balance  Sitting Balance: Stand by assistance  Standing Balance: Minimal assistance  Standing Balance  Time: 5 minutes total  Activity: Ambulating from bed to chair, from chair to bathroom   Comment: CGA with Min A needed x2 during ambulation due to minimal LOB while turning, reaching for support from furniture and wall - walker recommended at home   Toilet Transfers  Toilet - Technique: Ambulating  Equipment Used: Standard toilet(+ Grab bar on R)  Toilet Transfer: Contact guard assistance  ADL  Feeding: Independent  Grooming: Contact guard assistance(hand hygiene in bathroom)  Tone RUE  RUE Tone: Normotonic  Tone LUE  LUE Tone: Normotonic  Coordination  Movements Are Fluid And Coordinated: Yes     Bed mobility  Supine to Sit: Stand by assistance(HOB Elevated )  Transfers  Sit to stand: Contact guard assistance;Stand by assistance  Stand to sit: Contact guard assistance;Stand by assistance  Transfer Comments: CGA progressing to SBA throughout, cueing for hand placement during sit<>stand transfers for safety     Cognition  Overall Cognitive Status: WNL        Sensation  Overall Sensation Status: WFL(denies numbness/tingling to all BLE dermatomes)  Type of ROM/Therapeutic Exercise  Type of ROM/Therapeutic Exercise: AROM  Comment: BUEs  Exercises  Shoulder Flexion: x10  Shoulder Extension: x10  Chair Push-ups: x4  Elbow Flexion: x10  Elbow Extension: x10  Grasp/Release: x10                            Plan   Plan  Times per week: 2-5  Times per day: Daily  Current Treatment Recommendations: Strengthening, Endurance Training,

## 2020-04-05 NOTE — PROGRESS NOTES
Physical Therapy    Facility/Department: [address]  Initial Assessment    NAME: Abdiel Hyatt  : 1950  MRN: [id number]    Date of Service: 2020    Discharge Recommendations:  Abdiel Hyatt scored a 18/24 on the AM-PAC short mobility form. Current research shows that an AM-PAC score of 18 or greater is typically associated with a discharge to the patient's home setting. Based on the patients AM-PAC score and their current functional mobility deficits, it is recommended that the patient have 2-3 sessions per week of Physical Therapy at d/c to increase the patients independence. If patient discharges prior to next session this note will serve as a discharge summary. Please see below for the latest assessment towards goals. HOME HEALTH CARE: LEVEL 1 STANDARD    - Initial home health evaluation to occur within 24-48 hours, in patient home   - Therapy to evaluate with goal of regaining prior level of functioning   - Therapy to evaluate if patient has [address] needs for personal care        PT Equipment Recommendations  Equipment Needed: No    Assessment   Body structures, Functions, Activity limitations: Decreased functional mobility ; Decreased safe awareness;Decreased endurance;Decreased balance  Assessment: Pt is a 65 y.o. female with diagnosis of WANDER presenting with decreased functional mobility. Pt is currently requiring CGA for OOB mobility, which is a decline from reported baseline. Pt will benefit from continued therapy while in hospital for increased independence to return home. PT recommends initial 24 hr supervision/assist with HHPT and use of RW for increased safety at d/c.   Treatment Diagnosis: decreased functional mobility due to WANDER  Prognosis: Good  Decision Making: Low Complexity  Patient Education: Role of PT, goals, d/c recommendations, pt verbalized understanding  REQUIRES PT FOLLOW UP: Yes  Activity Tolerance  Activity Tolerance: Patient Tolerated recliner with CGA. Plan   Plan  Times per week: 2-5  Times per day: Daily  Current Treatment Recommendations: Strengthening, Transfer Training, Endurance Training, Neuromuscular Re-education, Patient/Caregiver Education & Training, ROM, Equipment Evaluation, Education, & procurement, Balance Training, Gait Training, Home Exercise Program, Functional Mobility Training, Stair training, Safety Education & Training  Safety Devices  Type of devices: Call light within reach, Chair alarm in place, Gait belt, Left in chair, Nurse notified    G-Code       OutComes Score                                                  AM-PAC Score  AM-PAC Inpatient Mobility Raw Score : 18 (04/05/20 1046)  AM-PAC Inpatient T-Scale Score : 43.63 (04/05/20 1046)  Mobility Inpatient CMS 0-100% Score: 46.58 (04/05/20 1046)  Mobility Inpatient CMS G-Code Modifier : CK (04/05/20 1046)          Goals  Short term goals  Time Frame for Short term goals: Discharge  Short term goal 1: Pt will perform all bed mobility with Sunni  Short term goal 2: Pt will perform sit to/from stand with or without RW and supervision  Short term goal 3: Pt will ambulate 100' with or without RW and supervision  Short term goal 4: Pt will ascend/descend 7 stairs with unilateral HR and SBA  Patient Goals   Patient goals : \"To get out of here as fast as I can. \"       Therapy Time   Individual Concurrent Group Co-treatment   Time In 0840         Time Out 0904         Minutes 24              Timed Code Treatment Minutes:   9    Total Treatment Minutes:  Kam PT, DPT, CLT    This note to serve as discharge summary if patient discharged before next session.

## 2020-04-05 NOTE — PROGRESS NOTES
Final Result   IMPRESSION :      Limited ultrasound examination demonstrates a patent right internal jugular vein. Placement of tunnelled right jugular dialysis catheter. Fluoroscopy time : 0.3   Number of exposures obtained : 1   Moderate sedation was provided and monitored by an independent trained observer. Moderate sedation start time : 1004   Moderate sedation end time : 1017   Blood loss : minimal (less than 5 cc)            XR Bone Survey Complete   Final Result      Bone survey films are compared to 7/25/2017. There are several small lytic lesions in the skull measuring up to 5 mm. This is progressive since the prior study. Moderate degenerative changes in the lower cervical spine. Severe degenerative changes in the thumbs bilaterally. Old traumatic or postoperative changes in the right acromial clavicular joint. Moderate bilateral hip arthritis. Bilateral femoral artery    calcification. Severe degenerative changes in the lower lumbar spine. Moderate lower thoracic degenerative changes. CT ABDOMEN PELVIS WO CONTRAST Additional Contrast? None   Final Result       The gallbladder is again distended with areas of peripheral wall    calcification consistent with history of porcelain gallbladder. No    surrounding inflammatory change. Diverticulosis without diverticulitis. No bowel dilation or free air. Abdominal aortic aneurysm just above the bifurcation and involving the    right common iliac artery again noted measuring up to 3 cm. No free fluid. There are now multifocal lytic lesions involving the right and left iliac    bones, right L3 transverse process, thoracic and lumbar vertebral bodies    and left ninth rib may represent metastatic disease or multiple myeloma,    correlate with history. Assessment/Plan:   Gonsalo Marrufo is a 65 y. o. female h/o hypertension, porcelain gallbladder and CAD admitted to the hospital with acute kidney injury and hypercalcemia.      Acute kidney injury 2/2 Hypercalcemia - On HD, tolerating. Dr. Dewain Angelucci following. Next HD session tomorrow (4/6). -- Strict I/Os  -- Nephrology following  -- Monitor lytes  -- HD      Hypercalcemia 2/2 Multiple Myeloma- Ca 7.4 this AM, when corrected for albumin 8.1, s/p Calcitonin (200 mg IM x 1) + Pamidronate (60 mg IV x 1)  -- Monitor labs, daily RFP  -- Cont HD     Multiple Myeloma w/ Hyperkalemia & Hypercalcemia, New Lytic bone lesions - CT showed new multifocal lytic lesions involving the right and left iliac bones, right L3 transverse process, thoracic and lumbar vertebral bodies and left ninth rib. -- Heme/Onc treating MM  -- BM biopsy (3/31/20):  Clustered plasma cells account for about 80-90% of cells with sheets of plasma cells effacing the normal marrow elements.   -- Velcade + Decadron (start: 4/1/220), seems to be tolerating chemo with exception of nausea     Hypertension  -- continue Amlodipine, Metoprolol  -- hold Lisinopril-HCTZ      Nausea, Vomiting  -- Anti-emetics, prn  -- Monitor QTC, follow-up EKG in AM     CAD, HLD  -- home statin      Code Status: Full  FEN: Renal diet  PPX: SubQ heparin  DISPO: Linda Francis MD  Internal Medicine, PGY-2   04/05/20  8:36 AM    This patient has been staffed and discussed with Trupti Jo MD.

## 2020-04-06 LAB
ALBUMIN SERPL-MCNC: 3.3 G/DL (ref 3.4–5)
ANION GAP SERPL CALCULATED.3IONS-SCNC: 22 MMOL/L (ref 3–16)
BUN BLDV-MCNC: 99 MG/DL (ref 7–20)
CALCIUM SERPL-MCNC: 7.2 MG/DL (ref 8.3–10.6)
CHLORIDE BLD-SCNC: 96 MMOL/L (ref 99–110)
CO2: 20 MMOL/L (ref 21–32)
CREAT SERPL-MCNC: 9.6 MG/DL (ref 0.6–1.2)
GFR AFRICAN AMERICAN: 5
GFR NON-AFRICAN AMERICAN: 4
GLUCOSE BLD-MCNC: 103 MG/DL (ref 70–99)
HCT VFR BLD CALC: 29.3 % (ref 36–48)
HEMOGLOBIN: 9.8 G/DL (ref 12–16)
MCH RBC QN AUTO: 29.5 PG (ref 26–34)
MCHC RBC AUTO-ENTMCNC: 33.4 G/DL (ref 31–36)
MCV RBC AUTO: 88.2 FL (ref 80–100)
PDW BLD-RTO: 14.5 % (ref 12.4–15.4)
PHOSPHORUS: 5.9 MG/DL (ref 2.5–4.9)
PLATELET # BLD: 132 K/UL (ref 135–450)
PMV BLD AUTO: 9.3 FL (ref 5–10.5)
POTASSIUM SERPL-SCNC: 4 MMOL/L (ref 3.5–5.1)
RBC # BLD: 3.32 M/UL (ref 4–5.2)
SODIUM BLD-SCNC: 138 MMOL/L (ref 136–145)
WBC # BLD: 12.6 K/UL (ref 4–11)

## 2020-04-06 PROCEDURE — 6370000000 HC RX 637 (ALT 250 FOR IP): Performed by: INTERNAL MEDICINE

## 2020-04-06 PROCEDURE — 2580000003 HC RX 258: Performed by: STUDENT IN AN ORGANIZED HEALTH CARE EDUCATION/TRAINING PROGRAM

## 2020-04-06 PROCEDURE — 85027 COMPLETE CBC AUTOMATED: CPT

## 2020-04-06 PROCEDURE — 6370000000 HC RX 637 (ALT 250 FOR IP): Performed by: STUDENT IN AN ORGANIZED HEALTH CARE EDUCATION/TRAINING PROGRAM

## 2020-04-06 PROCEDURE — 6360000002 HC RX W HCPCS: Performed by: STUDENT IN AN ORGANIZED HEALTH CARE EDUCATION/TRAINING PROGRAM

## 2020-04-06 PROCEDURE — 99232 SBSQ HOSP IP/OBS MODERATE 35: CPT | Performed by: INTERNAL MEDICINE

## 2020-04-06 PROCEDURE — 80069 RENAL FUNCTION PANEL: CPT

## 2020-04-06 PROCEDURE — 2060000000 HC ICU INTERMEDIATE R&B

## 2020-04-06 PROCEDURE — 90935 HEMODIALYSIS ONE EVALUATION: CPT

## 2020-04-06 RX ORDER — PANTOPRAZOLE SODIUM 40 MG/10ML
40 INJECTION, POWDER, LYOPHILIZED, FOR SOLUTION INTRAVENOUS DAILY
Status: DISCONTINUED | OUTPATIENT
Start: 2020-04-06 | End: 2020-04-06

## 2020-04-06 RX ORDER — HEPARIN SODIUM 1000 [USP'U]/ML
2500 INJECTION, SOLUTION INTRAVENOUS; SUBCUTANEOUS PRN
Status: DISCONTINUED | OUTPATIENT
Start: 2020-04-06 | End: 2020-04-07 | Stop reason: HOSPADM

## 2020-04-06 RX ORDER — PANTOPRAZOLE SODIUM 40 MG/1
40 TABLET, DELAYED RELEASE ORAL
Status: DISCONTINUED | OUTPATIENT
Start: 2020-04-06 | End: 2020-04-07 | Stop reason: HOSPADM

## 2020-04-06 RX ADMIN — HEPARIN SODIUM 5000 UNITS: 5000 INJECTION INTRAVENOUS; SUBCUTANEOUS at 05:50

## 2020-04-06 RX ADMIN — PANTOPRAZOLE SODIUM 40 MG: 40 TABLET, DELAYED RELEASE ORAL at 16:24

## 2020-04-06 RX ADMIN — METOPROLOL TARTRATE 25 MG: 25 TABLET, FILM COATED ORAL at 16:24

## 2020-04-06 RX ADMIN — METOPROLOL TARTRATE 25 MG: 25 TABLET, FILM COATED ORAL at 21:40

## 2020-04-06 RX ADMIN — ACYCLOVIR 400 MG: 400 TABLET ORAL at 16:24

## 2020-04-06 RX ADMIN — PROMETHAZINE HYDROCHLORIDE 12.5 MG: 12.5 TABLET ORAL at 09:23

## 2020-04-06 RX ADMIN — Medication 10 ML: at 21:40

## 2020-04-06 RX ADMIN — AMLODIPINE BESYLATE 5 MG: 5 TABLET ORAL at 16:24

## 2020-04-06 RX ADMIN — HEPARIN SODIUM 5000 UNITS: 5000 INJECTION INTRAVENOUS; SUBCUTANEOUS at 16:23

## 2020-04-06 RX ADMIN — ACYCLOVIR 400 MG: 400 TABLET ORAL at 21:40

## 2020-04-06 RX ADMIN — ONDANSETRON 4 MG: 2 INJECTION INTRAMUSCULAR; INTRAVENOUS at 01:24

## 2020-04-06 RX ADMIN — ONDANSETRON 4 MG: 2 INJECTION INTRAMUSCULAR; INTRAVENOUS at 16:24

## 2020-04-06 RX ADMIN — Medication 10 ML: at 09:23

## 2020-04-06 RX ADMIN — Medication 10 ML: at 16:24

## 2020-04-06 RX ADMIN — HEPARIN SODIUM 5000 UNITS: 5000 INJECTION INTRAVENOUS; SUBCUTANEOUS at 21:40

## 2020-04-06 RX ADMIN — ATORVASTATIN CALCIUM 40 MG: 40 TABLET, FILM COATED ORAL at 21:40

## 2020-04-06 ASSESSMENT — PAIN SCALES - GENERAL
PAINLEVEL_OUTOF10: 0

## 2020-04-06 ASSESSMENT — ENCOUNTER SYMPTOMS
CHEST TIGHTNESS: 0
ABDOMINAL PAIN: 0
DIARRHEA: 0
SHORTNESS OF BREATH: 0
VOMITING: 0

## 2020-04-06 NOTE — PROGRESS NOTES
Occupational Therapy  Refusal Note    Chart reviewed. OT attempted to see patient at 11:41 AM. Pt refused therapy stating \"I'm tired and I don't want to get up. I'm going to dialysis soon. Please just come back tomorrow. \" RN aware. Will follow up later this afternoon/tomorrow as schedule allows.     Lavern Coles, OT

## 2020-04-06 NOTE — PROGRESS NOTES
without palpable adenopathy  BACK: Straight negative CVAT  SKIN: warm dry and intact without lesions rashes or masses  CHEST: CTA bilaterally without use of accessory muscles  CV: Normal S1 S2, RRR, no MRG  ABD: NT ND normoactive BS, no palpable masses or hepatosplenomegaly  EXTREMITIES: without edema, denies calf tenderness  NEURO: CN II - XII grossly intact  CATHETER: hemodialysis catheter site clean/dry and intact     Data    CBC:   Recent Labs     20   WBC 9.6 10.2 12.6*   HGB 8.9* 9.1* 9.8*   HCT 26.7* 26.8* 29.3*   MCV 88.4 88.2 88.2    135 132*     BMP/Mag:  Recent Labs     20    135* 138   K 4.6 4.4 4.0   CL 98* 96* 96*   CO2 23 22 20*   PHOS 4.9 6.1* 5.9*   BUN 48* 77* 99*   CREATININE 6.0* 8.2* 9.6*     LIVP:   No results for input(s): AST, ALT, LIPASE, BILIDIR, BILITOT, ALKPHOS in the last 72 hours. Invalid input(s): AMYLASE,  ALB  Coags:   No results for input(s): PROTIME, INR, APTT in the last 72 hours. Uric Acid   No results for input(s): LABURIC in the last 72 hours. ASSESSMENT AND PLAN:           Newly diagnosed Multiple Myeloma stage III by ISS    -Presented with hypercalcemia, WANDER (Cr. 9.2), anemia  -MMP              Beta 2 Microglobulin:  20.8              Lambda LC:  >74,800, Kappa - ratio:  Unable to calculate              SPEP/UPEP:  1.3 g/dl               BM biopsy 3/31/20:  Clustered plasma cells account for about 80-90% of cells with sheets of plasma  cells effacing the normal marrow elements. FISH and CG pending              Skeletal survey:  Several small progressive lytic lesions in the skull.     Plan:  Started  Velcade 1.3 mg SubQ on 2020 . Completed Decadron 40 mg PO daily for 4 days and now on Decadron 10 mg   Scheduled for Velcade day 1,4,8,11   Cycle 1 Day 6  Started acyclovir for HSV prophylaxis /2      2.  ID: afebrile   Leukocytosis - continue to monitor   -

## 2020-04-07 VITALS
HEART RATE: 71 BPM | DIASTOLIC BLOOD PRESSURE: 64 MMHG | WEIGHT: 120.37 LBS | OXYGEN SATURATION: 98 % | TEMPERATURE: 98.5 F | SYSTOLIC BLOOD PRESSURE: 123 MMHG | HEIGHT: 64 IN | RESPIRATION RATE: 16 BRPM | BODY MASS INDEX: 20.55 KG/M2

## 2020-04-07 LAB
ALBUMIN SERPL-MCNC: 3 G/DL (ref 3.4–5)
ANION GAP SERPL CALCULATED.3IONS-SCNC: 20 MMOL/L (ref 3–16)
BUN BLDV-MCNC: 54 MG/DL (ref 7–20)
CALCIUM SERPL-MCNC: 7.4 MG/DL (ref 8.3–10.6)
CHLORIDE BLD-SCNC: 99 MMOL/L (ref 99–110)
CO2: 20 MMOL/L (ref 21–32)
CREAT SERPL-MCNC: 6.7 MG/DL (ref 0.6–1.2)
GFR AFRICAN AMERICAN: 7
GFR NON-AFRICAN AMERICAN: 6
GLUCOSE BLD-MCNC: 165 MG/DL (ref 70–99)
GLUCOSE BLD-MCNC: 57 MG/DL (ref 70–99)
GLUCOSE BLD-MCNC: 60 MG/DL (ref 70–99)
GLUCOSE BLD-MCNC: 61 MG/DL (ref 70–99)
HCT VFR BLD CALC: 28.2 % (ref 36–48)
HEMOGLOBIN: 9.2 G/DL (ref 12–16)
MCH RBC QN AUTO: 29.4 PG (ref 26–34)
MCHC RBC AUTO-ENTMCNC: 32.7 G/DL (ref 31–36)
MCV RBC AUTO: 89.9 FL (ref 80–100)
PDW BLD-RTO: 14.6 % (ref 12.4–15.4)
PERFORMED ON: ABNORMAL
PHOSPHORUS: 4.1 MG/DL (ref 2.5–4.9)
PLATELET # BLD: 109 K/UL (ref 135–450)
PMV BLD AUTO: 10.3 FL (ref 5–10.5)
POTASSIUM SERPL-SCNC: 3.8 MMOL/L (ref 3.5–5.1)
RBC # BLD: 3.13 M/UL (ref 4–5.2)
SODIUM BLD-SCNC: 139 MMOL/L (ref 136–145)
WBC # BLD: 12.7 K/UL (ref 4–11)

## 2020-04-07 PROCEDURE — 80069 RENAL FUNCTION PANEL: CPT

## 2020-04-07 PROCEDURE — 6370000000 HC RX 637 (ALT 250 FOR IP): Performed by: INTERNAL MEDICINE

## 2020-04-07 PROCEDURE — 85027 COMPLETE CBC AUTOMATED: CPT

## 2020-04-07 PROCEDURE — 97110 THERAPEUTIC EXERCISES: CPT

## 2020-04-07 PROCEDURE — 6370000000 HC RX 637 (ALT 250 FOR IP): Performed by: NURSE PRACTITIONER

## 2020-04-07 PROCEDURE — 97116 GAIT TRAINING THERAPY: CPT

## 2020-04-07 PROCEDURE — 2580000003 HC RX 258: Performed by: STUDENT IN AN ORGANIZED HEALTH CARE EDUCATION/TRAINING PROGRAM

## 2020-04-07 PROCEDURE — 6370000000 HC RX 637 (ALT 250 FOR IP): Performed by: STUDENT IN AN ORGANIZED HEALTH CARE EDUCATION/TRAINING PROGRAM

## 2020-04-07 PROCEDURE — 99238 HOSP IP/OBS DSCHRG MGMT 30/<: CPT | Performed by: INTERNAL MEDICINE

## 2020-04-07 PROCEDURE — 6360000002 HC RX W HCPCS: Performed by: STUDENT IN AN ORGANIZED HEALTH CARE EDUCATION/TRAINING PROGRAM

## 2020-04-07 PROCEDURE — 90935 HEMODIALYSIS ONE EVALUATION: CPT

## 2020-04-07 PROCEDURE — 2580000003 HC RX 258: Performed by: NURSE PRACTITIONER

## 2020-04-07 RX ORDER — ACYCLOVIR 400 MG/1
400 TABLET ORAL 2 TIMES DAILY
Qty: 60 TABLET | Refills: 5 | Status: CANCELLED | OUTPATIENT
Start: 2020-04-07

## 2020-04-07 RX ORDER — PANTOPRAZOLE SODIUM 40 MG/1
40 TABLET, DELAYED RELEASE ORAL
Qty: 30 TABLET | Refills: 1 | Status: CANCELLED | OUTPATIENT
Start: 2020-04-08

## 2020-04-07 RX ORDER — DEXAMETHASONE 2 MG/1
10 TABLET ORAL SEE ADMIN INSTRUCTIONS
Qty: 10 TABLET | Refills: 0 | Status: ON HOLD | OUTPATIENT
Start: 2020-04-07 | End: 2020-09-04 | Stop reason: HOSPADM

## 2020-04-07 RX ORDER — ACYCLOVIR 400 MG/1
400 TABLET ORAL 2 TIMES DAILY
Qty: 20 TABLET | Refills: 0 | Status: SHIPPED | OUTPATIENT
Start: 2020-04-07 | End: 2020-04-17

## 2020-04-07 RX ORDER — DEXAMETHASONE 2 MG/1
TABLET ORAL
Qty: 10 TABLET | Refills: 0 | Status: CANCELLED | OUTPATIENT
Start: 2020-04-07

## 2020-04-07 RX ORDER — PANTOPRAZOLE SODIUM 40 MG/1
40 TABLET, DELAYED RELEASE ORAL
Qty: 30 TABLET | Refills: 0 | Status: SHIPPED | OUTPATIENT
Start: 2020-04-08 | End: 2020-05-06 | Stop reason: SDUPTHER

## 2020-04-07 RX ORDER — ACYCLOVIR 400 MG/1
400 TABLET ORAL 2 TIMES DAILY
Qty: 60 TABLET | Refills: 0 | Status: CANCELLED
Start: 2020-04-07

## 2020-04-07 RX ADMIN — PANTOPRAZOLE SODIUM 40 MG: 40 TABLET, DELAYED RELEASE ORAL at 06:58

## 2020-04-07 RX ADMIN — METOPROLOL TARTRATE 25 MG: 25 TABLET, FILM COATED ORAL at 08:10

## 2020-04-07 RX ADMIN — ACYCLOVIR 400 MG: 400 TABLET ORAL at 08:10

## 2020-04-07 RX ADMIN — HEPARIN SODIUM 5000 UNITS: 5000 INJECTION INTRAVENOUS; SUBCUTANEOUS at 06:10

## 2020-04-07 RX ADMIN — DEXTROSE 50 % IN WATER (D50W) INTRAVENOUS SYRINGE 12.5 G: at 05:51

## 2020-04-07 RX ADMIN — DEXTROSE 15 G: 15 GEL ORAL at 05:36

## 2020-04-07 RX ADMIN — ONDANSETRON 4 MG: 2 INJECTION INTRAMUSCULAR; INTRAVENOUS at 08:10

## 2020-04-07 RX ADMIN — Medication 10 ML: at 08:10

## 2020-04-07 ASSESSMENT — PAIN SCALES - GENERAL
PAINLEVEL_OUTOF10: 0

## 2020-04-07 NOTE — PROGRESS NOTES
Nutrition Assessment (Low Risk)    Type and Reason for Visit: Initial(LOS)    Nutrition Recommendations:   Continue with current diet. Suggests removing renal restriction as it is not indicated with WANDER and to aid with improving PO intake     Nutrition Assessment:  Patient assessed for nutritional risk. Deemed to be at low risk at this time. Will continue to monitor for changes in status. Pt admitted for WANDER. At nutritional compromise given inadequate PO intake. [place] to assess weight gain/ loss d/t scales discrepencies. Malnutrition Assessment:  · Malnutrition Status:  At risk for malnutrition    Nutrition Risk Level   Risk Level: Low    Nutrition Diagnosis:   · Problem: Inadequate oral intake  · Etiology: Acute injury/trauma    Signs and symptoms: Intake 25-50%    Nutrition Intervention:  Food and/or Delivery: Modify current diet  Nutrition Education/Counseling/Coordination of Care:  Continued Inpatient Monitoring      Electronically signed by Miles Maciel RD, LD on 4/7/20 at 3:20 PM EDT    Contact Number: [phone]

## 2020-04-07 NOTE — CARE COORDINATION
Needed    LOC at discharge: Not Applicable  MAGNO Completed: No    Notification completed in HENS/PAS?:  Not Applicable    IMM Completed:   Yes, Case management has presented and reviewed IMM letter #2 to the patient and/or family/ POA. Patient and/or family/POA verbalized understanding of their medicare rights and appeal process if needed. Patient and/or family/POA has signed, initialed and placed today's date (.td) and time [phone]) on IMM letter #2 on the the appropriate lines. Patient and/or family/POA, copy of letter offered and they are aware that this original copy of IMM letter #2 is available prior to discharge from the paper chart on the unit. Electronic documentation has been entered into epic for IMM letter #2 and original paper copy has been added to the paper chart at the nurses station. Transportation:  Transportation PLAN for discharge: family   Mode of Transport: Private Car  Reason for medical transport: Not Applicable  Name of [address] [place] [address], [address]: Not Applicable  Time of Transport: afternoon    Transport form completed: No    Home Care:  [address] ordered at discharge: No  [address]: Not Applicable  Orders faxed: No    Durable Medical Equipment:  DME Provider: n/a  Equipment obtained during hospitalization: n/a    Home Oxygen and Respiratory Equipment:  Oxygen needed at discharge?: No  [facility]: Not Applicable  Portable tank available for discharge?: No    Dialysis:  Dialysis patient: Yes    Dialysis Center:  [facility]  Address: [address]  Phone: [phone]    Hospice Services:  Location: Not Applicable  Agency: Not Applicable    Consents signed: No    Referrals made at [facility] for outpatient continued care:  Not Applicable    Additional CM Notes:  Met with patient at bedside. She is declining [facility] services at this time.  She was informed that her dialysis will be at [address] initiated precert for the patient and we are awaiting approval before she is assigned her time. Medicare letter reviewed. Patient in agreement to her discharge plan. The Plan for Transition of Care is related to the following treatment goals of Acute kidney injury [facility]) [N17.9]  Acute kidney injury ([facility] [address] [N17.9]    The Patient and/or patient representative Charlotte Person and her family were provided with a choice of provider and agrees with the discharge plan Yes    Freedom of choice list was provided with basic dialogue that supports the patient's individualized plan of care/goals and shares the quality data associated with the providers.  No    Care Transitions patient: Yes    Arianna Tang  [facility]  Case Management Department  Ph: [phone]  Fax: [phone]

## 2020-04-07 NOTE — PROGRESS NOTES
last 72 hours. BNP: No results for input(s): BNP in the last 72 hours. Lipids: No results for input(s): CHOL, HDL in the last 72 hours. Invalid input(s): LDLCALCU, TRIGLYCERIDE  ABGs:  No results for input(s): PHART, ZVG9HFI, PO2ART, YYC4DQM, BEART, THGBART, Z5DWUWUH, AFH6BFZ in the last 72 hours. INR:   No results for input(s): INR in the last 72 hours. Lactate: No results for input(s): LACTATE in the last 72 hours. Cultures:  -----------------------------------------------------------------  RAD:   IR TUNNELED CVC PLACE WO SQ PORT/PUMP > 5 YEARS   Final Result   IMPRESSION :      Limited ultrasound examination demonstrates a patent right internal jugular vein. Placement of tunnelled right jugular dialysis catheter. Fluoroscopy time : 0.3   Number of exposures obtained : 1   Moderate sedation was provided and monitored by an independent trained observer. Moderate sedation start time : 1004   Moderate sedation end time : 1017   Blood loss : minimal (less than 5 cc)            XR Bone Survey Complete   Final Result      Bone survey films are compared to 7/25/2017. There are several small lytic lesions in the skull measuring up to 5 mm. This is progressive since the prior study. Moderate degenerative changes in the lower cervical spine. Severe degenerative changes in the thumbs bilaterally. Old traumatic or postoperative changes in the right acromial clavicular joint. Moderate bilateral hip arthritis. Bilateral femoral artery    calcification. Severe degenerative changes in the lower lumbar spine. Moderate lower thoracic degenerative changes. CT ABDOMEN PELVIS WO CONTRAST Additional Contrast? None   Final Result       The gallbladder is again distended with areas of peripheral wall    calcification consistent with history of porcelain gallbladder. No    surrounding inflammatory change. Diverticulosis without diverticulitis. No bowel dilation or free air. Abdominal aortic aneurysm just above the bifurcation and involving the    right common iliac artery again noted measuring up to 3 cm. No free fluid. There are now multifocal lytic lesions involving the right and left iliac    bones, right L3 transverse process, thoracic and lumbar vertebral bodies    and left ninth rib may represent metastatic disease or multiple myeloma,    correlate with history.              ============================================================================================  Assessment/Plan:   Clemente Cevallos is a 65 y.o. female h/o hypertension, porcelain gallbladder and CAD admitted to the hospital with acute kidney injury and hypercalcemia. ## Acute kidney injury 2/2 Hypercalcemia - Euvolemic on exam, CT abdomen pelvis completed to rule out obstruction, noted new lytic bone lesions. Discussed with Dr. Gerry Pimentel from Nephrology. Suspect nausea and vomiting from renal rather than GI pathology. New hypercalcemia, anemia, new lytic bone lesions, elevated total protein. Vitamin D 25: 26 (low). Beta-2-microglobulin: 21 (high). SPEP/UPEP: 1.3 g/dl. -- hold Lisinopril-HCTZ   -- Strict I/Os  -- Nephrology consulted  -- HD, will continue as OP     ## Hypercalcemia 2/2 Multiple Myeloma - s/p Calcitonin (200 mg IM x 1) + Pamidronate (60 mg IV x 1). -- Monitor labs  -- HD     ## Multiple Myeloma w/ Hyperkalemia & Hypercalcemia, New Lytic bone lesions - CT showed new multifocal lytic lesions involving the right and left iliac bones, right L3 transverse process, thoracic and lumbar vertebral bodies and left ninth rib. Of note, patient has had a previous BM biopsy for work up of MM, which was negative. CT chest in 2017 did not show a lung mass. No lymphadenopathy on exam. Breast exam revealed no palpable masses. Mammogram in 2017 was negative. Concern for MM given positive CRAB criteria and elevated total protein.   -- Heme/Onc treating MM  -- BM biopsy (3/31/20):  Clustered

## 2020-04-07 NOTE — PROGRESS NOTES
Nephrology  Note                                                                                                                                                                                                                                                                                                                                                               Office : [phone]     Fax :[phone]              Patient's Name: Román Cage    C/o  nausea  HD again today   No fever   No SOB  Vitals stable       Past Medical History:   Diagnosis Date    Allergic rhinitis     Anemia     CAD (coronary artery disease)     CAD (coronary artery disease)     Diverticulosis     GERD (gastroesophageal reflux disease)     History of colonoscopy     Hyperlipidemia     Hypertension     Myocardial infarction, inferior wall [facility]) 2006       Past Surgical History:   Procedure Laterality Date    BREAST REDUCTION SURGERY  Bilat     SECTION  x3    HYSTERECTOMY      TYMPANOPLASTY         Family History   Problem Relation Age of Onset    Heart Disease Mother     High Cholesterol Mother     Heart Disease Sister         hypertension    Diabetes Sister         hypertension    High Cholesterol Sister         hypertension    Heart Disease Brother     High Cholesterol Brother     Stroke Brother         hypertension        reports that she has been smoking. She has a 22.50 pack-year smoking history. She has never used smokeless tobacco. She reports that she does not drink alcohol or use drugs. Allergies:  Patient has no known allergies.     Current Medications:    pantoprazole (PROTONIX) tablet 40 mg, QAM AC  heparin (porcine) injection 2,500 Units, PRN  promethazine (PHENERGAN) tablet 12.5 mg, Q6H PRN  metoprolol tartrate (LOPRESSOR) tablet 25 mg, BID  heparin (porcine) injection 3,200 Units, PRN  acyclovir (ZOVIRAX) tablet 400 mg, BID  glucose (GLUTOSE) 40 % oral gel 15 g, PRN  dextrose

## 2020-04-07 NOTE — PROGRESS NOTES
2-5  Times per day: Daily  Current Treatment Recommendations: Strengthening, Transfer Training, Endurance Training, Neuromuscular Re-education, Patient/Caregiver Education & Training, ROM, Equipment Evaluation, Education, & procurement, Balance Training, Gait Training, Home Exercise Program, Functional Mobility Training, Stair training, Safety Education & Training  Safety Devices  Type of devices: Call light within reach, Chair alarm in place, Left in chair, Nurse notified     Therapy Time   Individual Concurrent Group Co-treatment   Time In 1149         Time Out 1214         Minutes 25           Timed Code Treatment Minutes:   25    Total Treatment Minutes:  [address] Kirby Jovel, PT

## 2020-04-07 NOTE — PROGRESS NOTES
Nephrology  Note                                                                                                                                                                                                                                                                                                                                                               Office : [phone]     Fax :[phone]              Patient's Name: Edward Johnson    C/o  nausea  HD today   No fever   No SOB  Vitals stable       Past Medical History:   Diagnosis Date    Allergic rhinitis     Anemia     CAD (coronary artery disease)     CAD (coronary artery disease)     Diverticulosis     GERD (gastroesophageal reflux disease)     History of colonoscopy     Hyperlipidemia     Hypertension     Myocardial infarction, inferior wall [facility]) 2006       Past Surgical History:   Procedure Laterality Date    BREAST REDUCTION SURGERY  Bilat     SECTION  x3    HYSTERECTOMY      TYMPANOPLASTY         Family History   Problem Relation Age of Onset    Heart Disease Mother     High Cholesterol Mother     Heart Disease Sister         hypertension    Diabetes Sister         hypertension    High Cholesterol Sister         hypertension    Heart Disease Brother     High Cholesterol Brother     Stroke Brother         hypertension        reports that she has been smoking. She has a 22.50 pack-year smoking history. She has never used smokeless tobacco. She reports that she does not drink alcohol or use drugs. Allergies:  Patient has no known allergies.     Current Medications:    pantoprazole (PROTONIX) tablet 40 mg, QAM AC  heparin (porcine) injection 2,500 Units, PRN  promethazine (PHENERGAN) tablet 12.5 mg, Q6H PRN  metoprolol tartrate (LOPRESSOR) tablet 25 mg, BID  heparin (porcine) injection 3,200 Units, PRN  acyclovir (ZOVIRAX) tablet 400 mg, BID  glucose (GLUTOSE) 40 % oral gel 15 g, PRN  dextrose 50 % with areas of peripheral wall    calcification consistent with history of porcelain gallbladder. No    surrounding inflammatory change. Diverticulosis without diverticulitis. No bowel dilation or free air. Abdominal aortic aneurysm just above the bifurcation and involving the    right common iliac artery again noted measuring up to 3 cm. No free fluid. There are now multifocal lytic lesions involving the right and left iliac    bones, right L3 transverse process, thoracic and lumbar vertebral bodies    and left ninth rib may represent metastatic disease or multiple myeloma,    correlate with history. Assessment/Plan   1. WANDER     2. Multiple myeloma     3. Anemia    4. Acid- base/ Electrolyte imbalance     5.  Hypercalcemia     Plan   - Pt has severe WANDER   - on RRT - next HD today   - HD unit placement - UD renal hannah   - HD cath placed   - UO is better   - BM bx - reviewed   - on chemo   - Monitor UO and renal function   - labs in am                      Thank you for allowing us to participate in care of Caitlyn Piper MD   Feel free to contact me   [facility] [address]  Office : [phone]  Fax :[phone]

## 2020-04-24 ENCOUNTER — VIRTUAL VISIT (OUTPATIENT)
Dept: INTERNAL MEDICINE CLINIC | Age: 70
End: 2020-04-24
Payer: MEDICARE

## 2020-04-24 VITALS
HEIGHT: 64 IN | DIASTOLIC BLOOD PRESSURE: 73 MMHG | BODY MASS INDEX: 20.66 KG/M2 | TEMPERATURE: 98.6 F | HEART RATE: 78 BPM | SYSTOLIC BLOOD PRESSURE: 122 MMHG

## 2020-04-24 PROBLEM — Z99.2 ESRD (END STAGE RENAL DISEASE) ON DIALYSIS (HCC): Status: ACTIVE | Noted: 2020-04-24

## 2020-04-24 PROBLEM — E83.52 HYPERCALCEMIA: Status: RESOLVED | Noted: 2020-04-01 | Resolved: 2020-04-24

## 2020-04-24 PROBLEM — N18.6 ESRD (END STAGE RENAL DISEASE) ON DIALYSIS (HCC): Status: ACTIVE | Noted: 2020-04-24

## 2020-04-24 PROBLEM — N17.9 ACUTE KIDNEY INJURY (HCC): Status: RESOLVED | Noted: 2020-03-30 | Resolved: 2020-04-24

## 2020-04-24 PROCEDURE — 1090F PRES/ABSN URINE INCON ASSESS: CPT | Performed by: INTERNAL MEDICINE

## 2020-04-24 PROCEDURE — G8420 CALC BMI NORM PARAMETERS: HCPCS | Performed by: INTERNAL MEDICINE

## 2020-04-24 PROCEDURE — G8510 SCR DEP NEG, NO PLAN REQD: HCPCS | Performed by: INTERNAL MEDICINE

## 2020-04-24 PROCEDURE — 3288F FALL RISK ASSESSMENT DOCD: CPT | Performed by: INTERNAL MEDICINE

## 2020-04-24 PROCEDURE — 99213 OFFICE O/P EST LOW 20 MIN: CPT | Performed by: INTERNAL MEDICINE

## 2020-04-24 PROCEDURE — 4004F PT TOBACCO SCREEN RCVD TLK: CPT | Performed by: INTERNAL MEDICINE

## 2020-04-24 PROCEDURE — G8399 PT W/DXA RESULTS DOCUMENT: HCPCS | Performed by: INTERNAL MEDICINE

## 2020-04-24 PROCEDURE — G8427 DOCREV CUR MEDS BY ELIG CLIN: HCPCS | Performed by: INTERNAL MEDICINE

## 2020-04-24 PROCEDURE — 4040F PNEUMOC VAC/ADMIN/RCVD: CPT | Performed by: INTERNAL MEDICINE

## 2020-04-24 PROCEDURE — 1111F DSCHRG MED/CURRENT MED MERGE: CPT | Performed by: INTERNAL MEDICINE

## 2020-04-24 PROCEDURE — 3017F COLORECTAL CA SCREEN DOC REV: CPT | Performed by: INTERNAL MEDICINE

## 2020-04-24 PROCEDURE — 1123F ACP DISCUSS/DSCN MKR DOCD: CPT | Performed by: INTERNAL MEDICINE

## 2020-04-24 RX ORDER — MULTIVIT-MIN/IRON/FOLIC ACID/K 18-600-40
CAPSULE ORAL DAILY
Status: ON HOLD | COMMUNITY
End: 2020-09-04 | Stop reason: HOSPADM

## 2020-04-24 ASSESSMENT — PATIENT HEALTH QUESTIONNAIRE - PHQ9
2. FEELING DOWN, DEPRESSED OR HOPELESS: 0
SUM OF ALL RESPONSES TO PHQ9 QUESTIONS 1 & 2: 0
SUM OF ALL RESPONSES TO PHQ QUESTIONS 1-9: 0
1. LITTLE INTEREST OR PLEASURE IN DOING THINGS: 0
SUM OF ALL RESPONSES TO PHQ QUESTIONS 1-9: 0

## 2020-05-04 RX ORDER — LISINOPRIL AND HYDROCHLOROTHIAZIDE 25; 20 MG/1; MG/1
TABLET ORAL
Qty: 30 TABLET | Refills: 5 | Status: SHIPPED | OUTPATIENT
Start: 2020-05-04 | End: 2020-05-04 | Stop reason: SDUPTHER

## 2020-05-04 RX ORDER — LISINOPRIL AND HYDROCHLOROTHIAZIDE 25; 20 MG/1; MG/1
TABLET ORAL
Qty: 30 TABLET | Refills: 5 | Status: SHIPPED
Start: 2020-05-04 | End: 2020-05-06 | Stop reason: SINTOL

## 2020-05-04 RX ORDER — ATORVASTATIN CALCIUM 40 MG/1
40 TABLET, FILM COATED ORAL NIGHTLY
Qty: 90 TABLET | Refills: 3 | Status: ON HOLD | OUTPATIENT
Start: 2020-05-04 | End: 2020-09-04 | Stop reason: HOSPADM

## 2020-05-06 RX ORDER — PANTOPRAZOLE SODIUM 40 MG/1
40 TABLET, DELAYED RELEASE ORAL
Qty: 30 TABLET | Refills: 3 | Status: SHIPPED | OUTPATIENT
Start: 2020-05-06 | End: 2020-05-14 | Stop reason: SDUPTHER

## 2020-05-06 RX ORDER — PANTOPRAZOLE SODIUM 40 MG/1
TABLET, DELAYED RELEASE ORAL
Qty: 30 TABLET | Refills: 0 | OUTPATIENT
Start: 2020-05-06

## 2020-05-06 NOTE — TELEPHONE ENCOUNTER
The patient's daughter is questioning if she is still suppose to be taking her     lisinopril-hydroCHLOROthiazide (PRINZIDE;ZESTORETIC) 20-25 MG per tablet     She is requesting a return call

## 2020-05-14 RX ORDER — PANTOPRAZOLE SODIUM 40 MG/1
40 TABLET, DELAYED RELEASE ORAL
Qty: 30 TABLET | Refills: 3 | Status: SHIPPED | OUTPATIENT
Start: 2020-05-14 | End: 2020-05-18

## 2020-05-18 RX ORDER — PANTOPRAZOLE SODIUM 40 MG/1
TABLET, DELAYED RELEASE ORAL
Qty: 90 TABLET | Refills: 0 | Status: SHIPPED | OUTPATIENT
Start: 2020-05-18

## 2020-05-26 DIAGNOSIS — N17.9 AKI (ACUTE KIDNEY INJURY) (HCC): ICD-10-CM

## 2020-05-26 LAB
ALBUMIN SERPL-MCNC: 4 G/DL (ref 3.4–5)
ANION GAP SERPL CALCULATED.3IONS-SCNC: 17 MMOL/L (ref 3–16)
BUN BLDV-MCNC: 35 MG/DL (ref 7–20)
CALCIUM SERPL-MCNC: 8.7 MG/DL (ref 8.3–10.6)
CHLORIDE BLD-SCNC: 100 MMOL/L (ref 99–110)
CO2: 16 MMOL/L (ref 21–32)
CREAT SERPL-MCNC: 1.6 MG/DL (ref 0.6–1.2)
GFR AFRICAN AMERICAN: 38
GFR NON-AFRICAN AMERICAN: 32
GLUCOSE BLD-MCNC: 158 MG/DL (ref 70–99)
PHOSPHORUS: 3.5 MG/DL (ref 2.5–4.9)
POTASSIUM SERPL-SCNC: 3.8 MMOL/L (ref 3.5–5.1)
SODIUM BLD-SCNC: 133 MMOL/L (ref 136–145)

## 2020-05-29 ENCOUNTER — OFFICE VISIT (OUTPATIENT)
Dept: PRIMARY CARE CLINIC | Age: 70
End: 2020-05-29

## 2020-05-31 LAB
SARS-COV-2: NOT DETECTED
SOURCE: NORMAL

## 2020-06-02 ENCOUNTER — PRE-PROCEDURE TELEPHONE (OUTPATIENT)
Dept: CARDIAC CATH/INVASIVE PROCEDURES | Age: 70
End: 2020-06-02

## 2020-06-03 ENCOUNTER — HOSPITAL ENCOUNTER (OUTPATIENT)
Dept: INTERVENTIONAL RADIOLOGY/VASCULAR | Age: 70
Discharge: HOME | End: 2020-06-03
Attending: RADIOLOGY | Admitting: RADIOLOGY
Payer: MEDICARE

## 2020-06-03 VITALS — BODY MASS INDEX: 20.73 KG/M2 | HEIGHT: 66 IN | TEMPERATURE: 99 F | WEIGHT: 129 LBS

## 2020-06-03 LAB
HCT VFR BLD CALC: 25.9 % (ref 36–48)
HEMOGLOBIN: 8.4 G/DL (ref 12–16)
INR BLD: 1.14 (ref 0.86–1.14)
MCH RBC QN AUTO: 31.1 PG (ref 26–34)
MCHC RBC AUTO-ENTMCNC: 32.5 G/DL (ref 31–36)
MCV RBC AUTO: 95.6 FL (ref 80–100)
PDW BLD-RTO: 21.2 % (ref 12.4–15.4)
PLATELET # BLD: 171 K/UL (ref 135–450)
PMV BLD AUTO: 9.7 FL (ref 5–10.5)
PROTHROMBIN TIME: 13.3 SEC (ref 10–13.2)
RBC # BLD: 2.71 M/UL (ref 4–5.2)
WBC # BLD: 7 K/UL (ref 4–11)

## 2020-06-03 PROCEDURE — 2709999900 HC NON-CHARGEABLE SUPPLY

## 2020-06-03 PROCEDURE — 85610 PROTHROMBIN TIME: CPT

## 2020-06-03 PROCEDURE — 2500000003 HC RX 250 WO HCPCS

## 2020-06-03 PROCEDURE — 36589 REMOVAL TUNNELED CV CATH: CPT

## 2020-06-03 PROCEDURE — 85027 COMPLETE CBC AUTOMATED: CPT

## 2020-06-03 PROCEDURE — 6360000002 HC RX W HCPCS

## 2020-06-03 RX ORDER — CALCIUM CARBONATE 500(1250)
500 TABLET ORAL DAILY
Status: ON HOLD | COMMUNITY
End: 2020-09-04 | Stop reason: HOSPADM

## 2020-06-03 NOTE — H&P
mouth nightly 90 tablet 3    Cholecalciferol (VITAMIN D) 50 MCG (2000 UT) CAPS capsule Take by mouth      dexamethasone (DECADRON) 2 MG tablet Take 5 tablets by mouth See Admin Instructions Take 5 tablets (10 mg) once per day, on Wed 4/8 & Sat 4/11 (2 doses in total). 10 tablet 0    acetaminophen (TYLENOL) 325 MG tablet Take 650 mg by mouth every 6 hours as needed for Pain      amLODIPine (NORVASC) 5 MG tablet TAKE 1 TABLET BY MOUTH ONCE DAILY 90 tablet 3    potassium chloride (KLOR-CON M) 20 MEQ extended release tablet TAKE 1 TABLET BY MOUTH ONCE DAILY         Current Meds  No current facility-administered medications for this encounter.          ASA 1 - Normal health patient    I (soft palate, uvula, fauces, tonsillar pillars visible)    Activity:  2 - Able to move 4 extremities voluntarily on command  Respiration:  2 - Able to breathe deeply and cough freely  Circulation:  2 - BP+/- 20mmHg of normal  Consciousness:  2 - Fully awake  Oxygen Saturation (color):  2 - Able to maintain oxygen saturation >92% on room air    Sedation : Moderate sedation planned

## 2020-07-21 ENCOUNTER — NURSE ONLY (OUTPATIENT)
Dept: PRIMARY CARE CLINIC | Age: 70
End: 2020-07-21
Payer: MEDICARE

## 2020-07-21 PROCEDURE — 99211 OFF/OP EST MAY X REQ PHY/QHP: CPT | Performed by: NURSE PRACTITIONER

## 2020-07-23 LAB
REPORT: NORMAL
SARS-COV-2: NOT DETECTED
THIS TEST SENT TO: NORMAL

## 2020-07-27 ENCOUNTER — HOSPITAL ENCOUNTER (OUTPATIENT)
Dept: CT IMAGING | Age: 70
Discharge: HOME | End: 2020-07-27
Payer: COMMERCIAL

## 2020-07-27 VITALS
HEART RATE: 69 BPM | DIASTOLIC BLOOD PRESSURE: 79 MMHG | OXYGEN SATURATION: 99 % | SYSTOLIC BLOOD PRESSURE: 160 MMHG | TEMPERATURE: 98 F | RESPIRATION RATE: 17 BRPM

## 2020-07-27 LAB
APTT: 23.7 SEC (ref 24.2–36.2)
BASOPHILS ABSOLUTE: 0 K/UL (ref 0–0.2)
BASOPHILS RELATIVE PERCENT: 0.6 %
EOSINOPHILS ABSOLUTE: 0 K/UL (ref 0–0.6)
EOSINOPHILS RELATIVE PERCENT: 0.3 %
HCT VFR BLD CALC: 31.3 % (ref 36–48)
HEMOGLOBIN: 9.9 G/DL (ref 12–16)
INR BLD: 1.04 (ref 0.86–1.14)
LYMPHOCYTES ABSOLUTE: 1 K/UL (ref 1–5.1)
LYMPHOCYTES RELATIVE PERCENT: 18.1 %
MCH RBC QN AUTO: 29 PG (ref 26–34)
MCHC RBC AUTO-ENTMCNC: 31.8 G/DL (ref 31–36)
MCV RBC AUTO: 91.2 FL (ref 80–100)
MONOCYTES ABSOLUTE: 0.5 K/UL (ref 0–1.3)
MONOCYTES RELATIVE PERCENT: 9.4 %
NEUTROPHILS ABSOLUTE: 3.9 K/UL (ref 1.7–7.7)
NEUTROPHILS RELATIVE PERCENT: 71.6 %
PDW BLD-RTO: 19.2 % (ref 12.4–15.4)
PLATELET # BLD: 229 K/UL (ref 135–450)
PMV BLD AUTO: 8.4 FL (ref 5–10.5)
PROTHROMBIN TIME: 12.1 SEC (ref 10–13.2)
RBC # BLD: 3.43 M/UL (ref 4–5.2)
WBC # BLD: 5.4 K/UL (ref 4–11)

## 2020-07-27 PROCEDURE — 88342 IMHCHEM/IMCYTCHM 1ST ANTB: CPT

## 2020-07-27 PROCEDURE — 36415 COLL VENOUS BLD VENIPUNCTURE: CPT

## 2020-07-27 PROCEDURE — 88313 SPECIAL STAINS GROUP 2: CPT

## 2020-07-27 PROCEDURE — 38221 DX BONE MARROW BIOPSIES: CPT

## 2020-07-27 PROCEDURE — 88341 IMHCHEM/IMCYTCHM EA ADD ANTB: CPT

## 2020-07-27 PROCEDURE — 7100000010 HC PHASE II RECOVERY - FIRST 15 MIN

## 2020-07-27 PROCEDURE — 85025 COMPLETE CBC W/AUTO DIFF WBC: CPT

## 2020-07-27 PROCEDURE — 85730 THROMBOPLASTIN TIME PARTIAL: CPT

## 2020-07-27 PROCEDURE — 88305 TISSUE EXAM BY PATHOLOGIST: CPT

## 2020-07-27 PROCEDURE — 85610 PROTHROMBIN TIME: CPT

## 2020-07-27 PROCEDURE — 88311 DECALCIFY TISSUE: CPT

## 2020-07-27 PROCEDURE — 6360000002 HC RX W HCPCS: Performed by: RADIOLOGY

## 2020-07-27 PROCEDURE — 7100000011 HC PHASE II RECOVERY - ADDTL 15 MIN

## 2020-07-27 RX ORDER — MIDAZOLAM HYDROCHLORIDE 1 MG/ML
1 INJECTION INTRAMUSCULAR; INTRAVENOUS ONCE
Status: COMPLETED | OUTPATIENT
Start: 2020-07-27 | End: 2020-07-27

## 2020-07-27 RX ORDER — OXYCODONE HYDROCHLORIDE AND ACETAMINOPHEN 5; 325 MG/1; MG/1
1 TABLET ORAL EVERY 4 HOURS PRN
Status: DISCONTINUED | OUTPATIENT
Start: 2020-07-27 | End: 2020-07-28 | Stop reason: HOSPADM

## 2020-07-27 RX ORDER — FENTANYL CITRATE 50 UG/ML
50 INJECTION, SOLUTION INTRAMUSCULAR; INTRAVENOUS ONCE
Status: COMPLETED | OUTPATIENT
Start: 2020-07-27 | End: 2020-07-27

## 2020-07-27 RX ADMIN — FENTANYL CITRATE 50 MCG: 50 INJECTION, SOLUTION INTRAMUSCULAR; INTRAVENOUS at 10:30

## 2020-07-27 RX ADMIN — MIDAZOLAM HYDROCHLORIDE 1 MG: 2 INJECTION, SOLUTION INTRAMUSCULAR; INTRAVENOUS at 10:30

## 2020-07-27 ASSESSMENT — PAIN SCALES - GENERAL
PAINLEVEL_OUTOF10: 1
PAINLEVEL_OUTOF10: 0
PAINLEVEL_OUTOF10: 2
PAINLEVEL_OUTOF10: 2

## 2020-07-27 ASSESSMENT — PAIN DESCRIPTION - PAIN TYPE
TYPE: SURGICAL PAIN

## 2020-07-27 NOTE — PROGRESS NOTES
IR  H & P      Patient:  Leatha Mcdowell   :   1950      Relevant patient history reviewed and discussed. CC: myeloma    The procedure including risks and benefits was discussed at length with the patient (or designated family member) and all questions were answered. Informed consent to proceed with the procedure was given. Heart : regular rate and rhythm  Lungs : clear, breathing easily  Airway Assessment: Mallampati 2  Condition : stable    [place] Scale: Activity:  2 - Able to move 4 extremities voluntarily on command  Respiration:  2 - Able to breathe deeply and cough freely  Circulation:  2 - BP+/- 20mmHg of normal  Consciousness:  2 - Fully awake  Oxygen Saturation (color):  2 - Able to maintain oxygen saturation >92% on room air      [facility] reviewed and agreed. Medications reviewed. Current Outpatient Medications on File Prior to Encounter   Medication Sig Dispense Refill    calcium carbonate (OSCAL) 500 MG TABS tablet Take 500 mg by mouth daily      acyclovir (ZOVIRAX) 400 MG tablet 1 tablet 2 times daily      ondansetron (ZOFRAN) 8 MG tablet TAKE 1 TABLET BY MOUTH EVERY 8 HOURS AS NEEDED FOR NAUSEA AND VOMITING      potassium chloride (KLOR-CON M) 20 MEQ extended release tablet TAKE 1 TABLET BY MOUTH ONCE DAILY      metoprolol tartrate (LOPRESSOR) 25 MG tablet Take 1 tablet by mouth twice daily 180 tablet 0    pantoprazole (PROTONIX) 40 MG tablet TAKE 1 TABLET BY MOUTH ONCE DAILY IN THE MORNING BEFORE BREAKFAST 90 tablet 0    atorvastatin (LIPITOR) 40 MG tablet Take 1 tablet by mouth nightly 90 tablet 3    Cholecalciferol (VITAMIN D) 50 MCG ( UT) CAPS capsule Take by mouth      dexamethasone (DECADRON) 2 MG tablet Take 5 tablets by mouth See Admin Instructions Take 5 tablets (10 mg) once per day, on  & Sat  (2 doses in total).  10 tablet 0    acetaminophen (TYLENOL) 325 MG tablet Take 650 mg by mouth every 6 hours as needed for Pain      amLODIPine (NORVASC) 5 MG tablet TAKE 1 TABLET BY MOUTH ONCE DAILY 90 tablet 3     No current facility-administered medications on file prior to encounter.       Allergies: No Known Allergies  Sedation : Moderate sedation planned  ASA 2 - Patient with mild systemic disease with no functional limitations

## 2020-07-27 NOTE — PROGRESS NOTES
IMAGING SERVICES NURSING PROGRESS NOTE    Procedure:  Bone marrow biopsy  July 27, 2020  Leodan Benitez      Allergies:  No Known Allergies    Vitals:    07/27/20 1034   BP: (!) 184/75   Pulse: 68   Resp: 16   SpO2: 100%       Recent lab work reviewed with MD: prosper Adan  Procedure explained to patient by MD: yes   Informed consent obtained:yes  Family with patient:yes    Mental Status:  Normal  Readiness to learn:  Yes  Barriers to learning: No    Pain Assessment Pre-Procedure:  Pain Present:  no  Pain Score:  0  Pain Quality/Description:      Time out Procedure Verification with:  [x] RN  [x] Physician  [x] Patient  [x] Other: CT Technologist  Procedure site marked, if applicable:  Yes    Note: Patient arrived A & O x 4, denies pain, breathing easily on room air, Spoke to Dr. Johanny Jo prior to procedure. Procedural sedation:  Fentanyl: 50 mcg  Versed:  1  mg  Post Procedureal Note:  Patient tolerated procedure well. Bone marrow Biopsy  . Breathing easily on room air. Report given to [facility]. Patient transported in stable conditon to room 7. Pain Assessment Post-Procedure:  Pain Present:  no  Pain Score:  0  Pain Quality/Description:      Plan of Care Goals:  Safety measures met:  Yes  Patient understands explanation of procedure:  Yes    Time in: 1030  Time out: 2360 Ellie Torres R.N. 7/27/2020

## 2020-07-28 RX ORDER — POTASSIUM CHLORIDE 20 MEQ/1
20 TABLET, EXTENDED RELEASE ORAL 2 TIMES DAILY
Qty: 60 TABLET | Refills: 5 | Status: ON HOLD | OUTPATIENT
Start: 2020-07-28 | End: 2020-09-04 | Stop reason: HOSPADM

## 2020-07-30 RX ORDER — AMLODIPINE BESYLATE 5 MG/1
TABLET ORAL
Qty: 90 TABLET | Refills: 0 | Status: SHIPPED | OUTPATIENT
Start: 2020-07-30 | End: 2020-07-31

## 2020-07-31 RX ORDER — AMLODIPINE BESYLATE 5 MG/1
TABLET ORAL
Qty: 90 TABLET | Refills: 0 | Status: ON HOLD | OUTPATIENT
Start: 2020-07-31 | End: 2020-08-14 | Stop reason: HOSPADM

## 2020-08-05 ENCOUNTER — HOSPITAL ENCOUNTER (INPATIENT)
Age: 70
LOS: 4 days | Discharge: HOME | DRG: 841 | End: 2020-08-10
Attending: EMERGENCY MEDICINE | Admitting: INTERNAL MEDICINE
Payer: COMMERCIAL

## 2020-08-05 LAB
ANION GAP SERPL CALCULATED.3IONS-SCNC: 11 MMOL/L (ref 3–16)
BACTERIA: ABNORMAL /HPF
BASOPHILS ABSOLUTE: 0 K/UL (ref 0–0.2)
BASOPHILS RELATIVE PERCENT: 0.3 %
BILIRUBIN URINE: NEGATIVE
BLOOD, URINE: NEGATIVE
BUN BLDV-MCNC: 26 MG/DL (ref 7–20)
CALCIUM SERPL-MCNC: 9.6 MG/DL (ref 8.3–10.6)
CHLORIDE BLD-SCNC: 98 MMOL/L (ref 99–110)
CLARITY: CLEAR
CO2: 17 MMOL/L (ref 21–32)
COARSE CASTS, UA: ABNORMAL /LPF (ref 0–2)
COLOR: YELLOW
CREAT SERPL-MCNC: 2.9 MG/DL (ref 0.6–1.2)
EOSINOPHILS ABSOLUTE: 0 K/UL (ref 0–0.6)
EOSINOPHILS RELATIVE PERCENT: 0.8 %
EPITHELIAL CELLS, UA: ABNORMAL /HPF (ref 0–5)
GFR AFRICAN AMERICAN: 19
GFR NON-AFRICAN AMERICAN: 16
GLUCOSE BLD-MCNC: 118 MG/DL (ref 70–99)
GLUCOSE URINE: NEGATIVE MG/DL
HCT VFR BLD CALC: 30.9 % (ref 36–48)
HEMOGLOBIN: 9.8 G/DL (ref 12–16)
KETONES, URINE: NEGATIVE MG/DL
LEUKOCYTE ESTERASE, URINE: ABNORMAL
LYMPHOCYTES ABSOLUTE: 0.5 K/UL (ref 1–5.1)
LYMPHOCYTES RELATIVE PERCENT: 8.3 %
MCH RBC QN AUTO: 29.5 PG (ref 26–34)
MCHC RBC AUTO-ENTMCNC: 31.6 G/DL (ref 31–36)
MCV RBC AUTO: 93.2 FL (ref 80–100)
MICROSCOPIC EXAMINATION: YES
MONOCYTES ABSOLUTE: 0.6 K/UL (ref 0–1.3)
MONOCYTES RELATIVE PERCENT: 9.9 %
NEUTROPHILS ABSOLUTE: 4.8 K/UL (ref 1.7–7.7)
NEUTROPHILS RELATIVE PERCENT: 80.7 %
NITRITE, URINE: NEGATIVE
PDW BLD-RTO: 19.6 % (ref 12.4–15.4)
PH UA: 7 (ref 5–8)
PLATELET # BLD: 186 K/UL (ref 135–450)
PMV BLD AUTO: 7.8 FL (ref 5–10.5)
POTASSIUM REFLEX MAGNESIUM: 5.7 MMOL/L (ref 3.5–5.1)
PROTEIN UA: ABNORMAL MG/DL
RBC # BLD: 3.32 M/UL (ref 4–5.2)
RBC UA: ABNORMAL /HPF (ref 0–4)
SODIUM BLD-SCNC: 126 MMOL/L (ref 136–145)
SPECIFIC GRAVITY UA: 1.01 (ref 1–1.03)
URINE TYPE: ABNORMAL
UROBILINOGEN, URINE: 0.2 E.U./DL
WBC # BLD: 6 K/UL (ref 4–11)
WBC UA: ABNORMAL /HPF (ref 0–5)

## 2020-08-05 PROCEDURE — 85025 COMPLETE CBC W/AUTO DIFF WBC: CPT

## 2020-08-05 PROCEDURE — 81001 URINALYSIS AUTO W/SCOPE: CPT

## 2020-08-05 PROCEDURE — 51798 US URINE CAPACITY MEASURE: CPT

## 2020-08-05 PROCEDURE — 99284 EMERGENCY DEPT VISIT MOD MDM: CPT

## 2020-08-05 PROCEDURE — 80048 BASIC METABOLIC PNL TOTAL CA: CPT

## 2020-08-05 PROCEDURE — 51702 INSERT TEMP BLADDER CATH: CPT

## 2020-08-05 ASSESSMENT — ENCOUNTER SYMPTOMS
RESPIRATORY NEGATIVE: 1
GASTROINTESTINAL NEGATIVE: 1
EYES NEGATIVE: 1

## 2020-08-05 NOTE — ED PROVIDER NOTES
[facility] RESIDENT NOTE       Date of evaluation: 2020    Chief Complaint     No chief complaint on file. History of Present Illness     Arnulfo Olmos is a 65 y.o. female who presents with chief complaint of urinary retention. Patient reports that in the past 24 hours she has not urinated. This is abnormal for her and has never happened in the past.  She denies starting any new medications. She does have urinary urgency. There is no associated abdominal pain. She denies any fever or chills. She does have good p.o. intake today as she has drinking approximately 36 ounces of fluids. Yesterday she voided approximately 4-5 times. She recently had a tunneled dialysis catheter placed which is now removed. During that time she was still voiding well. She denies any lower extremity swelling. Review of Systems     Review of Systems   Constitutional: Negative. HENT: Negative. Eyes: Negative. Respiratory: Negative. Cardiovascular: Negative. Gastrointestinal: Negative. Endocrine: Negative. Genitourinary: Positive for decreased urine volume. Musculoskeletal: Negative. Skin: Negative. Neurological: Negative. Hematological: Negative. Psychiatric/Behavioral: Negative. Past Medical, Surgical, Family, and Social History     She has a past medical history of Allergic rhinitis, Anemia, CAD (coronary artery disease), CAD (coronary artery disease), Diverticulosis, ESRD (end stage renal disease) on dialysis ([address] GERD (gastroesophageal reflux disease), History of colonoscopy, Hyperlipidemia, Hypertension, and Myocardial infarction, inferior wall ([address] She has a past surgical history that includes  section (x3); Hysterectomy; Tympanoplasty; Breast reduction surgery (Bilat); and CT BIOPSY BONE MARROW (2020). Her family history includes Diabetes in her sister;  Heart Disease in her brother, mother, and sister; High Cholesterol in her brother, mother, and sister; Stroke in her brother. She reports that she has been smoking. She has a 22.50 pack-year smoking history. She has never used smokeless tobacco. She reports that she does not drink alcohol or use drugs. Medications     Previous Medications    ACETAMINOPHEN (TYLENOL) 325 MG TABLET    Take 650 mg by mouth every 6 hours as needed for Pain    ACYCLOVIR (ZOVIRAX) 400 MG TABLET    1 tablet 2 times daily    AMLODIPINE (NORVASC) 5 MG TABLET    Take 1 tablet by mouth once daily    ATORVASTATIN (LIPITOR) 40 MG TABLET    Take 1 tablet by mouth nightly    CALCIUM CARBONATE (OSCAL) 500 MG TABS TABLET    Take 500 mg by mouth daily    CHOLECALCIFEROL (VITAMIN D) 50 MCG (2000 UT) CAPS CAPSULE    Take by mouth    DEXAMETHASONE (DECADRON) 2 MG TABLET    Take 5 tablets by mouth See Admin Instructions Take 5 tablets (10 mg) once per day, on Wed 4/8 & Sat 4/11 (2 doses in total). METOPROLOL TARTRATE (LOPRESSOR) 25 MG TABLET    Take 1 tablet by mouth twice daily    ONDANSETRON (ZOFRAN) 8 MG TABLET    TAKE 1 TABLET BY MOUTH EVERY 8 HOURS AS NEEDED FOR NAUSEA AND VOMITING    PANTOPRAZOLE (PROTONIX) 40 MG TABLET    TAKE 1 TABLET BY MOUTH ONCE DAILY IN THE MORNING BEFORE BREAKFAST    POTASSIUM CHLORIDE (KLOR-CON M) 20 MEQ EXTENDED RELEASE TABLET    Take 1 tablet by mouth 2 times daily       Allergies     She has No Known Allergies. Physical Exam     INITIAL VITALS: BP: (!) 148/67, Temp: 99 °F (37.2 °C), Pulse: 82, Resp: 18, SpO2: 98 %   Physical Exam  Constitutional:       Appearance: Normal appearance. HENT:      Head: Normocephalic and atraumatic. Neck:      Musculoskeletal: Normal range of motion and neck supple. Cardiovascular:      Rate and Rhythm: Normal rate and regular rhythm. Pulses: Normal pulses. Heart sounds: Normal heart sounds. Pulmonary:      Effort: Pulmonary effort is normal.      Breath sounds: Normal breath sounds.    Abdominal: General: Abdomen is flat. Bowel sounds are normal.   Musculoskeletal: Normal range of motion. Skin:     General: Skin is warm and dry. Neurological:      General: No focal deficit present. Mental Status: She is alert.    Psychiatric:         Mood and Affect: Mood normal.         Diagnostic Results       RADIOLOGY:  No orders to display       LABS:   Results for orders placed or performed during the hospital encounter of 08/05/20   Urinalysis, reflex to microscopic (Lab)   Result Value Ref Range    Color, UA Yellow Straw/Yellow    Clarity, UA Clear Clear    Glucose, Ur Negative Negative mg/dL    Bilirubin Urine Negative Negative    Ketones, Urine Negative Negative mg/dL    Specific Gravity, UA 1.010 1.005 - 1.030    Blood, Urine Negative Negative    pH, UA 7.0 5.0 - 8.0    Protein, UA TRACE (A) Negative mg/dL    Urobilinogen, Urine 0.2 <2.0 E.U./dL    Nitrite, Urine Negative Negative    Leukocyte Esterase, Urine SMALL (A) Negative    Microscopic Examination YES     Urine Type Voided    CBC auto differential   Result Value Ref Range    WBC 6.0 4.0 - 11.0 K/uL    RBC 3.32 (L) 4.00 - 5.20 M/uL    Hemoglobin 9.8 (L) 12.0 - 16.0 g/dL    Hematocrit 30.9 (L) 36.0 - 48.0 %    MCV 93.2 80.0 - 100.0 fL    MCH 29.5 26.0 - 34.0 pg    MCHC 31.6 31.0 - 36.0 g/dL    RDW 19.6 (H) 12.4 - 15.4 %    Platelets 943 811 - 341 K/uL    MPV 7.8 5.0 - 10.5 fL    Neutrophils % 80.7 %    Lymphocytes % 8.3 %    Monocytes % 9.9 %    Eosinophils % 0.8 %    Basophils % 0.3 %    Neutrophils Absolute 4.8 1.7 - 7.7 K/uL    Lymphocytes Absolute 0.5 (L) 1.0 - 5.1 K/uL    Monocytes Absolute 0.6 0.0 - 1.3 K/uL    Eosinophils Absolute 0.0 0.0 - 0.6 K/uL    Basophils Absolute 0.0 0.0 - 0.2 K/uL   Basic Metabolic Panel w/ Reflex to MG   Result Value Ref Range    Sodium 126 (L) 136 - 145 mmol/L    Potassium reflex Magnesium 5.7 (H) 3.5 - 5.1 mmol/L    Chloride 98 (L) 99 - 110 mmol/L    CO2 17 (L) 21 - 32 mmol/L    Anion Gap 11 3 - 16    Glucose 118 (H) 70 - 99 mg/dL    BUN 26 (H) 7 - 20 mg/dL    CREATININE 2.9 (H) 0.6 - 1.2 mg/dL    GFR Non- 16 (A) >60    GFR  19 (A) >60    Calcium 9.6 8.3 - 10.6 mg/dL   Microscopic Urinalysis   Result Value Ref Range    Coarse Casts, UA 0-2 0 - 2 /LPF    WBC, UA 3-5 0 - 5 /HPF    RBC, UA None seen 0 - 4 /HPF    Epithelial Cells, UA 6-10 (A) 0 - 5 /HPF    Bacteria, UA 1+ (A) None Seen /HPF       ED BEDSIDE ULTRASOUND:      RECENT VITALS:  BP: (!) 148/67, Temp: 99 °F (37.2 °C),Pulse: 82, Resp: 18, SpO2: 98 %     Procedures         ED Course     Nursing Notes, Past Medical Hx, Past Surgical Hx, Social Hx, Allergies, and FamilyHx were reviewed. The patient was giventhe following medications:  No orders of the defined types were placed in this encounter. CONSULTS:  None    MEDICAL DECISION MAKING / ASSESSMENT / PLAN     Amanda Traylor is a 65 y.o. female presents with a chief complaint of urinary retention. Bladder scan was performed showed 100 mL of retained urine, therefore retention is not caused by any anatomical obstruction. Metabolic panel is significant for an acute kidney injury, with a creatinine of 2.9 and elevated potassium of 5.7. Due to her recent diagnosis of multiple myeloma and acute kidney injury at the time of diagnosis, believes that these are related to her urinary retention at this time. She will have to be admitted for increased work-up. Urine analysis is unremarkable. This patient was also evaluated by the attending physician. All care plans were discussed and agreed upon. Clinical Impression     1. Acute renal failure, unspecified acute renal failure type ([facility] [address]        Disposition     PATIENT REFERRED TO:  No follow-up provider specified.     DISCHARGE MEDICATIONS:  New Prescriptions    No medications on file       DISPOSITION Decision To Admit 08/05/2020 08:22:12 PM     Hamilton Arroyo MD  Resident  08/05/20 2035

## 2020-08-06 ENCOUNTER — APPOINTMENT (OUTPATIENT)
Dept: CT IMAGING | Age: 70
DRG: 841 | End: 2020-08-06
Payer: COMMERCIAL

## 2020-08-06 PROBLEM — N17.9 ACUTE KIDNEY INJURY SUPERIMPOSED ON CHRONIC KIDNEY DISEASE (HCC): Status: ACTIVE | Noted: 2020-08-06

## 2020-08-06 PROBLEM — N18.9 ACUTE KIDNEY INJURY SUPERIMPOSED ON CHRONIC KIDNEY DISEASE (HCC): Status: ACTIVE | Noted: 2020-08-06

## 2020-08-06 LAB
ANION GAP SERPL CALCULATED.3IONS-SCNC: 12 MMOL/L (ref 3–16)
BUN BLDV-MCNC: 26 MG/DL (ref 7–20)
CALCIUM SERPL-MCNC: 9.6 MG/DL (ref 8.3–10.6)
CHLORIDE BLD-SCNC: 100 MMOL/L (ref 99–110)
CHLORIDE URINE RANDOM: 118 MMOL/L
CO2: 18 MMOL/L (ref 21–32)
CREAT SERPL-MCNC: 3 MG/DL (ref 0.6–1.2)
CREATININE URINE: 20.3 MG/DL (ref 28–259)
EOSINOPHIL,URINE: NORMAL
GFR AFRICAN AMERICAN: 19
GFR NON-AFRICAN AMERICAN: 15
GLUCOSE BLD-MCNC: 214 MG/DL (ref 70–99)
GLUCOSE BLD-MCNC: 95 MG/DL (ref 70–99)
MAGNESIUM: 0.7 MG/DL (ref 1.8–2.4)
OSMOLALITY URINE: 268 MOSM/KG (ref 390–1070)
PERFORMED ON: NORMAL
POTASSIUM REFLEX MAGNESIUM: 3.4 MMOL/L (ref 3.5–5.1)
POTASSIUM REFLEX MAGNESIUM: 3.6 MMOL/L (ref 3.5–5.1)
POTASSIUM REFLEX MAGNESIUM: 3.8 MMOL/L (ref 3.5–5.1)
POTASSIUM REFLEX MAGNESIUM: 3.9 MMOL/L (ref 3.5–5.1)
POTASSIUM, UR: 7.7 MMOL/L
PROTEIN PROTEIN: 30.1 MG/DL
SODIUM BLD-SCNC: 130 MMOL/L (ref 136–145)
SODIUM URINE: 122 MMOL/L
TOTAL CK: 32 U/L (ref 26–192)
UREA NITROGEN, UR: 112 MG/DL (ref 800–1666)

## 2020-08-06 PROCEDURE — 84300 ASSAY OF URINE SODIUM: CPT

## 2020-08-06 PROCEDURE — 6360000002 HC RX W HCPCS: Performed by: STUDENT IN AN ORGANIZED HEALTH CARE EDUCATION/TRAINING PROGRAM

## 2020-08-06 PROCEDURE — 1200000000 HC SEMI PRIVATE

## 2020-08-06 PROCEDURE — 36415 COLL VENOUS BLD VENIPUNCTURE: CPT

## 2020-08-06 PROCEDURE — 2580000003 HC RX 258: Performed by: STUDENT IN AN ORGANIZED HEALTH CARE EDUCATION/TRAINING PROGRAM

## 2020-08-06 PROCEDURE — 6370000000 HC RX 637 (ALT 250 FOR IP): Performed by: STUDENT IN AN ORGANIZED HEALTH CARE EDUCATION/TRAINING PROGRAM

## 2020-08-06 PROCEDURE — 82436 ASSAY OF URINE CHLORIDE: CPT

## 2020-08-06 PROCEDURE — 93005 ELECTROCARDIOGRAM TRACING: CPT | Performed by: STUDENT IN AN ORGANIZED HEALTH CARE EDUCATION/TRAINING PROGRAM

## 2020-08-06 PROCEDURE — 83735 ASSAY OF MAGNESIUM: CPT

## 2020-08-06 PROCEDURE — 84156 ASSAY OF PROTEIN URINE: CPT

## 2020-08-06 PROCEDURE — 84132 ASSAY OF SERUM POTASSIUM: CPT

## 2020-08-06 PROCEDURE — 83883 ASSAY NEPHELOMETRY NOT SPEC: CPT

## 2020-08-06 PROCEDURE — 84133 ASSAY OF URINE POTASSIUM: CPT

## 2020-08-06 PROCEDURE — 80048 BASIC METABOLIC PNL TOTAL CA: CPT

## 2020-08-06 PROCEDURE — 82570 ASSAY OF URINE CREATININE: CPT

## 2020-08-06 PROCEDURE — 84165 PROTEIN E-PHORESIS SERUM: CPT

## 2020-08-06 PROCEDURE — 82550 ASSAY OF CK (CPK): CPT

## 2020-08-06 PROCEDURE — 84155 ASSAY OF PROTEIN SERUM: CPT

## 2020-08-06 PROCEDURE — 84540 ASSAY OF URINE/UREA-N: CPT

## 2020-08-06 PROCEDURE — 99222 1ST HOSP IP/OBS MODERATE 55: CPT | Performed by: INTERNAL MEDICINE

## 2020-08-06 PROCEDURE — 87205 SMEAR GRAM STAIN: CPT

## 2020-08-06 PROCEDURE — 83935 ASSAY OF URINE OSMOLALITY: CPT

## 2020-08-06 PROCEDURE — 74176 CT ABD & PELVIS W/O CONTRAST: CPT

## 2020-08-06 RX ORDER — AMLODIPINE BESYLATE 5 MG/1
5 TABLET ORAL DAILY
Status: DISCONTINUED | OUTPATIENT
Start: 2020-08-06 | End: 2020-08-09

## 2020-08-06 RX ORDER — SODIUM CHLORIDE 0.9 % (FLUSH) 0.9 %
10 SYRINGE (ML) INJECTION EVERY 12 HOURS SCHEDULED
Status: DISCONTINUED | OUTPATIENT
Start: 2020-08-06 | End: 2020-08-14 | Stop reason: HOSPADM

## 2020-08-06 RX ORDER — SODIUM POLYSTYRENE SULFONATE 15 G/60ML
15 SUSPENSION ORAL; RECTAL ONCE
Status: COMPLETED | OUTPATIENT
Start: 2020-08-06 | End: 2020-08-06

## 2020-08-06 RX ORDER — SODIUM CHLORIDE 0.9 % (FLUSH) 0.9 %
10 SYRINGE (ML) INJECTION PRN
Status: DISCONTINUED | OUTPATIENT
Start: 2020-08-06 | End: 2020-08-14 | Stop reason: HOSPADM

## 2020-08-06 RX ORDER — FUROSEMIDE 10 MG/ML
40 INJECTION INTRAMUSCULAR; INTRAVENOUS ONCE
Status: COMPLETED | OUTPATIENT
Start: 2020-08-06 | End: 2020-08-06

## 2020-08-06 RX ORDER — 0.9 % SODIUM CHLORIDE 0.9 %
1000 INTRAVENOUS SOLUTION INTRAVENOUS ONCE
Status: COMPLETED | OUTPATIENT
Start: 2020-08-06 | End: 2020-08-06

## 2020-08-06 RX ORDER — SODIUM CHLORIDE 9 MG/ML
INJECTION, SOLUTION INTRAVENOUS CONTINUOUS
Status: DISCONTINUED | OUTPATIENT
Start: 2020-08-06 | End: 2020-08-08

## 2020-08-06 RX ORDER — DEXTROSE MONOHYDRATE 25 G/50ML
25 INJECTION, SOLUTION INTRAVENOUS ONCE
Status: COMPLETED | OUTPATIENT
Start: 2020-08-06 | End: 2020-08-06

## 2020-08-06 RX ORDER — ACETAMINOPHEN 325 MG/1
650 TABLET ORAL EVERY 6 HOURS PRN
Status: DISCONTINUED | OUTPATIENT
Start: 2020-08-06 | End: 2020-08-14 | Stop reason: HOSPADM

## 2020-08-06 RX ORDER — DEXTROSE MONOHYDRATE 25 G/50ML
12.5 INJECTION, SOLUTION INTRAVENOUS PRN
Status: DISCONTINUED | OUTPATIENT
Start: 2020-08-06 | End: 2020-08-14 | Stop reason: HOSPADM

## 2020-08-06 RX ORDER — PROMETHAZINE HYDROCHLORIDE 12.5 MG/1
12.5 TABLET ORAL EVERY 6 HOURS PRN
Status: DISCONTINUED | OUTPATIENT
Start: 2020-08-06 | End: 2020-08-14 | Stop reason: HOSPADM

## 2020-08-06 RX ORDER — NICOTINE POLACRILEX 4 MG
15 LOZENGE BUCCAL PRN
Status: DISCONTINUED | OUTPATIENT
Start: 2020-08-06 | End: 2020-08-14 | Stop reason: HOSPADM

## 2020-08-06 RX ORDER — MAGNESIUM SULFATE IN WATER 40 MG/ML
2 INJECTION, SOLUTION INTRAVENOUS
Status: COMPLETED | OUTPATIENT
Start: 2020-08-06 | End: 2020-08-07

## 2020-08-06 RX ORDER — POTASSIUM CHLORIDE 20 MEQ/1
40 TABLET, EXTENDED RELEASE ORAL ONCE
Status: COMPLETED | OUTPATIENT
Start: 2020-08-06 | End: 2020-08-06

## 2020-08-06 RX ORDER — ATORVASTATIN CALCIUM 40 MG/1
40 TABLET, FILM COATED ORAL NIGHTLY
Status: DISCONTINUED | OUTPATIENT
Start: 2020-08-06 | End: 2020-08-14 | Stop reason: HOSPADM

## 2020-08-06 RX ORDER — ONDANSETRON 2 MG/ML
4 INJECTION INTRAMUSCULAR; INTRAVENOUS EVERY 6 HOURS PRN
Status: DISCONTINUED | OUTPATIENT
Start: 2020-08-06 | End: 2020-08-14 | Stop reason: HOSPADM

## 2020-08-06 RX ORDER — ACETAMINOPHEN 650 MG/1
650 SUPPOSITORY RECTAL EVERY 6 HOURS PRN
Status: DISCONTINUED | OUTPATIENT
Start: 2020-08-06 | End: 2020-08-14 | Stop reason: HOSPADM

## 2020-08-06 RX ORDER — DEXTROSE MONOHYDRATE 50 MG/ML
100 INJECTION, SOLUTION INTRAVENOUS PRN
Status: DISCONTINUED | OUTPATIENT
Start: 2020-08-06 | End: 2020-08-14 | Stop reason: HOSPADM

## 2020-08-06 RX ADMIN — CALCIUM GLUCONATE 1 G: 98 INJECTION, SOLUTION INTRAVENOUS at 02:28

## 2020-08-06 RX ADMIN — SODIUM CHLORIDE: 9 INJECTION, SOLUTION INTRAVENOUS at 02:20

## 2020-08-06 RX ADMIN — Medication 10 ML: at 20:28

## 2020-08-06 RX ADMIN — DEXTROSE MONOHYDRATE 25 G: 25 INJECTION, SOLUTION INTRAVENOUS at 02:38

## 2020-08-06 RX ADMIN — INSULIN HUMAN 10 UNITS: 100 INJECTION, SOLUTION PARENTERAL at 02:38

## 2020-08-06 RX ADMIN — METOPROLOL TARTRATE 25 MG: 25 TABLET, FILM COATED ORAL at 09:57

## 2020-08-06 RX ADMIN — MAGNESIUM SULFATE HEPTAHYDRATE 2 G: 40 INJECTION, SOLUTION INTRAVENOUS at 20:27

## 2020-08-06 RX ADMIN — METOPROLOL TARTRATE 25 MG: 25 TABLET, FILM COATED ORAL at 02:30

## 2020-08-06 RX ADMIN — MAGNESIUM SULFATE HEPTAHYDRATE 2 G: 40 INJECTION, SOLUTION INTRAVENOUS at 22:16

## 2020-08-06 RX ADMIN — SODIUM CHLORIDE: 9 INJECTION, SOLUTION INTRAVENOUS at 17:23

## 2020-08-06 RX ADMIN — ATORVASTATIN CALCIUM 40 MG: 40 TABLET, FILM COATED ORAL at 20:28

## 2020-08-06 RX ADMIN — METOPROLOL TARTRATE 25 MG: 25 TABLET, FILM COATED ORAL at 20:27

## 2020-08-06 RX ADMIN — POTASSIUM CHLORIDE 40 MEQ: 1500 TABLET, EXTENDED RELEASE ORAL at 20:28

## 2020-08-06 RX ADMIN — AMLODIPINE BESYLATE 5 MG: 5 TABLET ORAL at 09:54

## 2020-08-06 RX ADMIN — ACETAMINOPHEN 650 MG: 325 TABLET ORAL at 20:27

## 2020-08-06 RX ADMIN — SODIUM CHLORIDE: 9 INJECTION, SOLUTION INTRAVENOUS at 10:00

## 2020-08-06 RX ADMIN — SODIUM POLYSTYRENE SULFONATE 15 G: 15 SUSPENSION ORAL; RECTAL at 02:33

## 2020-08-06 RX ADMIN — FUROSEMIDE 40 MG: 10 INJECTION, SOLUTION INTRAMUSCULAR; INTRAVENOUS at 02:31

## 2020-08-06 RX ADMIN — SODIUM CHLORIDE 1000 ML: 9 INJECTION, SOLUTION INTRAVENOUS at 02:20

## 2020-08-06 ASSESSMENT — PAIN DESCRIPTION - FREQUENCY: FREQUENCY: CONTINUOUS

## 2020-08-06 ASSESSMENT — PAIN DESCRIPTION - LOCATION: LOCATION: LEG

## 2020-08-06 ASSESSMENT — PAIN SCALES - GENERAL
PAINLEVEL_OUTOF10: 0
PAINLEVEL_OUTOF10: 3
PAINLEVEL_OUTOF10: 0

## 2020-08-06 ASSESSMENT — PAIN DESCRIPTION - ONSET: ONSET: ON-GOING

## 2020-08-06 ASSESSMENT — PAIN DESCRIPTION - ORIENTATION: ORIENTATION: RIGHT;LEFT

## 2020-08-06 ASSESSMENT — PAIN DESCRIPTION - PROGRESSION: CLINICAL_PROGRESSION: GRADUALLY WORSENING

## 2020-08-06 ASSESSMENT — PAIN DESCRIPTION - DESCRIPTORS: DESCRIPTORS: CRAMPING

## 2020-08-06 ASSESSMENT — PAIN DESCRIPTION - PAIN TYPE: TYPE: ACUTE PAIN

## 2020-08-06 NOTE — H&P
Internal Medicine PGY-3 Resident History & Physical      PCP: Colt Garcia MD    Date of Admission: 2020    Date of Service: Pt seen/examined on 2020 and Admitted to Inpatient with expected LOS greaterthan two midnights due to medical therapy. Chief Complaint:  Un able to make urine       History Of Present Illness:    65 y.o. female with PMHx of Multiple myeloma, ESRD not on dialysis, HTN, CVA, Porcelain gall bladder who presented to [facility] for un able to make urine of 1 day duration. Pt known case of multiply Myeloma on A acyclovir, PPI , she was doing ok until Yesterday when she noticed her diaper is dry and she did not pee all the day, This is abnormal for her even when she was on Dialysis she can make urine,  and has never happened in the past.  She denies starting any new medications, she reports she has urgency but no abdominal pain, nausea, vomiting or decrease oral intake. Of note patient was admitted to the hospital in 2020 for WANDER and started on dialysis until end of , since her symptoms improved so her lab, Tunneled catheter removed in end of  and she was doing ok until Yesterday ,     In ED Na 126, K 5.7 Cr 2.7 , Hco3 17  [place] catheter placed, around 220 cc Urine was collcted when I saw her.        Past Medical History:          Diagnosis Date    Allergic rhinitis     Anemia     CAD (coronary artery disease)     CAD (coronary artery disease)     Diverticulosis     ESRD (end stage renal disease) [facility] ([facility] 2020    GERD (gastroesophageal reflux disease)     History of colonoscopy     Hyperlipidemia     Hypertension     Myocardial infarction, inferior wall [facility]) 2006       Past Surgical History:          Procedure Laterality Date    BREAST REDUCTION SURGERY  Bilat     SECTION  x3    CT BONE MARROW BIOPSY  2020    CT BONE MARROW BIOPSY 2020 TJ CT SCAN    HYSTERECTOMY      TYMPANOPLASTY         Medications hypertension    High Cholesterol Sister         hypertension    Heart Disease Brother     High Cholesterol Brother     Stroke Brother         hypertension       REVIEW OF SYSTEMS: Pertinent positives as noted in the HPI. All other systems reviewed and negative. Constitutional: Negative. HENT: Negative. Eyes: Negative. Respiratory: Negative. Cardiovascular: Negative. Gastrointestinal: Negative. Endocrine: Negative. Genitourinary: Positive for decreased urine volume. Musculoskeletal: Negative. Skin: Negative. Neurological: Negative. Hematological: Negative. Psychiatric/Behavioral: Negative. PHYSICAL EXAM PERFORMED:    /69   Pulse 85   Temp 99 °F (37.2 °C) (Oral)   Resp 18   SpO2 93%     General appearance:  No apparent distress, appears stated age and cooperative. HEENT:  Normal cephalic,atraumatic without obvious deformity. Pupils equal, round, and reactive to light. Extra ocular muscles intact. Conjunctivae/corneas clear. Neck: Supple, with full range of motion. No jugular venous distention. Trachea midline. Respiratory:  Normal respiratory effort. Clear to auscultation, bilaterally without Rales/Wheezes/Rhonchi. Cardiovascular:  Regular rate and rhythm with normal S1/S2 without murmurs, rubs or gallops. Abdomen: Soft, non-tender, non-distended with normal bowel sounds. Musculoskeletal:  No clubbing, cyanosis oredema bilaterally. Full range of motion without deformity. Skin: Skin color, texture, turgor normal.  Norashes or lesions. Neurologic:  Neurovascularly intact without any focal sensory/motor deficits.  Cranialnerves: II-XII intact, grossly non-focal.  Psychiatric:  Alert and oriented, thought content appropriate,normal insight  Capillary Refill: Brisk,< 3 seconds   Peripheral Pulses: +2 palpable, equal bilaterally       Labs:     Recent Labs     08/05/20 1938   WBC 6.0   HGB 9.8*   HCT 30.9*        Recent Labs     08/05/20 1938   *

## 2020-08-06 NOTE — ED PROVIDER NOTES
ED Attending Attestation Note     Date of evaluation: 8/5/2020    This patient was seen by the resident. I have seen and examined the patient, agree with the workup, evaluation, management and diagnosis. The care plan has been discussed. My assessment reveals a female who presents with inability to urinate. Bladder scan does not reveal a significant volume. She has a soft abdomen that is tender in the suprapubic region without any guarding. She was able to urinate approximately 200 mL's of clear yellow urine. She was found to be in recurrent acute renal failure. Will admit for ongoing management.      Sidney Barrientos MD  08/05/20 2023

## 2020-08-06 NOTE — CONSULTS
Nephrology Consult Note                                                                                                                                                                                                                                                                                                                                                               Office : [phone]     Fax :[phone]              Patient's Name: Amanda Traylor  8:55 AM  8/6/2020    Reason for Consult:  WANDER on CKD  Requesting Physician:  Dennis Pond      Chief Complaint:  Urinary retention    History of Present Ilness:    Amanda Traylor is a 65 y.o. female with Multiple myeloma, ESRD not on dialysis, HTN, CVA, Porcelain gall bladder who presents with chief complaint of urinary retention. Patient reports that in the past 24 hours she has not urinated. Pt has multiple Myeloma on A acyclovir, PPI. Yesterday she noticed that her diaper is dry and she did not void all day. This is abnormal for her and has never happened in the past.  She denies starting any new medications. She does have urinary urgency. There is no associated abdominal pain. She denies any fever or chills. She does have good p.o. intake today as she has drinking approximately 36 ounces of fluids. Yesterday she voided approximately 4-5 times. She recently had a tunneled dialysis catheter placed which is now removed. During that time she was still voiding well. She denies any lower extremity swelling. De Leon is placed in the ER and she has only made 100 cc of urine. Patient has history of myeloma kidney and was on dialysis. Her kidney function did get better with the chemo. She was off dialysis.     Patient has new onset WANDER. 8/06:   No acute events overnight. Making good urine. Pt denies shortness of breath, chest pain, urinary sxs including urgency or feelings of retention of urine, or abdominal pain.       Review of Systems: Constitutional: Negative for chills or fevers. Respiratory: Negative for cough and shortness of breath. Cardiovascular: Negative for chest pain. Gastrointestinal: Negative for abdominal pain, diarrhea, nausea and vomiting. Genitourinary: Positive for decreased urine volume. 14 point ROS obtained but were negative except mentioned in HPI. Past Medical History:   Diagnosis Date    Allergic rhinitis     Anemia     CAD (coronary artery disease)     CAD (coronary artery disease)     Diverticulosis     ESRD (end stage renal disease) on dialysis ([facility] 2020    GERD (gastroesophageal reflux disease)     History of colonoscopy     Hyperlipidemia     Hypertension     Myocardial infarction, inferior wall [facility]) 2006       Past Surgical History:   Procedure Laterality Date    BREAST REDUCTION SURGERY  Bilat     SECTION  x3    CT BONE MARROW BIOPSY  2020    CT BONE MARROW BIOPSY 2020 [facility] CT SCAN    HYSTERECTOMY      TYMPANOPLASTY         Family History   Problem Relation Age of Onset    Heart Disease Mother     High Cholesterol Mother     Heart Disease Sister         hypertension    Diabetes Sister         hypertension    High Cholesterol Sister         hypertension    Heart Disease Brother     High Cholesterol Brother     Stroke Brother         hypertension        reports that she has been smoking. She has a 22.50 pack-year smoking history. She has never used smokeless tobacco. She reports that she does not drink alcohol or use drugs. Allergies:  Patient has no known allergies.     Current Medications:    amLODIPine (NORVASC) tablet 5 mg, Daily  atorvastatin (LIPITOR) tablet 40 mg, Nightly  metoprolol tartrate (LOPRESSOR) tablet 25 mg, BID  sodium chloride flush 0.9 % injection 10 mL, 2 times per day  sodium chloride flush 0.9 % injection 10 mL, PRN  acetaminophen (TYLENOL) tablet 650 mg, Q6H PRN    Or  acetaminophen (TYLENOL) suppository 650 mg, Q6H PRN  promethazine (PHENERGAN) tablet 12.5 mg, Q6H PRN    Or  ondansetron (ZOFRAN) injection 4 mg, Q6H PRN  0.9 % sodium chloride infusion, Continuous  glucose (GLUTOSE) 40 % oral gel 15 g, PRN  dextrose 50 % IV solution, PRN  glucagon (rDNA) injection 1 mg, PRN  dextrose 5 % solution, PRN            Physical exam:     Vitals:  BP (!) 154/76   Pulse 83   Temp 98.6 °F (37 °C) (Oral)   Resp 18   Ht 5' 5\" (1.651 m)   Wt 110 lb 3.7 oz (50 kg)   SpO2 98%   BMI 18.34 kg/m²     Constitutional:  OAA X3 NAD  Skin: no rash, turgor wnl  Heent:  eomi, mmm  Neck: no bruits or jvd noted  Cardiovascular:  S1, S2 without m/r/g  Respiratory: CTA B without w/r/r  Abdomen:  +bs, soft, nt, nd  Ext: No lower extremity edema  Psychiatric: mood and affect appropriate      Data:   Labs:  CBC:   Recent Labs     08/05/20 1938   WBC 6.0   HGB 9.8*        BMP:    Recent Labs     08/05/20 1938 08/06/20  0301 08/06/20  0636   * 130*  --    K 5.7* 3.8 3.9   CL 98* 100  --    CO2 17* 18*  --    BUN 26* 26*  --    CREATININE 2.9* 3.0*  --    GLUCOSE 118* 214*  --      Ca/Mg/Phos:   Recent Labs     08/05/20 1938 08/06/20  0301   CALCIUM 9.6 9.6     Hepatic: No results for input(s): AST, ALT, ALB, BILITOT, ALKPHOS in the last 72 hours. Troponin: No results for input(s): TROPONINI in the last 72 hours. BNP: No results for input(s): BNP in the last 72 hours. Lipids: No results for input(s): CHOL, TRIG, HDL, LDLCALC, LABVLDL in the last 72 hours. ABGs: No results for input(s): PHART, PO2ART, DLE4GPU in the last 72 hours. INR: No results for input(s): INR in the last 72 hours.   UA:  Recent Labs     08/05/20 1938   COLORU Yellow   CLARITYU Clear   GLUCOSEU Negative   BILIRUBINUR Negative   KETUA Negative   SPECGRAV 1.010   BLOODU Negative   PHUR 7.0   PROTEINU TRACE*   UROBILINOGEN 0.2   NITRU Negative   LEUKOCYTESUR SMALL*   LABMICR YES   URINETYPE Voided      Urine Microscopic:   Recent Labs     08/05/20 1938   BACTERIA 1+* WBCUA 3-5   RBCUA None seen   EPIU 6-10*     Urine Culture: No results for input(s): LABURIN in the last 72 hours.   Urine Chemistry:   Recent Labs     08/06/20  0420   CLUR 118             IMAGING:  No orders to display       Assessment/Plan   Saritha Horner is a 65 y.o. female with        WANDER on ESRD  - suspect due to possible worsening of multiple myeloma - hx of myeloma kidney  - Cr increased to 3.0 from 1.1 in 7/2020 (range is 1.1 - 12.0)  - no anion gap  - UA positive for: leukocyte esterase (small), protein (trace), epithelial cells, bact 1+  - good urinary output overnight: 1650cc  - monitor I/O  - f/u prot/Cr ratio      Hyponatremia  - Na 126 on admission -->130  - monitor Na, f/u RFP      Hyperkalemia - now resolving  - K 5.7 on admission -->3.9  - kayexalate 15g PO x1      HTN  - /67 on admission  - manage as per primary care team  - on amlodipine, lopressor       Anemia  - Hgb 9.8 on admission - at pt's baseline  - hx multiple myeloma  - monitor H/H            I will discuss patient and findings with my attending Dr. Xochilt Kirby MD.     Lola Gallardo MD  PGY-1        Thank you for allowing us to participate in care of Amor Rae free to contact  [facility] [address]  Office : [phone]  Fax :[phone]    Pt has WANDER on CKD 3   Has hx of MM   BM bx   IVF   Replace lytes     Dalila Lara MD

## 2020-08-06 NOTE — PROGRESS NOTES
Pt arrived from ED to 6S room 6319 she is alert and oriented x 4   Vital signs stable  Skin assessment completed  Call light and belonging within reach  Safety precautions in place  Pt oriented to unit and updated on care plan/status

## 2020-08-06 NOTE — PROGRESS NOTES
NUTRITION ASSESSMENT  Admission Date: 8/5/2020     Type and Reason for Visit: Initial, Positive Nutrition Screen    NUTRITION RECOMMENDATIONS:   1. PO Diet: Recommend modify to general low potassium to aid in po intakes   2. ONS: Start Nepro BID   3. Encourage po intakes     NUTRITION ASSESSMENT:  Pt is at risk for nutritional compromise AEB low BMI and report of wt loss. Pt admitted w/WANDER and urinary retention. H/o multiple myleoma, Diverticulosis, WANDER on CKD (no dialysis), and HTN. Pt out of room at time of visit for CT scan. Noted trending wt loss x3 months, -3% x 2 wks. Recommend modifying diet to general, low K d/t hyperkalemia. Will add ONS at this time and monitor per [facility]. MALNUTRITION ASSESSMENT  Context of Malnutrition: Acute Illness   Malnutrition Status: At risk for malnutrition (Comment) (suspect mod/severe malnutrition)  Findings of the 6 clinical characteristics of malnutrition (Minimum of 2 out of 6 clinical characteristics is required to make the diagnosis of moderate or severe Protein Calorie Malnutrition based on AND/ASPEN Guidelines):  Energy Intake: Less than/equal to 50% of estimated energy requirements    Energy Intake Time: x1 day   Weight Loss %: 10% loss or greater    Weight loss Time: Greater than or equal to 3 months   Body Fat Loss: Unable to Assess   Body Fat Location: Unable to assess    Body Muscle Loss: Unable to Assess   Body Muscle Loss Location: Unable to assess    Fluid Accumulation: No significant    Fluid Accumulation Location: No significant     Strength: Not Performed;  Not Measured     NUTRITION DIAGNOSIS   Problem: Problem #1: Underweight  Etiology: Insufficient energy/nutrient consumption  Signs & Symptoms: Intake 0-25% and Weight loss     NUTRITION INTERVENTION  Food and/or Nutrient Delivery:Modify current diet  or Start ONS   Nutrition education/counseling/coordination of care: Continue Inpatient Monitoring     NUTRITION MONITORING & EVALUATION:  Evaluation:Goals set   Goals:Goals: Pt will consume >/=50% of meals and ONS this admission   Monitoring: Meal Intake , Pertinent Labs , Supplement Intake  or Weight      OBJECTIVE DATA:  · Nutrition-Focused Physical Findings: no edema, Bm 8/6  · Wounds None      Past Medical History:   Diagnosis Date    Allergic rhinitis     Anemia     CAD (coronary artery disease)     CAD (coronary artery disease)     Diverticulosis     ESRD (end stage renal disease) on dialysis ([facility] 4/24/2020    GERD (gastroesophageal reflux disease)     History of colonoscopy 2002    Hyperlipidemia     Hypertension     Myocardial infarction, inferior wall ([facility] January 2006        ANTHROPOMETRICS  Current Height: 5' 5\" (165.1 cm)  Current Weight: 110 lb 3.7 oz (50 kg)    Admission weight: 110 lb 3.7 oz (50 kg)  Ideal Bodyweight 125 lb    Usual Bodyweight ~130 lb    Weight Changes  -16%, 21 lb x 3 months       BMI BMI (Calculated): 18.4    Wt Readings from Last 50 Encounters:   08/06/20 110 lb 3.7 oz (50 kg)   07/28/20 114 lb 9.6 oz (52 kg)   06/03/20 129 lb (58.5 kg)   05/26/20 131 lb 6.4 oz (59.6 kg)   04/07/20 120 lb 5.9 oz (54.6 kg)   03/30/20 127 lb (57.6 kg)     COMPARATIVE STANDARDS  Estimated Total Kcals/Day : 30-35 Current Bodyweight (50 kg) 5139-5179 kcal    Estimated Total Protein (g/day) : 1.5-2.0 Current Bodyweight (50 kg)  g/day  Estimated Daily Total Fluid (ml/day): 1 mL/kcal per day     Food / Nutrition-Related History  Pre-Admission / Home Diet:  Pre-Admission/Home Diet: General   Home Supplements / Herbals:    none noted  Food Restrictions / Cultural Requests:    none noted    Diet Orders / Intake / Nutrition Support  Current diet/supplement order: DIET RENAL;     NSG Recorded PO:   PO Fluids P.O.: 120 mL  PO Meals PO Meals Eaten (%): 1 - 25%   PO Intake: 1-25%  PO Supplement: None   PO Supplement Intake: None   IVF: 125 ml/hr     NUTRITION RISK LEVEL: Risk Level: High     Jessi Vidal RD, LD  [place]:  [phone]  Office: [phone]

## 2020-08-06 NOTE — PROGRESS NOTES
Patient called me today that she has not made urine for 24 hours. Patient feels that her bladder is full. Patient was told to go to the ER. De Leon is placed in the ER and she has only made 100 cc of urine. Patient has history of myeloma kidney and was on dialysis. Her kidney function did get better with the chemo. She was off dialysis. Patient has new onset WANDER. I have discussed the case with Dr. Kashmir Brooks.   She will be admitt ed to medicine team.

## 2020-08-06 NOTE — PLAN OF CARE
Problem: Falls - Risk of:  Goal: Will remain free from falls  Description: Will remain free from falls  Outcome: Ongoing  Note: Pt is at risk for falls. Fall precautions in place. Call light in reach, bed alarm is on, bed locked and in the lowest position. Will continue to monitor.

## 2020-08-06 NOTE — PROGRESS NOTES
Internal Medicine Progress Note PGY-1    Admit Date: 8/5/2020    CC: Unable to make urine    Interval history: Ms. Randy Boss had no complaints this morning. She has been urinating well through her chadwick catheter with up to 1.6 L of urine produced since admission. She denies SOB, CP, N/V. Medications:     Scheduled Meds:   amLODIPine  5 mg Oral Daily    atorvastatin  40 mg Oral Nightly    metoprolol tartrate  25 mg Oral BID    sodium chloride flush  10 mL Intravenous 2 times per day     Continuous Infusions:   sodium chloride 125 mL/hr at 08/06/20 1000    dextrose       PRN Meds:sodium chloride flush, acetaminophen **OR** acetaminophen, promethazine **OR** ondansetron, glucose, dextrose, glucagon (rDNA), dextrose    Objective:     Vitals:   T-max:  Patient Vitals for the past 8 hrs:   BP Temp Temp src Pulse Resp SpO2   08/06/20 0954 (!) 146/73 -- -- 94 -- --   08/06/20 0732 (!) 154/76 98.6 °F (37 °C) Oral 83 18 98 %   08/06/20 0431 (!) 155/84 98.4 °F (36.9 °C) Oral 83 18 94 %       Intake/Output Summary (Last 24 hours) at 8/6/2020 1043  Last data filed at 8/6/2020 0958  Gross per 24 hour   Intake 2401.17 ml   Output 2300 ml   Net 101.17 ml     Physical Exam   Constitutional: Patient is oriented to person, place, and time. Appears well-developed and well-nourished. No distress. HENT: Head, normocephalic and atraumatic. External ears normal.   Mouth/Throat: Oropharynx is clear and moist. No exudate or erythema  Eyes: Pupils are equal, round, and reactive to light. Conjunctivae and EOM are normal.   Cardiovascular: Regular rate and rythem with normal heart sounds and intact distal pulses. No murmurs, rubs, or gallops  Pulmonary/Chest: Effort normal and breath sounds normal. No respiratory distress. No wheezes, rhonchi, or rales   Abdominal: Soft, non tender, non distended with normal bowel sounds. No rebound or guarding.    Musculoskeletal: Normal range of motion with no extremity edema      LABS:    CBC:   Recent Labs     08/05/20 1938   WBC 6.0   HGB 9.8*   HCT 30.9*      MCV 93.2     Renal:    Recent Labs     08/05/20  1938 08/06/20  0301 08/06/20  0636   * 130*  --    K 5.7* 3.8 3.9   CL 98* 100  --    CO2 17* 18*  --    BUN 26* 26*  --    CREATININE 2.9* 3.0*  --    GLUCOSE 118* 214*  --    CALCIUM 9.6 9.6  --    ANIONGAP 11 12  --      Hepatic: No results for input(s): AST, ALT, BILITOT, BILIDIR, PROT, LABALBU, ALKPHOS in the last 72 hours. Troponin: No results for input(s): TROPONINI in the last 72 hours. BNP: No results for input(s): BNP in the last 72 hours. Lipids: No results for input(s): CHOL, HDL in the last 72 hours. Invalid input(s): LDLCALCU, TRIGLYCERIDE  ABGs:  No results for input(s): PHART, XNG1PIR, PO2ART, RDF3IGJ, BEART, THGBART, H6FJKXSJ, TYM4TFE in the last 72 hours. INR: No results for input(s): INR in the last 72 hours. Lactate: No results for input(s): LACTATE in the last 72 hours. Cultures:  -----------------------------------------------------------------  RAD:   No orders to display         Assessment/Plan:   65 y.o. female with PMHx of Multiple myeloma, ESRD not on dialysis, HTN, CVA, Porcelain gall bladder who presented to [facility] for un able to make urine of 1 day duration. WANDER on CKD  Pt has hx of ESRD, s/p dialysis, p/w un uria,  Cr 2.9, K 5.7. K has improved after lasix and fluids to 3.8 this morning. She has produced ample urine the past 24 hours. Patient appears to have been fluid down causing lack of urination and increased Cr.   -De Leon catheter  -IVF  -Daily BMP  -Nephrology consult  -CT w/o contrast of abdomen pelvis    Hyperkalemia  K 5.7 from 2.7(10 days ago) in setting of Un uria and WANDER. Patient K improved to 3.9 this morning. -IVF  -Discontinue lasix  -Daily BMP     Hyponatremia:  Na 126 , from 139  Improved to 130 this morning.  Urine osmo low at 268, urine K 7.7, and urine chloride 118.  -Urine Cr, Urine Na pending   -IVF   -Daily BMP     Multiple myloma  Hx of multiple myloma, no sign of infection, WANDER on ckd  -we will hold Acyclovir in setting of WANDER on ESRD, hyperkalemia  -Consult oncology   -stable  -CBC     Code Status:Full Code  FEN: DIET RENAL;  PPX:  Lovenox  DISPO: GMANA Da Silva, PGY-1  08/06/20  10:43 AM    This patient will be staffed and discussed with Kingsley Valentien MD.

## 2020-08-06 NOTE — PROGRESS NOTES
4 Eyes Admission Assessment     I agree as the admission nurse that 2 RN's have performed a thorough Head to Toe Skin Assessment on the patient. ALL assessment sites listed below have been assessed on admission. Areas assessed by both nurses:   [x]   Head, Face, and Ears   [x]   Shoulders, Back, and Chest  [x]   Arms, Elbows, and Hands   [x]   Coccyx, Sacrum, and Ischum  [x]   Legs, Feet, and Heels        Does the Patient have Skin Breakdown?   No         Niall Prevention initiated:  NA   Wound Care Orders initiated:  NA      WOC nurse consulted for Pressure Injury (Stage 3,4, Unstageable, DTI, NWPT, and Complex wounds):  NA      Nurse 1 eSignature: Electronically signed by Bang Faustin RN on 8/6/20 at 2:10 AM EDT    **SHARE this note so that the co-signing nurse is able to place an eSignature**    Nurse 2 eSignature: Electronically signed by Enrique Clark RN on 8/6/20 at 5:18 AM EDT

## 2020-08-06 NOTE — PLAN OF CARE
Nutrition Problem #1: Underweight  Intervention: Food and/or Nutrient Delivery: Modify Current Diet, Start Oral Nutrition Supplement  Nutritional Goals: Pt will consume >/=50% of meals and ONS this admission

## 2020-08-06 NOTE — DISCHARGE SUMMARY
Hospital Medicine Discharge Summary    Patient ID: Arnulfo Olmos   Gender: female  : 1950   Age: 65 y.o. MRN: [id number]  Code Status: Full Code    Patient's PCP: Edelmira Watkins MD    Admit Date: 2020     Discharge Date:   2020    Admitting Physician: Edelmira Watkins MD     Discharge Physician: Bola Cao MD     Discharge Diagnoses:  WANDER  Hypercalcemia  Multiple Myeloma     Active Hospital Problems    Diagnosis Date Noted    Acute kidney injury superimposed on chronic kidney disease ([facility] [address] [N17.9, N18.9] 2020    Acute renal failure ([facility] [address] [N17.9]        The patient was seen and examined on day of discharge and this discharge summary is in conjunction with any daily progress note from day of discharge. Hospital Course:   65 y.o. female with PMHx of Multiple myeloma, ESRD not on dialysis, HTN, CVA, Porcelain gall bladder who presented to [facility] for un able to make urine of 1 day duration. She was found to have WANDER (cr 2.7), hyperkalemia (5.7), and hyponatremia (127). The patient was given lasix, glucose, insulin, and fluids. Her K and Na improved with those measures to 3.9 and 130 respectively the next morning. She was also producing ample urine through the chadwick catheter. CT w/o contrast demonstrated no acute findings or evidence of obstruction but did show two new small aneurysms that were evaluated by vascular surgery and recommended outpatient US and follow up. CT also demonstrated new liver lesion that was recommended for biopsy by oncology which was performed on  without incident and pathology showed MM recurrence. On  she began two cycles of Daratumumab for progression due to her new kidney injury and MM recurrence in the liver. The treatment overall was tolerated well. At the time of discharge her Cr had improved to 1.1 with normal sodium and potassium and was creating urine.      Her WANDER was originally thought to be 2/2 to dehydration but the Cr only slowly improved with fluids. Myeloma nephropathy was worked up with SPEP, UPEP, and serum free light chains indicating active disease with Lambda 878 and kappa 9.47. SPEP(low total protein and albumin, increased beta globulin 1.9 and decreased gamma globulin . 3) UPEP. After gentle fluids throughout her stay her creatinine was at baseline before discharge. She had hypercalcemia to 11.7 and was started on pamidronate x 1 - 8/10 and also received two days of calcitonin and four days of dexamethasone resolving her hypercalcemia. Disposition:  Home    Physical Exam Performed:     BP (!) 146/73   Pulse 94   Temp 98.6 °F (37 °C) (Oral)   Resp 18   Ht 5' 5\" (1.651 m)   Wt 110 lb 3.7 oz (50 kg)   SpO2 98%   BMI 18.34 kg/m²       Physical Exam   Constitutional: Patient is oriented to person, place, and time. Appears well-developed and well-nourished. HENT: Head, normocephalic and atraumatic. External ears normal.   Cardiovascular: Regular rate and rythem with normal heart sounds and intact distal pulses. No murmurs, rubs, or gallops  Pulmonary/Chest: Effort normal and breath sounds normal. No respiratory distress. No wheezes, rhonchi, or rales   Abdominal: Soft, non tender, non distended with normal bowel sounds. No rebound or guarding. Musculoskeletal: Normal range of motion with no extremity edema      Labs:  For convenience and continuity at follow-up the following most recent labs are provided:      CBC:    Lab Results   Component Value Date    WBC 6.0 08/05/2020    HGB 9.8 08/05/2020    HCT 30.9 08/05/2020     08/05/2020       Renal:    Lab Results   Component Value Date     08/06/2020    K 3.9 08/06/2020     08/06/2020    CO2 18 08/06/2020    BUN 26 08/06/2020    CREATININE 3.0 08/06/2020    CALCIUM 9.6 08/06/2020    PHOS 3.3 07/28/2020         Significant Diagnostic Studies    Radiology:   No orders to display          Consults:     IP CONSULT TO HOSPITALIST  IP CONSULT TO NEPHROLOGY  IP CONSULT TO ONCOLOGY    Disposition:  Home     Condition at Discharge: Stable    Discharge Instructions/Follow-up:      You were admitted since you were not creating urine at home and you were found to have a kidney injury with an increased creatinine. We believe this was caused by a combination of dehydration and the myeloma effects on your kidney. Your kidney number improved and you were creating urine before you were discharged. On CT scan we saw a new liver lesion which we biopsied to figure out what was causing it and found it was your multiple myeloma. You have a follow up appointment scheduled with Dr. Caroline Brush 8/20 0915 next week . Vascular Surgery  - Please follow up with Dr. Jessica Mead in 6 months. Please call her office at [phone] to schedule your follow up appointment to discuss your abdominal aortic aneurysm. Please obtain duplex ultrasound prior to appointment with Dr. Jessica Mead. General Surgery  - Please follow up with Dr. Priyank Vickers to discuss removal of your gallbladder. Please call his office at [phone] to schedule your follow up appointment.      Code Status:  Full Code     Activity: activity as tolerated    Diet: renal diet      Discharge Medications:     Current Discharge Medication List           Details   amLODIPine (NORVASC) 5 MG tablet Take 1 tablet by mouth once daily  Qty: 90 tablet, Refills: 0      potassium chloride (KLOR-CON M) 20 MEQ extended release tablet Take 1 tablet by mouth 2 times daily  Qty: 60 tablet, Refills: 5      calcium carbonate (OSCAL) 500 MG TABS tablet Take 500 mg by mouth daily      acyclovir (ZOVIRAX) 400 MG tablet 1 tablet 2 times daily      metoprolol tartrate (LOPRESSOR) 25 MG tablet Take 1 tablet by mouth twice daily  Qty: 180 tablet, Refills: 0      atorvastatin (LIPITOR) 40 MG tablet Take 1 tablet by mouth nightly  Qty: 90 tablet, Refills: 3      acetaminophen (TYLENOL) 325 MG tablet Take 650 mg by mouth every 6 hours as needed for Pain      ondansetron (ZOFRAN) 8 MG tablet TAKE 1 TABLET BY MOUTH EVERY 8 HOURS AS NEEDED FOR NAUSEA AND VOMITING      pantoprazole (PROTONIX) 40 MG tablet TAKE 1 TABLET BY MOUTH ONCE DAILY IN THE MORNING BEFORE BREAKFAST  Qty: 90 tablet, Refills: 0      Cholecalciferol (VITAMIN D) 50 MCG (2000 UT) CAPS capsule Take by mouth      dexamethasone (DECADRON) 2 MG tablet Take 5 tablets by mouth See Admin Instructions Take 5 tablets (10 mg) once per day, on Wed 4/8 & Sat 4/11 (2 doses in total). Qty: 10 tablet, Refills: 0             Time Spent on discharge is more than 30 minutes in the examination, evaluation, counseling and review of medications and discharge plan. Signed:    Estuardo Jhaveri MD   8/6/2020      Thank you Markos Nieto MD for the opportunity to be involved in this patient's care. If you have any questions or concerns please feel free to contact me.

## 2020-08-06 NOTE — PLAN OF CARE
Problem: Fluid Volume:  Goal: Ability to achieve a balanced intake and output will improve  Description: Ability to achieve a balanced intake and output will improve  Outcome: Ongoing  Note: Patient maintaining adequate intake and output. Pt's potassium checked every 6 hours. Most recent potassium level 3.6.        Problem: Physical Regulation:  Goal: Ability to maintain clinical measurements within normal limits will improve  Description: Ability to maintain clinical measurements within normal limits will improve  Outcome: Ongoing     Problem: Physical Regulation:  Goal: Will show no signs and symptoms of electrolyte imbalance  Description: Will show no signs and symptoms of electrolyte imbalance  Outcome: Ongoing

## 2020-08-07 LAB
ALBUMIN SERPL-MCNC: 3 G/DL (ref 3.4–5)
ANION GAP SERPL CALCULATED.3IONS-SCNC: 14 MMOL/L (ref 3–16)
BASOPHILS ABSOLUTE: 0 K/UL (ref 0–0.2)
BASOPHILS RELATIVE PERCENT: 0.6 %
BUN BLDV-MCNC: 18 MG/DL (ref 7–20)
CALCIUM SERPL-MCNC: 9.8 MG/DL (ref 8.3–10.6)
CHLORIDE BLD-SCNC: 104 MMOL/L (ref 99–110)
CO2: 18 MMOL/L (ref 21–32)
CREAT SERPL-MCNC: 2.3 MG/DL (ref 0.6–1.2)
EOSINOPHILS ABSOLUTE: 0.1 K/UL (ref 0–0.6)
EOSINOPHILS RELATIVE PERCENT: 1.4 %
GFR AFRICAN AMERICAN: 25
GFR NON-AFRICAN AMERICAN: 21
GLUCOSE BLD-MCNC: 90 MG/DL (ref 70–99)
HCT VFR BLD CALC: 29.1 % (ref 36–48)
HEMOGLOBIN: 9.6 G/DL (ref 12–16)
LYMPHOCYTES ABSOLUTE: 0.6 K/UL (ref 1–5.1)
LYMPHOCYTES RELATIVE PERCENT: 13.5 %
MAGNESIUM: 2.3 MG/DL (ref 1.8–2.4)
MAGNESIUM: 2.6 MG/DL (ref 1.8–2.4)
MCH RBC QN AUTO: 29.2 PG (ref 26–34)
MCHC RBC AUTO-ENTMCNC: 33 G/DL (ref 31–36)
MCV RBC AUTO: 88.7 FL (ref 80–100)
MONOCYTES ABSOLUTE: 0.6 K/UL (ref 0–1.3)
MONOCYTES RELATIVE PERCENT: 14.4 %
NEUTROPHILS ABSOLUTE: 3 K/UL (ref 1.7–7.7)
NEUTROPHILS RELATIVE PERCENT: 70.1 %
PDW BLD-RTO: 19.6 % (ref 12.4–15.4)
PHOSPHORUS: 3.3 MG/DL (ref 2.5–4.9)
PLATELET # BLD: 181 K/UL (ref 135–450)
PMV BLD AUTO: 8.3 FL (ref 5–10.5)
POTASSIUM REFLEX MAGNESIUM: 3.4 MMOL/L (ref 3.5–5.1)
POTASSIUM REFLEX MAGNESIUM: 3.7 MMOL/L (ref 3.5–5.1)
POTASSIUM SERPL-SCNC: 3.8 MMOL/L (ref 3.5–5.1)
POTASSIUM SERPL-SCNC: 3.9 MMOL/L (ref 3.5–5.1)
RBC # BLD: 3.28 M/UL (ref 4–5.2)
SODIUM BLD-SCNC: 136 MMOL/L (ref 136–145)
WBC # BLD: 4.2 K/UL (ref 4–11)

## 2020-08-07 PROCEDURE — 36415 COLL VENOUS BLD VENIPUNCTURE: CPT

## 2020-08-07 PROCEDURE — 2060000000 HC ICU INTERMEDIATE R&B

## 2020-08-07 PROCEDURE — 99232 SBSQ HOSP IP/OBS MODERATE 35: CPT | Performed by: INTERNAL MEDICINE

## 2020-08-07 PROCEDURE — 2580000003 HC RX 258: Performed by: STUDENT IN AN ORGANIZED HEALTH CARE EDUCATION/TRAINING PROGRAM

## 2020-08-07 PROCEDURE — 83735 ASSAY OF MAGNESIUM: CPT

## 2020-08-07 PROCEDURE — 6370000000 HC RX 637 (ALT 250 FOR IP): Performed by: STUDENT IN AN ORGANIZED HEALTH CARE EDUCATION/TRAINING PROGRAM

## 2020-08-07 PROCEDURE — 84132 ASSAY OF SERUM POTASSIUM: CPT

## 2020-08-07 PROCEDURE — 6360000002 HC RX W HCPCS: Performed by: STUDENT IN AN ORGANIZED HEALTH CARE EDUCATION/TRAINING PROGRAM

## 2020-08-07 PROCEDURE — 2580000003 HC RX 258: Performed by: INTERNAL MEDICINE

## 2020-08-07 PROCEDURE — 6360000002 HC RX W HCPCS: Performed by: INTERNAL MEDICINE

## 2020-08-07 PROCEDURE — 80069 RENAL FUNCTION PANEL: CPT

## 2020-08-07 PROCEDURE — 99222 1ST HOSP IP/OBS MODERATE 55: CPT | Performed by: SURGERY

## 2020-08-07 PROCEDURE — 85025 COMPLETE CBC W/AUTO DIFF WBC: CPT

## 2020-08-07 RX ORDER — HEPARIN SODIUM 5000 [USP'U]/ML
5000 INJECTION, SOLUTION INTRAVENOUS; SUBCUTANEOUS EVERY 8 HOURS
Status: DISCONTINUED | OUTPATIENT
Start: 2020-08-07 | End: 2020-08-14 | Stop reason: HOSPADM

## 2020-08-07 RX ORDER — MAGNESIUM SULFATE IN WATER 40 MG/ML
2 INJECTION, SOLUTION INTRAVENOUS ONCE
Status: DISCONTINUED | OUTPATIENT
Start: 2020-08-07 | End: 2020-08-07

## 2020-08-07 RX ADMIN — HEPARIN SODIUM 5000 UNITS: 5000 INJECTION INTRAVENOUS; SUBCUTANEOUS at 20:40

## 2020-08-07 RX ADMIN — ATORVASTATIN CALCIUM 40 MG: 40 TABLET, FILM COATED ORAL at 20:40

## 2020-08-07 RX ADMIN — HEPARIN SODIUM 5000 UNITS: 5000 INJECTION INTRAVENOUS; SUBCUTANEOUS at 13:29

## 2020-08-07 RX ADMIN — Medication 10 ML: at 20:59

## 2020-08-07 RX ADMIN — AMLODIPINE BESYLATE 5 MG: 5 TABLET ORAL at 10:37

## 2020-08-07 RX ADMIN — METOPROLOL TARTRATE 25 MG: 25 TABLET, FILM COATED ORAL at 10:37

## 2020-08-07 RX ADMIN — MAGNESIUM SULFATE HEPTAHYDRATE: 500 INJECTION, SOLUTION INTRAMUSCULAR; INTRAVENOUS at 01:53

## 2020-08-07 RX ADMIN — METOPROLOL TARTRATE 25 MG: 25 TABLET, FILM COATED ORAL at 20:40

## 2020-08-07 RX ADMIN — SODIUM CHLORIDE: 9 INJECTION, SOLUTION INTRAVENOUS at 10:39

## 2020-08-07 RX ADMIN — Medication 10 ML: at 10:39

## 2020-08-07 ASSESSMENT — PAIN SCALES - GENERAL
PAINLEVEL_OUTOF10: 0

## 2020-08-07 NOTE — PLAN OF CARE
Problem: Falls - Risk of:  Goal: Will remain free from falls  Description: Will remain free from falls  8/7/2020 0646 by Nathan Heredia RN  Outcome: Ongoing  Note: Pt is free from falls at this time. Bed is in lowest position, wheels are locked and bed alarm is set. Call light and belongings are within reach. Visual fall sign/blanket are posted. Will continue to monitor. Problem: Falls - Risk of:  Goal: Absence of physical injury  Description: Absence of physical injury  Outcome: Ongoing  Note: Pt is free from accidental physical injury at this time. Pt is AxOx4. Fall Risk assessed per shift. ID band in place. Bed in lowest position, wheels locked and alarm is set. Call light and belongings are within reach. Maintaining a safe environment. Will continue to assess and monitor. Problem: Nutrition  Goal: Optimal nutrition therapy  Outcome: Ongoing  Note: Pt has poor PO intake. Pt encouraged to take frequent small meals. Pt provided choice of foods that they like. Will continue to assess and monitor.

## 2020-08-07 NOTE — PROGRESS NOTES
Nephrology Consult Note                                                                                                                                                                                                                                                                                                                                                               Office : [phone]     Fax :[phone]              Patient's Name: Gracy Sousa  8:59 AM  8/7/2020    Reason for Consult:  WANDER on CKD  Requesting Physician:  Jacklyn Pond      Chief Complaint:  Urinary retention    History of Present Ilness:    Gracy Sousa is a 65 y.o. female with Multiple myeloma, CKD 3 not on dialysis, HTN, CVA, Porcelain gall bladder who presents with chief complaint of urinary retention. Patient reports that in the past 24 hours she has not urinated. Pt has multiple Myeloma on A acyclovir, PPI. Yesterday she noticed that her diaper is dry and she did not void all day. This is abnormal for her and has never happened in the past.  She denies starting any new medications. She does have urinary urgency. There is no associated abdominal pain. She denies any fever or chills. She does have good p.o. intake today as she has drinking approximately 36 ounces of fluids. Yesterday she voided approximately 4-5 times. She recently had a tunneled dialysis catheter placed which is now removed. During that time she was still voiding well. She denies any lower extremity swelling. De Leon is placed in the ER and she has only made 100 cc of urine. Patient has history of myeloma kidney and was on dialysis. Her kidney function did get better with the chemo. She was off dialysis.     Patient has new onset WANDER. 8/07:   No acute events overnight. Making good urine. CTPA with evidence of PE yesterday. Pt denies shortness of breath, chest pain, or abdominal pain.       Review of Systems:      Constitutional: Negative for chills or fevers. Respiratory: Negative for cough and shortness of breath. Cardiovascular: Negative for chest pain. Gastrointestinal: Negative for abdominal pain, diarrhea, nausea and vomiting. Genitourinary: Positive for decreased urine volume. 14 point ROS obtained but were negative except mentioned in HPI. Past Medical History:   Diagnosis Date    Allergic rhinitis     Anemia     CAD (coronary artery disease)     CAD (coronary artery disease)     Diverticulosis     ESRD (end stage renal disease) on dialysis ([facility] [address] 2020    GERD (gastroesophageal reflux disease)     History of colonoscopy     Hyperlipidemia     Hypertension     Myocardial infarction, inferior wall [facility]) 2006       Past Surgical History:   Procedure Laterality Date    BREAST REDUCTION SURGERY  Bilat     SECTION  x3    CT BONE MARROW BIOPSY  2020    CT BONE MARROW BIOPSY 2020 [facility] CT SCAN    HYSTERECTOMY      TYMPANOPLASTY         Family History   Problem Relation Age of Onset    Heart Disease Mother     High Cholesterol Mother     Heart Disease Sister         hypertension    Diabetes Sister         hypertension    High Cholesterol Sister         hypertension    Heart Disease Brother     High Cholesterol Brother     Stroke Brother         hypertension        reports that she has been smoking. She has a 22.50 pack-year smoking history. She has never used smokeless tobacco. She reports that she does not drink alcohol or use drugs. Allergies:  Patient has no known allergies.     Current Medications:    amLODIPine (NORVASC) tablet 5 mg, Daily  atorvastatin (LIPITOR) tablet 40 mg, Nightly  metoprolol tartrate (LOPRESSOR) tablet 25 mg, BID  sodium chloride flush 0.9 % injection 10 mL, 2 times per day  sodium chloride flush 0.9 % injection 10 mL, PRN  acetaminophen (TYLENOL) tablet 650 mg, Q6H PRN    Or  acetaminophen (TYLENOL) suppository 650 mg, Q6H PRN  promethazine (PHENERGAN) tablet 12.5 mg, Q6H PRN    Or  ondansetron (ZOFRAN) injection 4 mg, Q6H PRN  0.9 % sodium chloride infusion, Continuous  glucose (GLUTOSE) 40 % oral gel 15 g, PRN  dextrose 50 % IV solution, PRN  glucagon (rDNA) injection 1 mg, PRN  dextrose 5 % solution, PRN            Physical exam:     Vitals:  /75   Pulse 78   Temp 98.5 °F (36.9 °C) (Oral)   Resp 16   Ht 5' 5\" (1.651 m)   Wt 110 lb 3.7 oz (50 kg)   SpO2 100%   BMI 18.34 kg/m²     Constitutional:  OAA X3 NAD  Skin: no rash, turgor wnl  Heent:  eomi, mmm  Neck: no bruits or jvd noted  Cardiovascular:  S1, S2 without m/r/g  Respiratory: CTA B without w/r/r  Abdomen:  +bs, soft, nt, nd  Ext: No lower extremity edema. Psychiatric: mood and affect appropriate      Data:   Labs:  CBC:   Recent Labs     08/05/20 1938 08/07/20  0556   WBC 6.0 4.2   HGB 9.8* 9.6*    181     BMP:    Recent Labs     08/05/20 1938 08/06/20 0301 08/06/20 1804 08/07/20 0156 08/07/20  0556   * 130*  --   --   --  136   K 5.7* 3.8   < > 3.4* 3.9 3.8   CL 98* 100  --   --   --  104   CO2 17* 18*  --   --   --  18*   BUN 26* 26*  --   --   --  18   CREATININE 2.9* 3.0*  --   --   --  2.3*   GLUCOSE 118* 214*  --   --   --  90    < > = values in this interval not displayed. Ca/Mg/Phos:   Recent Labs     08/05/20 1938 08/06/20 0301 08/06/20 1804 08/07/20 0156 08/07/20  0556   CALCIUM 9.6 9.6  --   --  9.8   MG  --   --  0.70* 2.60*  --    PHOS  --   --   --   --  3.3     Hepatic: No results for input(s): AST, ALT, ALB, BILITOT, ALKPHOS in the last 72 hours. Troponin: No results for input(s): TROPONINI in the last 72 hours. BNP: No results for input(s): BNP in the last 72 hours. Lipids: No results for input(s): CHOL, TRIG, HDL, LDLCALC, LABVLDL in the last 72 hours. ABGs: No results for input(s): PHART, PO2ART, OBH1UGP in the last 72 hours. INR: No results for input(s): INR in the last 72 hours.   UA:  Recent Labs     08/05/20  1938   COLORU Yellow CLARITYU Clear   GLUCOSEU Negative   BILIRUBINUR Negative   KETUA Negative   SPECGRAV 1.010   BLOODU Negative   PHUR 7.0   PROTEINU TRACE*   UROBILINOGEN 0.2   NITRU Negative   LEUKOCYTESUR SMALL*   LABMICR YES   URINETYPE Voided      Urine Microscopic:   Recent Labs     08/05/20  1938   BACTERIA 1+*   WBCUA 3-5   RBCUA None seen   EPIU 6-10*     Urine Culture: No results for input(s): LABURIN in the last 72 hours. Urine Chemistry:   Recent Labs     08/06/20  0420 08/06/20  1125   CLUR 118  --    LABCREA  --  20.3*   PROTEINUR  --  30.10*   NAUR  --  122             IMAGING:  CT ABDOMEN PELVIS WO CONTRAST Additional Contrast? None   Final Result      1. Development of hypodense lesion within liver likely relates to primary or metastatic neoplasm reduced since 2017 examination. .      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 Clinical correlation suggested. 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 Vascular consult may be useful if indicated. Marked colonic diverticulosis without diverticulitis. No hydronephrosis      No change in left adrenal nodule      Marked lytic lesions noted consistent with patient's myeloma involving the bilateral ribs. Lower thoracic spine, lumbar spine, pelvis, sacrum and hips and proximal femurs with lytic lesion involving the left iliac bone with pathologic fracture with gap    involving the left iliac bone present previously. Assessment/Plan   Jayla Russell is a 65 y.o. female with Multiple myeloma, CKD 3 not on dialysis, HTN, CVA, Porcelain gall bladder who presents with chief complaint of urinary retention.          WANDER on CKD 3  - suspect due to possible worsening of multiple myeloma - hx of myeloma kidney  - Cr increased to 3.0 from 1.1 in 7/2020 (range is 1.1 - 12.0)  - no anion gap  - UA positive for: leukocyte esterase (small), protein (trace), epithelial cells, bact 1+  - good urinary output overnight  - monitor I/O  - prot/Cr ratio 1.48  - Cr better with IVF   - Very High risk for WANDER as her light chains are tubulotoxic   - She needed RRT sec to Myeloma kidney which recovered   - D/w Pt and primary team       Hyponatremia - now resolved  - monitor Na, f/u RFP  - supplement as needed      Hyperkalemia - now resolving  - K 5.7 on admission  - kayexalate 15g PO x1  - monitor K, f/u RFP  - supplement as needed      HTN  - /67 on admission  - manage as per primary care team  - on amlodipine, lopressor       Anemia  - Hgb 9.8 on admission --> 9.6 - at pt's baseline  - hx multiple myeloma  - monitor H/H  - hematology/oncology team following, appreciate involvement            I will discuss patient and findings with my attending Dr. Russell Horner MD.     Melanie Haddad MD  PGY-1        Thank you for allowing us to participate in care of Amor Rae free to contact  [facility] [id number]  Office : [phone]  Fax :[phone]      Agree with plan above     Chio Villalta MD

## 2020-08-07 NOTE — CONSULTS
Vascular Surgery   Resident Consult Note    Reason for Consult: AAA and right common iliac artery aneurysm    History of Present Illness:   Maria Fernanda Goodson is a 65 y.o. female with Hx of chronic kidney disease, multiple myeloma post treatment, HTN, CVA, porcelain gallbladder who presented to [facility] due to decreased urinary output. CT was performed to evaluate for obstruction due to decreased urinary output. Incidental findings were noted for distal aorta aneurysm present measuring 3.0x2.8cm along with right common iliac artery aneurysm measuring 2.5x2.4cm. Previous imaging in 2017 demonstrated abdominal aorta aneurysm present measuring 2.5cm x 2.4cm with proximal right iliac artery measuring 1.7cm with periaortic inflamation that was investigated at that time with WBC scan with no signs of infection. Patient admits to leg pain with no claudication symptoms. She states this has been present for years and has not changed. She denies any syncopal episodes, groin pain, leg swelling, changes in coloring of lower extremities, denies bloody bowel movements or pain with eating. Past Medical History:         Diagnosis Date    Allergic rhinitis     Anemia     CAD (coronary artery disease)     CAD (coronary artery disease)     Diverticulosis     ESRD (end stage renal disease) on dialysis ([facility] [address] 2020    GERD (gastroesophageal reflux disease)     History of colonoscopy     Hyperlipidemia     Hypertension     Myocardial infarction, inferior wall [facility]) 2006       Past Surgical History:        Procedure Laterality Date    BREAST REDUCTION SURGERY  Bilat     SECTION  x3    CT BONE MARROW BIOPSY  2020    CT BONE MARROW BIOPSY 2020 [facility] CT SCAN    HYSTERECTOMY      TYMPANOPLASTY         Allergies:  Patient has no known allergies. Medications:   Home Meds  No current facility-administered medications on file prior to encounter.       Current Outpatient Medications on File Prior to Disease Sister         hypertension    Diabetes Sister         hypertension    High Cholesterol Sister         hypertension    Heart Disease Brother     High Cholesterol Brother     Stroke Brother         hypertension       Social History:   TOBACCO:   reports that she has been smoking. She has a 22.50 pack-year smoking history. She has never used smokeless tobacco.  ETOH:   reports no history of alcohol use. DRUGS:   reports no history of drug use. Review of Systems:     Constitutional: Negative. HENT: Negative. Eyes: Negative. Respiratory: Negative. Cardiovascular: Negative. Gastrointestinal: Negative. Genitourinary: Negative. Musculoskeletal: Negative. Bilateral chronic lower extremity MSK pain  Skin: Negative. Endocrine: Negative. Allergic/Immunologic: Negative. Neurological: Negative. Hematological: Negative. Psychiatric/Behavioral: Negative.     Physical exam:    Vitals:    08/07/20 0148 08/07/20 0510 08/07/20 0922 08/07/20 1037   BP: 132/72 137/75 (!) 159/68 (!) 158/72   Pulse: 82 78 79 76   Resp: 16 16 16    Temp: 98.7 °F (37.1 °C) 98.5 °F (36.9 °C) 98.7 °F (37.1 °C)    TempSrc: Oral Oral Oral    SpO2: 96% 100% 100%    Weight:       Height:           General appearance: alert, no acute distress, grooming appropriate  Eyes: PERRL, no scleral icterus  Neck: trachea midline, no JVD  Chest/Lungs: symmetrical chest rise, normal effort  Cardiovascular: RRR, no murmurs/gallops/rubs  Abdomen: soft, non-tender, non-distended, no guarding/rigidity  Skin: warm and dry, no rashes  Extremities: no edema, no cyanosis  Neuro: A&Ox3, no focal deficits, sensation intact    Pulses    F PT DP   R +/+ +/+ +/+   L +/+ +/+ +/+     Labs:    CBC:   Recent Labs     08/05/20 1938 08/07/20  0556   WBC 6.0 4.2   HGB 9.8* 9.6*   HCT 30.9* 29.1*   MCV 93.2 88.7    181     BMP:   Recent Labs     08/05/20  1938 08/06/20  0301  08/06/20  1804 08/07/20  0156 08/07/20  0556   * 130*  --   --   --  136   K 5. 7* 3.8   < > 3.4* 3.9 3.8   CL 98* 100  --   --   --  104   CO2 17* 18*  --   --   --  18*   PHOS  --   --   --   --   --  3.3   BUN 26* 26*  --   --   --  18   CREATININE 2.9* 3.0*  --   --   --  2.3*    < > = values in this interval not displayed. PT/INR: No results for input(s): PROTIME, INR in the last 72 hours. APTT: No results for input(s): APTT in the last 72 hours. Liver Profile:   Lab Results   Component Value Date    AST 14 04/01/2020    ALT 6 04/01/2020    BILIDIR <0.2 03/30/2020    BILITOT <0.2 04/01/2020    ALKPHOS 64 04/01/2020     Lab Results   Component Value Date    CHOL 127 01/28/2019    HDL 49 01/28/2019    HDL 42 04/09/2012    TRIG 74 01/28/2019     UA:   Lab Results   Component Value Date    NITRITE negative 03/30/2020    COLORU Yellow 08/05/2020    PHUR 7.0 08/05/2020    LABCAST NONE SEEN 06/08/2011    WBCUA 3-5 08/05/2020    RBCUA None seen 08/05/2020    BACTERIA 1+ 08/05/2020    CLARITYU Clear 08/05/2020    SPECGRAV 1.010 08/05/2020    LEUKOCYTESUR SMALL 08/05/2020    UROBILINOGEN 0.2 08/05/2020    BILIRUBINUR Negative 08/05/2020    BILIRUBINUR negative 03/30/2020    BILIRUBINUR NEGATIVE 06/08/2011    BLOODU Negative 08/05/2020    GLUCOSEU Negative 08/05/2020    GLUCOSEU NEGATIVE 06/08/2011       Imaging:   CT ABDOMEN PELVIS WO CONTRAST Additional Contrast? None   Final Result      1. Development of hypodense lesion within liver likely relates to primary or metastatic neoplasm reduced since 2017 examination. .      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 Clinical correlation suggested. 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 Vascular consult may be useful if indicated. Marked colonic diverticulosis without diverticulitis.       No hydronephrosis      No change in left adrenal nodule      Marked lytic lesions noted consistent with patient's myeloma involving the bilateral ribs. Lower thoracic spine, lumbar spine, pelvis, sacrum and hips and proximal femurs with lytic lesion involving the left iliac bone with pathologic fracture with gap    involving the left iliac bone present previously. Assessment/Plan: This is a 65 y.o. female with Hx of with Hx of chronic kidney disease, multiple myeloma post treatment, HTN, CVA, porcelain gallbladder who presented to [place] [address] due to decreased urinary output. CT was performed to evaluate for obstruction due to decreased urinary output. CT demonstrated AAA and right common iliac aneurysm present previously as described above. Given nature of aneurysms and evolution from 2017 no surgical intervention is indicated at this time. Recommend duplex ultrasound in 6 months and follow up with Dr. Avtar Dahl after duplex ultrasound obtained. Follow up instructions will be left in discharge tab. Patient was seen and evaluated with senior resident and care discussed with Dr. Avtar Dahl.     Elizabeth Reis DO  08/07/20  11:15 AM

## 2020-08-07 NOTE — PROGRESS NOTES
Pt very likely has Myeloma kidney   Cr better with IVF     Very High risk for WANDER as her light chains are tubulotoxic     She needed RRT sec to Myeloma kidney which recovered         D/w Pt and primary team     Deloris Macedo MD

## 2020-08-07 NOTE — PROGRESS NOTES
Internal Medicine Progress Note PGY-1    Admit Date: 8/5/2020    CC: Unable to make urine    Interval history: Ms. Divya Oswald had no complaints this morning. She has continued to have adequate urine output. She denies SOB, CP, N/V. Medications:     Scheduled Meds:   amLODIPine  5 mg Oral Daily    atorvastatin  40 mg Oral Nightly    metoprolol tartrate  25 mg Oral BID    sodium chloride flush  10 mL Intravenous 2 times per day     Continuous Infusions:   sodium chloride 125 mL/hr at 08/06/20 1723    dextrose       PRN Meds:sodium chloride flush, acetaminophen **OR** acetaminophen, promethazine **OR** ondansetron, glucose, dextrose, glucagon (rDNA), dextrose    Objective:     Vitals:   T-max:  Patient Vitals for the past 8 hrs:   BP Temp Temp src Pulse Resp SpO2   08/07/20 0510 137/75 98.5 °F (36.9 °C) Oral 78 16 100 %       Intake/Output Summary (Last 24 hours) at 8/7/2020 1003  Last data filed at 8/7/2020 0510  Gross per 24 hour   Intake 813.75 ml   Output 4200 ml   Net -3386.25 ml     Physical Exam   Constitutional: Patient is oriented to person, place, and time. Appears well-developed and well-nourished. No distress. HENT: Head, normocephalic and atraumatic. External ears normal.   Mouth/Throat: Oropharynx is clear and moist. No exudate or erythema  Eyes: Pupils are equal, round, and reactive to light. Conjunctivae and EOM are normal.   Cardiovascular: Regular rate and rythem with normal heart sounds and intact distal pulses. No murmurs, rubs, or gallops  Pulmonary/Chest: Effort normal and breath sounds normal. No respiratory distress. No wheezes, rhonchi, or rales   Abdominal: Soft, non tender, non distended with normal bowel sounds. No rebound or guarding.    Musculoskeletal: Normal range of motion with no extremity edema      LABS:    CBC:   Recent Labs     08/05/20  1938 08/07/20  0556   WBC 6.0 4.2   HGB 9.8* 9.6*   HCT 30.9* 29.1*    181   MCV 93.2 88.7     Renal:    Recent Labs 08/05/20  1938 08/06/20  0301  08/06/20  1804 08/07/20  0156 08/07/20  0556   * 130*  --   --   --  136   K 5.7* 3.8   < > 3.4* 3.9 3.8   CL 98* 100  --   --   --  104   CO2 17* 18*  --   --   --  18*   BUN 26* 26*  --   --   --  18   CREATININE 2.9* 3.0*  --   --   --  2.3*   GLUCOSE 118* 214*  --   --   --  90   CALCIUM 9.6 9.6  --   --   --  9.8   MG  --   --   --  0.70* 2.60*  --    PHOS  --   --   --   --   --  3.3   ANIONGAP 11 12  --   --   --  14    < > = values in this interval not displayed. Hepatic:   Recent Labs     08/07/20  0556   LABALBU 3.0*     Troponin: No results for input(s): TROPONINI in the last 72 hours. BNP: No results for input(s): BNP in the last 72 hours. Lipids: No results for input(s): CHOL, HDL in the last 72 hours. Invalid input(s): LDLCALCU, TRIGLYCERIDE  ABGs:  No results for input(s): PHART, OJG4LLZ, PO2ART, GLO6MUT, BEART, THGBART, K5AUDAMW, JZO3YSO in the last 72 hours. INR: No results for input(s): INR in the last 72 hours. Lactate: No results for input(s): LACTATE in the last 72 hours. Cultures:  -----------------------------------------------------------------  RAD:   CT ABDOMEN PELVIS WO CONTRAST Additional Contrast? None   Final Result      1. Development of hypodense lesion within liver likely relates to primary or metastatic neoplasm reduced since 2017 examination. .      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 Clinical correlation suggested. 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 Vascular consult may be useful if indicated. Marked colonic diverticulosis without diverticulitis. No hydronephrosis      No change in left adrenal nodule      Marked lytic lesions noted consistent with patient's myeloma involving the bilateral ribs.  Lower thoracic spine, lumbar spine, pelvis, sacrum and hips and proximal femurs with lytic lesion involving the left iliac bone with pathologic fracture with gap    involving the left iliac bone present previously. Assessment/Plan:   65 y.o. female with PMHx of Multiple myeloma, ESRD not on dialysis, HTN, CVA, Porcelain gall bladder who presented to [facility]. for un able to make urine of 1 day duration. WANDER on CKD  Pt has hx of ESRD, s/p dialysis, p/w un uria,  Cr 2.9, K 5.7. K has improved after lasix and fluids. . She has produced ample urine since admission. Patient appears to have been fluid down causing lack of urination and increased Cr but Cr did not recover after further fluids. CT AP showed no evidence of obstruction. Concern for myeloma nephropathy. Urea excretion 61%. -De Leon catheter  -IVF  -Daily BMP  -Nephrology consult  -Oncology consult  -Myeloma labs pending (UPEP, SPEP, serum free light chains)      Hyperkalemia  K 5.7 from 2.7(10 days ago) in setting of Un uria and WANDER. Patient K improved after lasix and fluids. -IVF  -Daily BMP     Hyponatremia:  Na 126 , from 139  Improved to 136 this morning. Urine osmo low at 268, urine K 7.7, and urine chloride 118. Urine studies show evidence of intrnisc renal disease with fractional excretion of urea of 61%.    -IVF   -Daily BMP     Multiple myloma  Hx of multiple myloma, no sign of infection, WANDER on ckd  -we will hold Acyclovir in setting of WANDER on ESRD, hyperkalemia  -Consult oncology   -stable  -CBC    New Anuerysm on CTAP  New 34 x 28 mm aneurysm or pseudoaneurysm involving the distal abdominal aorta and 23 x 32 mm involving the right common iliac   -Vascular surgery consult       Code Status:Full Code  FEN: DIET RENAL;  Dietary Nutrition Supplements: Renal Oral Supplement  PPX:  Lovenox  DISPO: [facility]    Kalani Pardo, PGY-1  08/07/20  10:03 AM    This patient will be staffed and discussed with Leigh Robin MD.

## 2020-08-07 NOTE — PLAN OF CARE
Problem: Falls - Risk of:  Goal: Will remain free from falls  Description: Will remain free from falls  8/7/2020 1555 by Jojo Ramirez RN  Outcome: Met This Shift  Note: Patient has alarm on chair and does not get up without assistance. Patient's call light and personal belongings within reach. She tolerated ambulation from bed to chair well, with minimal assistance. Will continue to monitor. Problem: Falls - Risk of:  Goal: Absence of physical injury  Description: Absence of physical injury  8/7/2020 1555 by Jojo Ramirez RN  Outcome: Met This Shift  Note: Patient has remained free from physical injury this shift. She calls for assistance before getting up. Will continue to monitor.

## 2020-08-07 NOTE — CONSULTS
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       Results of restaging:    Clustered plasma cells seen  No inc'ed plasma cells on clot or touch imprint or aspirate  24 hour urine, 50.4 mg  Lambda light chains 1039, serum; up from 5  m protein 0.5, stable        Cr has been 1.1 to 1.4 over the past 2 months in the office    Presented with inability to urinate for 24 hours  Cr 2.9 - treated with fluids and diuretics  CBC stable          Past Medical History:    Past Medical History:   Diagnosis Date    Allergic rhinitis     Anemia     CAD (coronary artery disease)     CAD (coronary artery disease)     Diverticulosis     ESRD (end stage renal disease) on dialysis ([facility] [address] 2020    GERD (gastroesophageal reflux disease)     History of colonoscopy     Hyperlipidemia     Hypertension     Myocardial infarction, inferior wall [facility]) 2006       Past Surgical History:    Past Surgical History:   Procedure Laterality Date    BREAST REDUCTION SURGERY  Bilat     SECTION  x3    CT BONE MARROW BIOPSY  2020    CT BONE MARROW BIOPSY 2020 [facility] CT SCAN    HYSTERECTOMY      TYMPANOPLASTY          Social History:    Social History     Socioeconomic History    Marital status:      Spouse name: Not on file    Number of children: Not on file    Years of education: Not on file    Highest education level: Not on file   Occupational History    Not on file   Social Needs    Financial resource strain: Not on file    Food insecurity     Worry: Not on file     Inability: Not on file    Transportation needs     Medical: Not on file     Non-medical: Not on file   Tobacco Use    Smoking status: Current Every Day Smoker     Packs/day: 0.50     Years: 45.00     Pack years: 22.50    Smokeless tobacco: Never Used    Tobacco comment: 1 pack q 3 days   Substance and Sexual Activity    Alcohol use: No     Alcohol/week: 0.0 standard drinks    Drug use: No    Sexual activity: Never   Lifestyle    Physical activity     Days per week: Not on file     Minutes per session: Not on file    Stress: Not on file   Relationships    Social connections     Talks on phone: Not on file     Gets together: Not on file     Attends Gnosticism service: Not on file     Active member of club or organization: Not on file     Attends meetings of clubs or organizations: Not on file     Relationship status: Not on file    Intimate partner violence     Fear of current or ex partner: Not on file     Emotionally abused: Not on file     Physically abused: Not on file     Forced sexual activity: Not on file   Other Topics Concern    Not on file   Social History Narrative    Not on file        Family History:    Family History   Problem Relation Age of Onset    Heart Disease Mother     High Cholesterol Mother     Heart Disease Sister         hypertension    Diabetes Sister         hypertension    High Cholesterol Sister         hypertension    Heart Disease Brother     High Cholesterol Brother     Stroke Brother         hypertension        Allergies:    No Known Allergies       Medications:    Current Facility-Administered Medications   Medication Dose Route Frequency Provider Last Rate Last Dose    amLODIPine (NORVASC) tablet 5 mg  5 mg Oral Daily Elena Pond MD   5 mg at 08/06/20 0954    atorvastatin (LIPITOR) tablet 40 mg  40 mg Oral Nightly Elena Pond MD   40 mg at 08/06/20 2028    metoprolol tartrate (LOPRESSOR) tablet 25 mg  25 mg Oral BID Elena Pond MD   25 mg at 08/06/20 2027    sodium chloride flush 0.9 % injection 10 mL  10 mL Intravenous 2 times per day Elena Delatorre MD   10 mL at 08/06/20 2028    sodium chloride flush 0.9 % injection 10 mL  10 mL Intravenous PRN Elena Pond MD        acetaminophen (TYLENOL) tablet 650 mg  650 mg Oral Q6H PRN Elena Pond MD   650 mg at 08/06/20 2027    Or    acetaminophen (TYLENOL) suppository 650 mg  650 mg Rectal Q6H PRN Elena Pond MD        promethazine (PHENERGAN) tablet 12.5 mg  12.5 mg Oral Q6H PRN Elena Pond MD        Or    ondansetron (ZOFRAN) injection 4 mg  4 mg Intravenous Q6H PRN Elena Pond MD        0.9 % sodium chloride infusion   Intravenous Continuous Elena Carrillo  mL/hr at 08/06/20 1723      glucose (GLUTOSE) 40 % oral gel 15 g  15 g Oral PRN Elena Pond MD        dextrose 50 % IV solution  12.5 g Intravenous PRN Elena Pond MD        glucagon (rDNA) injection 1 mg  1 mg Intramuscular PRN Elena Pond MD        dextrose 5 % solution  100 mL/hr Intravenous PRN Elena Delatorre MD              Review of Systems:    · History obtained from patient, otherwise, further 10 point ROS is negative        Physical Exam:    /75   Pulse 78   Temp 98.5 °F (36.9 °C) (Oral)   Resp 16   Ht 5' 5\" (1.651 m)   Wt 110 lb 3.7 oz (50 kg)   SpO2 100%   BMI 18.34 kg/m²     General appearance: alert and cooperative; no acute distress  Head: NCAT, PERRLA, EOMI; oral and nasopharynx without lesions, dentition adequate repair  Neck: no JVD or thyromegaly  Lungs: clear to auscultation bilaterally; no audible rhonchi, rales, wheezes or crackles  Heart: regular rate and rhythm, S1, S2 normal; no murmurs, rubs or gallops  Abdomen: soft, non-tender and non-distended; normoactive, tympanic bowel sounds; no hepatosplenomegaly  Extremities: no cyanosis, clubbing, edema or asymmetry  Skin: no jaundice, purpura or petechiae  Lymph Nodes:  no auricular, cervical, supraclavicular, axillary, inguinal or popliteal lymphadenopathy  Neurologic:  CN 2-12 intact; no focal motor, sensory or cerebellar deficits        Laboratory Evaluation:      CBC:   Lab Results   Component Value Date    WBC 4.2 08/07/2020    NEUTROABS 3.0 08/07/2020    HGB 9.6 08/07/2020    MCV 88.7 08/07/2020     08/07/2020       BMP:   Lab Results   Component Value Date     08/07/2020    K 3.8 08/07/2020    K 3.4 08/06/2020    CO2 18 08/07/2020    BUN 18 08/07/2020    CREATININE 2.3 08/07/2020    MG 2.60 08/07/2020    TSH 0.93 03/30/2020       HEPATIC:  Lab Results   Component Value Date    AST 14 04/01/2020    ALT 6 04/01/2020    ALKPHOS 64 04/01/2020    PROT 6.8 04/01/2020    PROT 7.5 04/09/2012    BILITOT <0.2 04/01/2020    BILIDIR <0.2 03/30/2020     03/31/2020           Radiology Evaluation:    CT A/P:    Development of hypodense lesion within liver likely relates to primary or metastatic neoplasm reduced since 2017 examination.   .         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 Clinical correlation suggested.         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 Vascular consult may be useful if indicated.         Marked colonic diverticulosis without diverticulitis.         No hydronephrosis         No change in left adrenal nodule         Marked lytic lesions noted consistent with patient's myeloma involving the bilateral ribs. Lower thoracic spine, lumbar spine, pelvis, sacrum and hips and proximal femurs with lytic lesion involving the left iliac bone with pathologic fracture with gap    involving the left iliac bone present previously.             Assessment / Plan (Addended):      Multiple Myeloma      - rise in lambda light chains, but BMBx does not identify the source  - lytic lesions on CT scan not unexpected, but they are inc'ed since March 2020   - these may be source of rising light chains    - porcelain gallbladder has been present chronically, not a new finding  - also, with vascular issues, pseudoaneurysm as example    - unexpected finding, however, is liver lesion  - reviewed scans with radiology, will plan for percutaneous biopsy    - agree with current care to recover kidney function    - given the new bone lesions, will need to change therapy  - gallbladder, aneurysm, etc will limit a BMT strategy    - plan for ai/estelle/dex as outpatient, after discharge and depending upon results of liver biopsy            As always, thank you for allowing me the opportunity to participate in the care of your patient. Please do not hesitate to contact me with questions or concerns regarding further management. Luca Huber DO, MS  Oncology/Hematology Care    Please contact via:  1. [employer]  2.   Cell Phone:  [phone]    8/7/2020   9:47 AM

## 2020-08-08 LAB
ALBUMIN SERPL-MCNC: 3 G/DL (ref 3.4–5)
ANION GAP SERPL CALCULATED.3IONS-SCNC: 14 MMOL/L (ref 3–16)
BASOPHILS ABSOLUTE: 0 K/UL (ref 0–0.2)
BASOPHILS RELATIVE PERCENT: 0.8 %
BUN BLDV-MCNC: 15 MG/DL (ref 7–20)
CALCIUM SERPL-MCNC: 10.5 MG/DL (ref 8.3–10.6)
CHLORIDE BLD-SCNC: 106 MMOL/L (ref 99–110)
CO2: 19 MMOL/L (ref 21–32)
CREAT SERPL-MCNC: 1.7 MG/DL (ref 0.6–1.2)
EOSINOPHILS ABSOLUTE: 0.1 K/UL (ref 0–0.6)
EOSINOPHILS RELATIVE PERCENT: 1.7 %
GFR AFRICAN AMERICAN: 36
GFR NON-AFRICAN AMERICAN: 30
GLUCOSE BLD-MCNC: 77 MG/DL (ref 70–99)
HCT VFR BLD CALC: 27.5 % (ref 36–48)
HEMOGLOBIN: 9 G/DL (ref 12–16)
KAPPA, FREE LIGHT CHAINS, SERUM: 9.47 MG/L (ref 3.3–19.4)
KAPPA/LAMBDA RATIO: 0.01 (ref 0.26–1.65)
KAPPA/LAMBDA TEST COMMENT: ABNORMAL
LAMBDA, FREE LIGHT CHAINS, SERUM: 878 MG/L (ref 5.71–26.3)
LYMPHOCYTES ABSOLUTE: 0.6 K/UL (ref 1–5.1)
LYMPHOCYTES RELATIVE PERCENT: 15.1 %
MAGNESIUM: 1.8 MG/DL (ref 1.8–2.4)
MAGNESIUM: 2 MG/DL (ref 1.8–2.4)
MCH RBC QN AUTO: 29.1 PG (ref 26–34)
MCHC RBC AUTO-ENTMCNC: 32.6 G/DL (ref 31–36)
MCV RBC AUTO: 89.5 FL (ref 80–100)
MONOCYTES ABSOLUTE: 0.8 K/UL (ref 0–1.3)
MONOCYTES RELATIVE PERCENT: 18.7 %
NEUTROPHILS ABSOLUTE: 2.7 K/UL (ref 1.7–7.7)
NEUTROPHILS RELATIVE PERCENT: 63.7 %
PDW BLD-RTO: 19.3 % (ref 12.4–15.4)
PHOSPHORUS: 3.8 MG/DL (ref 2.5–4.9)
PLATELET # BLD: 168 K/UL (ref 135–450)
PMV BLD AUTO: 7.3 FL (ref 5–10.5)
POTASSIUM REFLEX MAGNESIUM: 3.4 MMOL/L (ref 3.5–5.1)
POTASSIUM REFLEX MAGNESIUM: 3.9 MMOL/L (ref 3.5–5.1)
POTASSIUM SERPL-SCNC: 3.1 MMOL/L (ref 3.5–5.1)
RBC # BLD: 3.08 M/UL (ref 4–5.2)
SODIUM BLD-SCNC: 139 MMOL/L (ref 136–145)
WBC # BLD: 4.2 K/UL (ref 4–11)

## 2020-08-08 PROCEDURE — 36415 COLL VENOUS BLD VENIPUNCTURE: CPT

## 2020-08-08 PROCEDURE — 6370000000 HC RX 637 (ALT 250 FOR IP): Performed by: STUDENT IN AN ORGANIZED HEALTH CARE EDUCATION/TRAINING PROGRAM

## 2020-08-08 PROCEDURE — 80069 RENAL FUNCTION PANEL: CPT

## 2020-08-08 PROCEDURE — 2060000000 HC ICU INTERMEDIATE R&B

## 2020-08-08 PROCEDURE — 85025 COMPLETE CBC W/AUTO DIFF WBC: CPT

## 2020-08-08 PROCEDURE — 83735 ASSAY OF MAGNESIUM: CPT

## 2020-08-08 PROCEDURE — 84132 ASSAY OF SERUM POTASSIUM: CPT

## 2020-08-08 PROCEDURE — 84166 PROTEIN E-PHORESIS/URINE/CSF: CPT

## 2020-08-08 PROCEDURE — 2580000003 HC RX 258: Performed by: STUDENT IN AN ORGANIZED HEALTH CARE EDUCATION/TRAINING PROGRAM

## 2020-08-08 PROCEDURE — 6360000002 HC RX W HCPCS: Performed by: STUDENT IN AN ORGANIZED HEALTH CARE EDUCATION/TRAINING PROGRAM

## 2020-08-08 PROCEDURE — 99232 SBSQ HOSP IP/OBS MODERATE 35: CPT | Performed by: INTERNAL MEDICINE

## 2020-08-08 PROCEDURE — 84156 ASSAY OF PROTEIN URINE: CPT

## 2020-08-08 RX ORDER — POTASSIUM CHLORIDE 20 MEQ/1
40 TABLET, EXTENDED RELEASE ORAL ONCE
Status: COMPLETED | OUTPATIENT
Start: 2020-08-08 | End: 2020-08-08

## 2020-08-08 RX ADMIN — ATORVASTATIN CALCIUM 40 MG: 40 TABLET, FILM COATED ORAL at 20:24

## 2020-08-08 RX ADMIN — POTASSIUM CHLORIDE 40 MEQ: 20 TABLET, EXTENDED RELEASE ORAL at 08:21

## 2020-08-08 RX ADMIN — HEPARIN SODIUM 5000 UNITS: 5000 INJECTION INTRAVENOUS; SUBCUTANEOUS at 13:15

## 2020-08-08 RX ADMIN — HEPARIN SODIUM 5000 UNITS: 5000 INJECTION INTRAVENOUS; SUBCUTANEOUS at 05:02

## 2020-08-08 RX ADMIN — HEPARIN SODIUM 5000 UNITS: 5000 INJECTION INTRAVENOUS; SUBCUTANEOUS at 20:24

## 2020-08-08 RX ADMIN — METOPROLOL TARTRATE 25 MG: 25 TABLET, FILM COATED ORAL at 20:24

## 2020-08-08 RX ADMIN — POTASSIUM CHLORIDE 40 MEQ: 20 TABLET, EXTENDED RELEASE ORAL at 15:18

## 2020-08-08 RX ADMIN — ACETAMINOPHEN 650 MG: 325 TABLET ORAL at 18:41

## 2020-08-08 RX ADMIN — METOPROLOL TARTRATE 25 MG: 25 TABLET, FILM COATED ORAL at 08:21

## 2020-08-08 RX ADMIN — AMLODIPINE BESYLATE 5 MG: 5 TABLET ORAL at 08:21

## 2020-08-08 RX ADMIN — ACETAMINOPHEN 650 MG: 325 TABLET ORAL at 11:53

## 2020-08-08 RX ADMIN — Medication 10 ML: at 20:25

## 2020-08-08 ASSESSMENT — PAIN SCALES - GENERAL
PAINLEVEL_OUTOF10: 0
PAINLEVEL_OUTOF10: 10
PAINLEVEL_OUTOF10: 10

## 2020-08-08 ASSESSMENT — PAIN DESCRIPTION - LOCATION: LOCATION: RIB CAGE

## 2020-08-08 ASSESSMENT — PAIN DESCRIPTION - PAIN TYPE: TYPE: ACUTE PAIN

## 2020-08-08 ASSESSMENT — PAIN DESCRIPTION - PROGRESSION: CLINICAL_PROGRESSION: NOT CHANGED

## 2020-08-08 ASSESSMENT — PAIN - FUNCTIONAL ASSESSMENT: PAIN_FUNCTIONAL_ASSESSMENT: ACTIVITIES ARE NOT PREVENTED

## 2020-08-08 ASSESSMENT — PAIN DESCRIPTION - ORIENTATION: ORIENTATION: LEFT

## 2020-08-08 ASSESSMENT — PAIN DESCRIPTION - DESCRIPTORS: DESCRIPTORS: ACHING

## 2020-08-08 ASSESSMENT — PAIN DESCRIPTION - FREQUENCY: FREQUENCY: INTERMITTENT

## 2020-08-08 ASSESSMENT — PAIN DESCRIPTION - ONSET: ONSET: SUDDEN

## 2020-08-08 NOTE — PROGRESS NOTES
Nephrology  Note                                                                                                                                                                                                                                                                                                                                                               Office : [phone]     Fax :[phone]              Patient's Name: Michael   8/8/2020    Reason for Consult:  WANDER on CKD      History of Present Ilness:    Michael  is a 65 y.o. female with Multiple myeloma, CKD 3 not on dialysis, HTN, CVA, Porcelain gall bladder who presents with chief complaint of urinary retention. Patient reports that in the past 24 hours she has not urinated. Pt has multiple Myeloma on A acyclovir, PPI. Yesterday she noticed that her diaper is dry and she did not void all day. This is abnormal for her and has never happened in the past.  She denies starting any new medications. She does have urinary urgency. There is no associated abdominal pain. She denies any fever or chills. She does have good p.o. intake today as she has drinking approximately 36 ounces of fluids. Yesterday she voided approximately 4-5 times. She recently had a tunneled dialysis catheter placed which is now removed. During that time she was still voiding well. She denies any lower extremity swelling. De Leon is placed in the ER and she has only made 100 cc of urine. Patient has history of myeloma kidney and was on dialysis. Her kidney function did get better with the chemo. She was off dialysis.     Patient has new onset WANDER.     INTERVAL HISTORY    Feels better  Shortness of breath: No   UOP: Good   Creat: trending down  Daughter in room         Past Medical History:   Diagnosis Date    Allergic rhinitis     Anemia     CAD (coronary artery disease)     CAD (coronary artery disease)     Diverticulosis     ESRD (end stage renal input(s): LABURIN in the last 72 hours. Urine Chemistry:   Recent Labs     20  0420 20  1125   CLUR 118  --    LABCREA  --  20.3*   PROTEINUR  --  30.10*   NAUR  --  122             IMAGING:      Assessment/Plan   Jayla  is a 65 y.o. female with Multiple myeloma, CKD 3 not on dialysis, HTN, CVA, Porcelain gall bladder who presented with chief complaint of urinary retention.          WANDER on CKD 3  - suspect due to possible worsening of multiple myeloma - hx of myeloma kidney  - Cr increased to 3.0 from 1.1 in 2020 (range is 1.1 - 12.0)  - no anion gap  - UA positive for: leukocyte esterase (small), protein (trace), epithelial cells, bact 1+  - good urinary output overnight  - monitor I/O  - prot/Cr ratio 1.48  - Cr better with IVF   - Very High risk for WANDER as her light chains are tubulotoxic   - She needed RRT sec to Myeloma kidney which recovered   - dc IVF        Hyponatremia - now resolved  - monitor Na, f/u RFP  - supplement as needed      Hypokalemia   - K 5.7 on admission  - supplement as needed      HTN  - BP high normal  - on amlodipine, lopressor       Anemia  - Hgb 9.8 on admission --> 9.6 - at pt's baseline  - hx multiple myeloma  - monitor H/H  - hematology/oncology team following, appreciate involvement          Thank you for allowing us to participate in care of Amor Rae free to contact    Leatha Sosa  2020    [facility] [address]  Office : [phone]  Fax :[phone]

## 2020-08-08 NOTE — PLAN OF CARE
Problem: Falls - Risk of:  Goal: Will remain free from falls  Description: Will remain free from falls  Outcome: Ongoing  Note: Pt is a High fall risk. See Kassie Satchel Fall Score and ABCDS Injury Risk assessments. Explained fall risk precautions to pt and family and rationale behind their use to keep the patient safe. Pt bed is in low position, side rails up, call light and belongings are in reach. Fall wristband applied and present on pts wrist.  Bed alarm on. Pt encouraged to call for assistance. Will continue with hourly rounds for PO intake, pain needs, toileting and repositioning as needed. Problem: Nutrition  Goal: Optimal nutrition therapy  Outcome: Ongoing  Note: Patient only eating about 25% of meals, she states it is because she doesn't like the food- family bringing in snacks from home and encouraging better PO intake. Will continue to monitor. Problem: Fluid Volume:  Goal: Ability to achieve a balanced intake and output will improve  Description: Ability to achieve a balanced intake and output will improve  Outcome: Ongoing  Note: Hammad App with good UOP- 24 hour urine sent to lab this morning. Will continue to monitor. Problem: Physical Regulation:  Goal: Ability to maintain clinical measurements within normal limits will improve  Description: Ability to maintain clinical measurements within normal limits will improve  Outcome: Ongoing  Note: Patient up with standby assist- up to chair for 25% of this shift. Using IS while in bed. Will continue to monitor.

## 2020-08-08 NOTE — CONSULTS
Nephrology Consult Note                                                                                                                                                                                                                                                                                                                                                               Office : [phone]     Fax :[phone]              Patient's Name: Herminia García  8:42 PM  8/7/2020    Reason for Consult:  WANDER on CKD  Requesting Physician:  Maria Luisa Pond      Chief Complaint:  Urinary retention    History of Present Ilness:    Herminia García is a 65 y.o. female with Multiple myeloma, ESRD not on dialysis, HTN, CVA, Porcelain gall bladder who presents with chief complaint of urinary retention. Patient reports that in the past 24 hours she has not urinated. Pt has multiple Myeloma on A acyclovir, PPI. Yesterday she noticed that her diaper is dry and she did not void all day. This is abnormal for her and has never happened in the past.  She denies starting any new medications. She does have urinary urgency. There is no associated abdominal pain. She denies any fever or chills. She does have good p.o. intake today as she has drinking approximately 36 ounces of fluids. Yesterday she voided approximately 4-5 times. She recently had a tunneled dialysis catheter placed which is now removed. During that time she was still voiding well. She denies any lower extremity swelling. De Leon is placed in the ER and she has only made 100 cc of urine. Patient has history of myeloma kidney and was on dialysis. Her kidney function did get better with the chemo. She was off dialysis.     Patient has new onset WANDER. 8/06:   No acute events overnight. Making good urine. Pt denies shortness of breath, chest pain, urinary sxs including urgency or feelings of retention of urine, or abdominal pain.       Review of Systems: Constitutional: Negative for chills or fevers. Respiratory: Negative for cough and shortness of breath. Cardiovascular: Negative for chest pain. Gastrointestinal: Negative for abdominal pain, diarrhea, nausea and vomiting. Genitourinary: Positive for decreased urine volume. 14 point ROS obtained but were negative except mentioned in HPI. Past Medical History:   Diagnosis Date    Allergic rhinitis     Anemia     CAD (coronary artery disease)     CAD (coronary artery disease)     Diverticulosis     ESRD (end stage renal disease) on dialysis ([facility] [address] 2020    GERD (gastroesophageal reflux disease)     History of colonoscopy     Hyperlipidemia     Hypertension     Myocardial infarction, inferior wall [facility]) 2006       Past Surgical History:   Procedure Laterality Date    BREAST REDUCTION SURGERY  Bilat     SECTION  x3    CT BONE MARROW BIOPSY  2020    CT BONE MARROW BIOPSY 2020 [facility] CT SCAN    HYSTERECTOMY      TYMPANOPLASTY         Family History   Problem Relation Age of Onset    Heart Disease Mother     High Cholesterol Mother     Heart Disease Sister         hypertension    Diabetes Sister         hypertension    High Cholesterol Sister         hypertension    Heart Disease Brother     High Cholesterol Brother     Stroke Brother         hypertension        reports that she has been smoking. She has a 22.50 pack-year smoking history. She has never used smokeless tobacco. She reports that she does not drink alcohol or use drugs. Allergies:  Patient has no known allergies.     Current Medications:    heparin (porcine) injection 5,000 Units, Q8H  amLODIPine (NORVASC) tablet 5 mg, Daily  atorvastatin (LIPITOR) tablet 40 mg, Nightly  metoprolol tartrate (LOPRESSOR) tablet 25 mg, BID  sodium chloride flush 0.9 % injection 10 mL, 2 times per day  sodium chloride flush 0.9 % injection 10 mL, PRN  acetaminophen (TYLENOL) tablet 650 mg, Q6H PRN Or  acetaminophen (TYLENOL) suppository 650 mg, Q6H PRN  promethazine (PHENERGAN) tablet 12.5 mg, Q6H PRN    Or  ondansetron (ZOFRAN) injection 4 mg, Q6H PRN  0.9 % sodium chloride infusion, Continuous  glucose (GLUTOSE) 40 % oral gel 15 g, PRN  dextrose 50 % IV solution, PRN  glucagon (rDNA) injection 1 mg, PRN  dextrose 5 % solution, PRN            Physical exam:     Vitals:  BP (!) 157/70   Pulse 72   Temp 98.5 °F (36.9 °C) (Oral)   Resp 16   Ht 5' 5\" (1.651 m)   Wt 110 lb 3.7 oz (50 kg)   SpO2 97%   BMI 18.34 kg/m²     Constitutional:  OAA X3 NAD  Skin: no rash, turgor wnl  Heent:  eomi, mmm  Neck: no bruits or jvd noted  Cardiovascular:  S1, S2 without m/r/g  Respiratory: CTA B without w/r/r  Abdomen:  +bs, soft, nt, nd  Ext: No lower extremity edema  Psychiatric: mood and affect appropriate      Data:   Labs:  CBC:   Recent Labs     08/05/20 1938 08/07/20  0556   WBC 6.0 4.2   HGB 9.8* 9.6*    181     BMP:    Recent Labs     08/05/20 1938 08/06/20  0301 08/07/20  0556 08/07/20  1147 08/07/20  1802   * 130*  --  136  --   --    K 5.7* 3.8   < > 3.8 3.7 3.4*   CL 98* 100  --  104  --   --    CO2 17* 18*  --  18*  --   --    BUN 26* 26*  --  18  --   --    CREATININE 2.9* 3.0*  --  2.3*  --   --    GLUCOSE 118* 214*  --  90  --   --     < > = values in this interval not displayed. Ca/Mg/Phos:   Recent Labs     08/05/20 1938 08/06/20 0301 08/06/20  1804 08/07/20  0156 08/07/20  0556 08/07/20  1802   CALCIUM 9.6 9.6  --   --  9.8  --    MG  --   --  0.70* 2.60*  --  2.30   PHOS  --   --   --   --  3.3  --      Hepatic: No results for input(s): AST, ALT, ALB, BILITOT, ALKPHOS in the last 72 hours. Troponin: No results for input(s): TROPONINI in the last 72 hours. BNP: No results for input(s): BNP in the last 72 hours. Lipids: No results for input(s): CHOL, TRIG, HDL, LDLCALC, LABVLDL in the last 72 hours.   ABGs: No results for input(s): PHART, PO2ART, [id number] in the last 72 hours.  INR: No results for input(s): INR in the last 72 hours. UA:  Recent Labs     08/05/20 1938   COLORU Yellow   CLARITYU Clear   GLUCOSEU Negative   BILIRUBINUR Negative   KETUA Negative   SPECGRAV 1.010   BLOODU Negative   PHUR 7.0   PROTEINU TRACE*   UROBILINOGEN 0.2   NITRU Negative   LEUKOCYTESUR SMALL*   LABMICR YES   URINETYPE Voided      Urine Microscopic:   Recent Labs     08/05/20 1938   BACTERIA 1+*   WBCUA 3-5   RBCUA None seen   EPIU 6-10*     Urine Culture: No results for input(s): LABURIN in the last 72 hours.   Urine Chemistry:   Recent Labs     08/06/20  0420 08/06/20  1125   CLUR 118  --    LABCREA  --  20.3*   PROTEINUR  --  30.10*   NAUR  --  80             IMAGING:      Assessment/Plan   Maria Fernanda Goodson is a 65 y.o. female with        WANDER on ESRD  - suspect due to possible worsening of multiple myeloma - hx of myeloma kidney  - Cr increased to 3.0 from 1.1 in 7/2020 (range is 1.1 - 12.0)  - no anion gap  - UA positive for: leukocyte esterase (small), protein (trace), epithelial cells, bact 1+  - good urinary output overnight: 1650cc  - monitor I/O  - f/u prot/Cr ratio      Hyponatremia  - Na 126 on admission -->130  - monitor Na, f/u RFP      Hyperkalemia - now resolving  - K 5.7 on admission -->3.9  - kayexalate 15g PO x1      HTN  - /67 on admission  - manage as per primary care team  - on amlodipine, lopressor       Anemia  - Hgb 9.8 on admission - at pt's baseline  - hx multiple myeloma  - monitor H/H            I will discuss patient and findings with my attending Dr. Naeem Candelaria MD.     Napoleon Serna MD  PGY-1        Thank you for allowing us to participate in care of Amor Rae free to contact  [facility] [address]  Office : [phone]  Fax :[phone]    Pt has WANDER on CKD 3   Has hx of MM   BM bx   IVF   Replace lytes     Aneta Thomas MD

## 2020-08-08 NOTE — PROGRESS NOTES
Medical Student Progress Note     Admit Date: 8/5/2020    CC: unable to make urine   Interval history: No acute events overnight. Ms. Tineo Shoulder has no complaints this morning. Her chadwick catheter was removed yesterday, she is have been urinating well on her own, currently collecting 24 hour urine output for testing. Denies SOB, CP and abdominal pain. Medications:     Scheduled Meds:   heparin (porcine)  5,000 Units Subcutaneous Q8H    amLODIPine  5 mg Oral Daily    atorvastatin  40 mg Oral Nightly    metoprolol tartrate  25 mg Oral BID    sodium chloride flush  10 mL Intravenous 2 times per day     Continuous Infusions:   sodium chloride 50 mL/hr at 08/07/20 2043    dextrose       PRN Meds:sodium chloride flush, acetaminophen **OR** acetaminophen, promethazine **OR** ondansetron, glucose, dextrose, glucagon (rDNA), dextrose    Objective:     Vitals:   T-max:  Patient Vitals for the past 8 hrs:   BP Temp Temp src Pulse Resp SpO2   08/08/20 0819 (!) 156/70 98.4 °F (36.9 °C) Oral 81 17 100 %   08/08/20 0459 (!) 165/82 98 °F (36.7 °C) Oral 73 16 97 %       Intake/Output Summary (Last 24 hours) at 8/8/2020 0849  Last data filed at 8/8/2020 2054  Gross per 24 hour   Intake 4927.69 ml   Output 2425 ml   Net 2502.69 ml          Physical Exam   Constitutional: Patient is oriented to person, place, and time. Appears well-developed and well-nourished. No distress. Cardiovascular: Regular rate and rythem with normal heart sounds and intact distal pulses. No murmurs, rubs, or gallops  Pulmonary/Chest: Effort normal and breath sounds normal. No respiratory distress. No wheezes, rhonchi, or rales   Abdominal: Soft, non tender, non distended with normal bowel sounds. No rebound or guarding. Musculoskeletal: Normal range of motion with no extremity edema  Neurological: Alert and oriented to person, place, and time. No gross cranial nerve deficit. Skin: Skin is warm and dry. No rash noted.    Psychiatric: Normal mood and affect. Normal behavior       LABS:    CBC:   Recent Labs     08/05/20  1938 08/07/20  0556 08/08/20 0628   WBC 6.0 4.2 4.2   HGB 9.8* 9.6* 9.0*   HCT 30.9* 29.1* 27.5*    181 168   MCV 93.2 88.7 89.5     Renal:    Recent Labs     08/06/20  0301  08/07/20  0156 08/07/20  0556  08/07/20  1802 08/07/20  2352 08/08/20  0628   *  --   --  136  --   --   --  139   K 3.8   < > 3.9 3.8   < > 3.4* 3.4* 3.1*     --   --  104  --   --   --  106   CO2 18*  --   --  18*  --   --   --  19*   BUN 26*  --   --  18  --   --   --  15   CREATININE 3.0*  --   --  2.3*  --   --   --  1.7*   GLUCOSE 214*  --   --  90  --   --   --  77   CALCIUM 9.6  --   --  9.8  --   --   --  10.5   MG  --    < > 2.60*  --   --  2.30 2.00  --    PHOS  --   --   --  3.3  --   --   --  3.8   ANIONGAP 12  --   --  14  --   --   --  14    < > = values in this interval not displayed. Hepatic:   Recent Labs     08/06/20  0635 08/07/20  0556 08/08/20 0628   PROT 6.0*  --   --    LABALBU  --  3.0* 3.0*     Troponin: No results for input(s): TROPONINI in the last 72 hours. BNP: No results for input(s): BNP in the last 72 hours. Lipids: No results for input(s): CHOL, HDL in the last 72 hours. Invalid input(s): LDLCALCU, TRIGLYCERIDE  ABGs:  No results for input(s): PHART, ECT5WPB, PO2ART, WRO1USN, BEART, THGBART, N4JUPEBS, SHE7XNF in the last 72 hours. INR: No results for input(s): INR in the last 72 hours. Lactate: No results for input(s): LACTATE in the last 72 hours. Cultures:  -----------------------------------------------------------------  RAD:   CT ABDOMEN PELVIS WO CONTRAST Additional Contrast? None   Final Result   Addendum 1 of 1   [******** ADDENDUM #1 ********   ADDENDUM: By Dr. Cara Astorga dictation by Dr. Humble Barber      Surgery consultation and requested measurements of the pseudoaneurysm and    bifurcation.    Measurements of the study of 8/6/2020 are compared with 3/30/2020 and    7/22/2017 The right common iliac artery distal to the aneurysm series 3 image 111    measures 11 x 13 mm, 11 mm diameter 3/30/2020 of radiology, and in /2017    measuring 10 mm in diameter. The right common iliac artery series 3 image 102 measures 25 x 24 mm    previously measuring 23 x 22 mm 32,020 and 17 mm in diameter 7/22/2017. The inflammatory reaction surrounding the pseudoaneurysm is resolved as    noted March 30, 2020. The distal aorta just at the bifurcation measures 30 x 28 mm previously    measuring 30 x 27 mm 3/30/2020 and 20 x 22 mm, 2017      Incidentally, porcelain gallbladder is noted with an impacted stone in the    gallbladder neck unchanged from 2017 and an additional stone in the    distended gallbladder fundus. Final      US ABDOMINAL AORTA LIMITED    (Results Pending)         Assessment/Plan:     65 y. o. female with PMHx of Multiple myeloma, ESRD not on dialysis, HTN, CVA, Porcelain gall bladder who presented to [facility]. X1 day hx of inability to make urine.      WANDER on CKD  Pt has hx of ESRD, s/p dialysis, p/w un uria,  Cr 2.9, K 5.7. She has produced ample urine on her own for the past 24 hours. Patient appears to have been fluid down causing lack of urination and increased Cr. CT without contrast showed no hydronephrosis. Marked lytic lesions consistent with MM involving the bilateral ribs, lower thoracic spine, lumbar spine, pelvis, sacrum and hips and proximal femur and left iliac bone with pathological  Fracture. Results from July 25, 2020 showed elevated urine kappa and lambda light chains. Concern for recurrence of myeloma kidney. -current labs showed elevated serum lambda light chains at 878.00.  K/L  Serum ratio of light chains is 0.01.   -Pending urine electrophoresis results.    -IVF  -Daily BMP  -Nephrology consult    Hyperkalemia 2/2 to WANDER  Currently hypokalemia at 3.1 from 3.4 ( 8/7/2020)  -Currently being replenished    -IVF  -Daily BMP     Hyponatremia:  Na 126 , from 139  Improved to 130 this morning. Urine osmo low at 268, urine K 7.7, and urine chloride 118. Urine Cr 20.3, urine Na 122. -IVF   -Daily BMP     Multiple myloma  Hx of multiple myloma, no sign of infection, WANDER on ckd 3. CT without contrast showed marked lytic lesions, increased since last imaging in March  and development of hypodense lesions within the liver, relates to primary or metastatic neoplasm, reduced since 2017. Results from July 25, 2020 showed elevated urine kappa and lambda light chains. -holding acyclovir   -Consult oncology. As per note, plan for percutaneous liver biopsy on Monday and will need to change MM therapy. Plan for ai/estelle/dex as outpatient, after discharge and depending upon results of liver biopsy  -stable  -CBC     Abdominal Aneurysm   -incidental finding on Ct w/o contrast. Imaging showed:     The right common iliac artery distal to the aneurysm series 3 image 111 measures 11 x 13 mm, 11 mm diameter 3/30/2020 of radiology, and in /2017 measuring 10 mm in diameter. The right common iliac artery series 3 image 102 measures 25 x 24 mm previously measuring 23 x 22 mm 32,020 and 17 mm in diameter 7/22/2017. The inflammatory reaction surrounding the pseudoaneurysm is resolved as noted March 30, 2020. The distal aorta just at the bifurcation measures 30 x 28 mm previously measuring 30 x 27 mm 3/30/2020 and 20 x 22 mm, 2017   -Vascular consulted. As per note, no surgical intervention is indicated.  Recommend duplex ultrasound in 6 months and follow up with Dr. Nikko Carnes after duplex ultrasound obtained.       Code Status:Full Code  FEN: DIET RENAL;  PPX:  Lovenox  DISPO: ANA Mathew   08/08/20  8:49 AM    This patient will be staffed and discussed with Markos Nieto MD.

## 2020-08-08 NOTE — PROGRESS NOTES
Internal Medicine Progress Note PGY-1    Admit Date: 8/5/2020    CC: Unable to make urine    Interval history: No acute events overnight. Ms. Leija has no complaints this morning. Her chadwick catheter was removed yesterday, she is have been urinating well on her own, currently collecting 24 hour urine output for testing.      Denies SOB, CP and abdominal pain. Medications:     Scheduled Meds:   heparin (porcine)  5,000 Units Subcutaneous Q8H    amLODIPine  5 mg Oral Daily    atorvastatin  40 mg Oral Nightly    metoprolol tartrate  25 mg Oral BID    sodium chloride flush  10 mL Intravenous 2 times per day     Continuous Infusions:   sodium chloride 50 mL/hr at 08/07/20 2043    dextrose       PRN Meds:sodium chloride flush, acetaminophen **OR** acetaminophen, promethazine **OR** ondansetron, glucose, dextrose, glucagon (rDNA), dextrose    Objective:     Vitals:   T-max:  Patient Vitals for the past 8 hrs:   BP Temp Temp src Pulse Resp SpO2 Weight   08/08/20 0853 -- -- -- -- -- -- 107 lb 3.2 oz (48.6 kg)   08/08/20 0819 (!) 156/70 98.4 °F (36.9 °C) Oral 81 17 100 % --   08/08/20 0459 (!) 165/82 98 °F (36.7 °C) Oral 73 16 97 % --       Intake/Output Summary (Last 24 hours) at 8/8/2020 1015  Last data filed at 8/8/2020 7993  Gross per 24 hour   Intake 4687.69 ml   Output 2425 ml   Net 2262.69 ml     Physical Exam   Constitutional: Patient is oriented to person, place, and time. Appears well-developed and well-nourished. No distress. HENT: Head, normocephalic and atraumatic. External ears normal.   Mouth/Throat: Oropharynx is clear and moist. No exudate or erythema  Eyes: Pupils are equal, round, and reactive to light. Conjunctivae and EOM are normal.   Cardiovascular: Regular rate and rythem with normal heart sounds and intact distal pulses. No murmurs, rubs, or gallops  Pulmonary/Chest: Effort normal and breath sounds normal. No respiratory distress.  No wheezes, rhonchi, or rales   Abdominal: Soft, non tender, non distended with normal bowel sounds. No rebound or guarding. Musculoskeletal: Normal range of motion with no extremity edema      LABS:    CBC:   Recent Labs     08/05/20  1938 08/07/20  0556 08/08/20  0628   WBC 6.0 4.2 4.2   HGB 9.8* 9.6* 9.0*   HCT 30.9* 29.1* 27.5*    181 168   MCV 93.2 88.7 89.5     Renal:    Recent Labs     08/06/20  0301  08/07/20  0156 08/07/20  0556  08/07/20  1802 08/07/20  2352 08/08/20  0628   *  --   --  136  --   --   --  139   K 3.8   < > 3.9 3.8   < > 3.4* 3.4* 3.1*     --   --  104  --   --   --  106   CO2 18*  --   --  18*  --   --   --  19*   BUN 26*  --   --  18  --   --   --  15   CREATININE 3.0*  --   --  2.3*  --   --   --  1.7*   GLUCOSE 214*  --   --  90  --   --   --  77   CALCIUM 9.6  --   --  9.8  --   --   --  10.5   MG  --    < > 2.60*  --   --  2.30 2.00  --    PHOS  --   --   --  3.3  --   --   --  3.8   ANIONGAP 12  --   --  14  --   --   --  14    < > = values in this interval not displayed. Hepatic:   Recent Labs     08/06/20  0635 08/07/20  0556 08/08/20  0628   PROT 6.0*  --   --    LABALBU  --  3.0* 3.0*     Troponin: No results for input(s): TROPONINI in the last 72 hours. BNP: No results for input(s): BNP in the last 72 hours. Lipids: No results for input(s): CHOL, HDL in the last 72 hours. Invalid input(s): LDLCALCU, TRIGLYCERIDE  ABGs:  No results for input(s): PHART, XKI5QAN, PO2ART, IIR0KPI, BEART, THGBART, D3KWXCBR, JHK5XVL in the last 72 hours. INR: No results for input(s): INR in the last 72 hours. Lactate: No results for input(s): LACTATE in the last 72 hours. Cultures:  -----------------------------------------------------------------  RAD:   CT AP 8/6/2020  1. Development of hypodense lesion within liver likely relates to primary or metastatic neoplasm reduced since 2017 examination.   .         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  Clinical correlation suggested.         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 Vascular consult may be useful if indicated.         Marked colonic diverticulosis without diverticulitis.         No hydronephrosis         No change in left adrenal nodule         Marked lytic lesions noted consistent with patient's myeloma involving the bilateral ribs. Lower thoracic spine, lumbar spine, pelvis, sacrum and hips and proximal femurs with lytic lesion involving the left iliac bone with pathologic fracture with gap    involving the left iliac bone present previously. Assessment/Plan:   65 y.o. female with PMHx of Multiple myeloma, ESRD not on dialysis, HTN, CVA, Porcelain gall bladder who presented to [facility]. for un able to make urine of 1 day duration. WANDER on CKD  Pt has hx of ESRD, s/p dialysis, p/w un uria,  Cr 2.9, K 5.7. K has improved after lasix and fluids. . She has produced ample urine since admission. Patient appears to have been fluid down causing lack of urination and increased Cr. CT AP showed no evidence of obstruction. Concern for myeloma nephropathy. Urea excretion 61%. Lambda FLC to 878 with K FLC to 9.47. 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 Patient K improved after lasix and fluids. -IVF  -Potassium low today, will replace     Hyponatremia:  Na 126 , from 139  Improved to 136 this morning. Urine osmo low at 268, urine K 7.7, and urine chloride 118. Urine studies show evidence of intrnisc renal disease with fractional excretion of urea of 61%. -IVF   -Daily BMP     Multiple myloma  Hx of multiple myloma, no sign of infection, WANDER on ckd.  WANDER likely due to myeloma and has increased lambda FLC to 827 with kappa to 9.47.  New liver lesion on CT AP concerning for myeloma vs cholangiocarcinoma with hx of porcelain gallbladder.   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

## 2020-08-08 NOTE — PROGRESS NOTES
Report called to RN on [facility]. All questions answered.

## 2020-08-09 LAB
ALBUMIN SERPL-MCNC: 3.2 G/DL (ref 3.4–5)
ANION GAP SERPL CALCULATED.3IONS-SCNC: 11 MMOL/L (ref 3–16)
ANISOCYTOSIS: ABNORMAL
BASOPHILS ABSOLUTE: 0 K/UL (ref 0–0.2)
BASOPHILS RELATIVE PERCENT: 0.6 %
BUN BLDV-MCNC: 12 MG/DL (ref 7–20)
CALCIUM SERPL-MCNC: 11 MG/DL (ref 8.3–10.6)
CHLORIDE BLD-SCNC: 106 MMOL/L (ref 99–110)
CO2: 19 MMOL/L (ref 21–32)
CREAT SERPL-MCNC: 1.4 MG/DL (ref 0.6–1.2)
EKG ATRIAL RATE: 87 BPM
EKG DIAGNOSIS: NORMAL
EKG P AXIS: 72 DEGREES
EKG P-R INTERVAL: 168 MS
EKG Q-T INTERVAL: 342 MS
EKG QRS DURATION: 82 MS
EKG QTC CALCULATION (BAZETT): 411 MS
EKG R AXIS: -7 DEGREES
EKG T AXIS: 47 DEGREES
EKG VENTRICULAR RATE: 87 BPM
EOSINOPHILS ABSOLUTE: 0.1 K/UL (ref 0–0.6)
EOSINOPHILS RELATIVE PERCENT: 1.6 %
GFR AFRICAN AMERICAN: 45
GFR NON-AFRICAN AMERICAN: 37
GLUCOSE BLD-MCNC: 91 MG/DL (ref 70–99)
HCT VFR BLD CALC: 28 % (ref 36–48)
HEMOGLOBIN: 9.2 G/DL (ref 12–16)
HYPOCHROMIA: ABNORMAL
LYMPHOCYTES ABSOLUTE: 0.8 K/UL (ref 1–5.1)
LYMPHOCYTES RELATIVE PERCENT: 19.4 %
MCH RBC QN AUTO: 29.1 PG (ref 26–34)
MCHC RBC AUTO-ENTMCNC: 32.9 G/DL (ref 31–36)
MCV RBC AUTO: 88.2 FL (ref 80–100)
MONOCYTES ABSOLUTE: 0.7 K/UL (ref 0–1.3)
MONOCYTES RELATIVE PERCENT: 16.6 %
NEUTROPHILS ABSOLUTE: 2.7 K/UL (ref 1.7–7.7)
NEUTROPHILS RELATIVE PERCENT: 61.8 %
PDW BLD-RTO: 19.1 % (ref 12.4–15.4)
PHOSPHORUS: 3.2 MG/DL (ref 2.5–4.9)
PLATELET # BLD: 174 K/UL (ref 135–450)
PMV BLD AUTO: 7.2 FL (ref 5–10.5)
POTASSIUM REFLEX MAGNESIUM: 3.8 MMOL/L (ref 3.5–5.1)
POTASSIUM SERPL-SCNC: 3.8 MMOL/L (ref 3.5–5.1)
RBC # BLD: 3.17 M/UL (ref 4–5.2)
SLIDE REVIEW: ABNORMAL
SODIUM BLD-SCNC: 136 MMOL/L (ref 136–145)
WBC # BLD: 4.3 K/UL (ref 4–11)

## 2020-08-09 PROCEDURE — 6370000000 HC RX 637 (ALT 250 FOR IP): Performed by: STUDENT IN AN ORGANIZED HEALTH CARE EDUCATION/TRAINING PROGRAM

## 2020-08-09 PROCEDURE — 2580000003 HC RX 258: Performed by: STUDENT IN AN ORGANIZED HEALTH CARE EDUCATION/TRAINING PROGRAM

## 2020-08-09 PROCEDURE — 6360000002 HC RX W HCPCS: Performed by: STUDENT IN AN ORGANIZED HEALTH CARE EDUCATION/TRAINING PROGRAM

## 2020-08-09 PROCEDURE — 80069 RENAL FUNCTION PANEL: CPT

## 2020-08-09 PROCEDURE — 2060000000 HC ICU INTERMEDIATE R&B

## 2020-08-09 PROCEDURE — 84132 ASSAY OF SERUM POTASSIUM: CPT

## 2020-08-09 PROCEDURE — 85025 COMPLETE CBC W/AUTO DIFF WBC: CPT

## 2020-08-09 PROCEDURE — 99232 SBSQ HOSP IP/OBS MODERATE 35: CPT | Performed by: INTERNAL MEDICINE

## 2020-08-09 RX ORDER — AMLODIPINE BESYLATE 10 MG/1
10 TABLET ORAL DAILY
Status: DISCONTINUED | OUTPATIENT
Start: 2020-08-09 | End: 2020-08-14 | Stop reason: HOSPADM

## 2020-08-09 RX ADMIN — ATORVASTATIN CALCIUM 40 MG: 40 TABLET, FILM COATED ORAL at 21:43

## 2020-08-09 RX ADMIN — Medication 10 ML: at 21:43

## 2020-08-09 RX ADMIN — ACETAMINOPHEN 650 MG: 325 TABLET ORAL at 03:48

## 2020-08-09 RX ADMIN — METOPROLOL TARTRATE 25 MG: 25 TABLET, FILM COATED ORAL at 21:43

## 2020-08-09 RX ADMIN — HEPARIN SODIUM 5000 UNITS: 5000 INJECTION INTRAVENOUS; SUBCUTANEOUS at 12:23

## 2020-08-09 RX ADMIN — Medication 10 ML: at 07:59

## 2020-08-09 RX ADMIN — HEPARIN SODIUM 5000 UNITS: 5000 INJECTION INTRAVENOUS; SUBCUTANEOUS at 04:15

## 2020-08-09 RX ADMIN — HEPARIN SODIUM 5000 UNITS: 5000 INJECTION INTRAVENOUS; SUBCUTANEOUS at 21:15

## 2020-08-09 RX ADMIN — AMLODIPINE BESYLATE 10 MG: 10 TABLET ORAL at 07:59

## 2020-08-09 RX ADMIN — METOPROLOL TARTRATE 25 MG: 25 TABLET, FILM COATED ORAL at 07:59

## 2020-08-09 ASSESSMENT — PAIN SCALES - GENERAL
PAINLEVEL_OUTOF10: 0
PAINLEVEL_OUTOF10: 10
PAINLEVEL_OUTOF10: 0
PAINLEVEL_OUTOF10: 10
PAINLEVEL_OUTOF10: 0

## 2020-08-09 ASSESSMENT — PAIN DESCRIPTION - DIRECTION: RADIATING_TOWARDS: DOWN

## 2020-08-09 ASSESSMENT — PAIN DESCRIPTION - PAIN TYPE
TYPE: ACUTE PAIN
TYPE: ACUTE PAIN

## 2020-08-09 ASSESSMENT — PAIN DESCRIPTION - DESCRIPTORS
DESCRIPTORS: ACHING
DESCRIPTORS: ACHING

## 2020-08-09 ASSESSMENT — PAIN - FUNCTIONAL ASSESSMENT
PAIN_FUNCTIONAL_ASSESSMENT: ACTIVITIES ARE NOT PREVENTED
PAIN_FUNCTIONAL_ASSESSMENT: ACTIVITIES ARE NOT PREVENTED

## 2020-08-09 ASSESSMENT — PAIN DESCRIPTION - ONSET
ONSET: ON-GOING
ONSET: SUDDEN

## 2020-08-09 ASSESSMENT — PAIN DESCRIPTION - FREQUENCY
FREQUENCY: INTERMITTENT
FREQUENCY: INTERMITTENT

## 2020-08-09 ASSESSMENT — PAIN DESCRIPTION - PROGRESSION
CLINICAL_PROGRESSION: NOT CHANGED
CLINICAL_PROGRESSION: NOT CHANGED

## 2020-08-09 ASSESSMENT — PAIN DESCRIPTION - ORIENTATION
ORIENTATION: LEFT
ORIENTATION: LEFT

## 2020-08-09 ASSESSMENT — PAIN DESCRIPTION - LOCATION
LOCATION: RIB CAGE
LOCATION: RIB CAGE

## 2020-08-09 NOTE — PLAN OF CARE
Problem: Falls - Risk of:  Goal: Will remain free from falls  Description: Will remain free from falls  Outcome: Ongoing  Note: Pt is a High fall risk. See Philipp Ashley Fall Score and ABCDS Injury Risk assessments. Explained fall risk precautions to pt and family and rationale behind their use to keep the patient safe. Pt bed is in low position, side rails up, call light and belongings are in reach. Fall wristband applied and present on pts wrist.  Bed alarm on. Pt encouraged to call for assistance. Will continue with hourly rounds for PO intake, pain needs, toileting and repositioning as needed.

## 2020-08-09 NOTE — PROGRESS NOTES
([address] 2020    GERD (gastroesophageal reflux disease)     History of colonoscopy     Hyperlipidemia     Hypertension     Myocardial infarction, inferior wall [facility]) 2006       Past Surgical History:   Procedure Laterality Date    BREAST REDUCTION SURGERY  Bilat     SECTION  x3    CT BONE MARROW BIOPSY  2020    CT BONE MARROW BIOPSY 2020 [facility]J CT SCAN    HYSTERECTOMY      TYMPANOPLASTY         Family History   Problem Relation Age of Onset    Heart Disease Mother     High Cholesterol Mother     Heart Disease Sister         hypertension    Diabetes Sister         hypertension    High Cholesterol Sister         hypertension    Heart Disease Brother     High Cholesterol Brother     Stroke Brother         hypertension        reports that she has been smoking. She has a 22.50 pack-year smoking history. She has never used smokeless tobacco. She reports that she does not drink alcohol or use drugs. Allergies:  Patient has no known allergies.     Current Medications:    amLODIPine (NORVASC) tablet 10 mg, Daily  heparin (porcine) injection 5,000 Units, Q8H  atorvastatin (LIPITOR) tablet 40 mg, Nightly  metoprolol tartrate (LOPRESSOR) tablet 25 mg, BID  sodium chloride flush 0.9 % injection 10 mL, 2 times per day  sodium chloride flush 0.9 % injection 10 mL, PRN  acetaminophen (TYLENOL) tablet 650 mg, Q6H PRN    Or  acetaminophen (TYLENOL) suppository 650 mg, Q6H PRN  promethazine (PHENERGAN) tablet 12.5 mg, Q6H PRN    Or  ondansetron (ZOFRAN) injection 4 mg, Q6H PRN  glucose (GLUTOSE) 40 % oral gel 15 g, PRN  dextrose 50 % IV solution, PRN  glucagon (rDNA) injection 1 mg, PRN  dextrose 5 % solution, PRN            Physical exam:     Vitals:  BP (!) 163/68   Pulse 70   Temp 98.1 °F (36.7 °C) (Oral)   Resp 18   Ht 5' 5\" (1.651 m)   Wt 105 lb 9.6 oz (47.9 kg)   SpO2 99%   BMI 17.57 kg/m²     Constitutional:  awake, NAD  HEENT:  MMM  Neck: supple, no JVD  Cardiovascular: CVA, Porcelain gall bladder who presented with chief complaint of urinary retention.          WANDER on CKD 3  - suspect due to possible worsening of multiple myeloma - hx of myeloma kidney  - Cr increased to 3.0 from 1.1 in 7/2020 (Peak 12.0)  - no anion gap  - UA positive for: leukocyte esterase (small), protein (trace), epithelial cells, bact 1+  - good urinary output overnight  - monitor I/O  - prot/Cr ratio 1.48  - Cr better with IVF   - Very High risk for WANDER as her light chains are tubulotoxic   - She needed RRT sec to Myeloma kidney which recovered   -  IVF discontinued         Hyponatremia - now resolved  - resolved  - monitor Na,       Hypokalemia   - K 5.7 on admission  - supplement if low      HTN  - BP high normal  - on amlodipine, lopressor       Anemia  - Hgb 9.8 on admission --> 9.6 - at pt's baseline  - hx multiple myeloma  - monitor H/H  - hematology/oncology team following,           Thank you for allowing us to participate in care of Amor Rae free to contact    Sarah Ackerman  8/9/2020    [facility] [address]  Office : [phone]  Fax :[phone]

## 2020-08-09 NOTE — PROGRESS NOTES
Internal Medicine Progress Note PGY-3    Admit Date: 8/5/2020    CC: Unable to make urine    Interval history:   No acute events overnight. Pt seen at bedside resting comfortably. Plan is for liver bx tomorrow. Medications:     Scheduled Meds:   amLODIPine  10 mg Oral Daily    heparin (porcine)  5,000 Units Subcutaneous Q8H    atorvastatin  40 mg Oral Nightly    metoprolol tartrate  25 mg Oral BID    sodium chloride flush  10 mL Intravenous 2 times per day     Continuous Infusions:   dextrose       PRN Meds:sodium chloride flush, acetaminophen **OR** acetaminophen, promethazine **OR** ondansetron, glucose, dextrose, glucagon (rDNA), dextrose    Objective:     Vitals:   T-max:  Patient Vitals for the past 8 hrs:   BP Temp Temp src Pulse Resp SpO2 Weight   08/09/20 0756 (!) 163/68 98.1 °F (36.7 °C) Oral 70 18 99 % --   08/09/20 0754 -- -- -- -- -- -- 105 lb 9.6 oz (47.9 kg)   08/09/20 0345 (!) 164/61 98.3 °F (36.8 °C) Oral 77 18 98 % --       Intake/Output Summary (Last 24 hours) at 8/9/2020 1128  Last data filed at 8/9/2020 0756  Gross per 24 hour   Intake 429 ml   Output 1270 ml   Net -841 ml     Physical Exam   Constitutional: Patient is oriented to person, place, and time. Appears well-developed and well-nourished. No distress. HENT: Head, normocephalic and atraumatic. External ears normal.   Mouth/Throat: Oropharynx is clear and moist. No exudate or erythema  Eyes: Pupils are equal, round, and reactive to light. Conjunctivae and EOM are normal.   Cardiovascular: Regular rate and rythem with normal heart sounds and intact distal pulses. No murmurs, rubs, or gallops  Pulmonary/Chest: Effort normal and breath sounds normal. No respiratory distress. No wheezes, rhonchi, or rales   Abdominal: Soft, non tender, non distended with normal bowel sounds. No rebound or guarding.    Musculoskeletal: Normal range of motion with no extremity edema    LABS:    CBC:   Recent Labs     08/07/20  0556 08/08/20  7229 08/09/20  0002   WBC 4.2 4.2 4.3   HGB 9.6* 9.0* 9.2*   HCT 29.1* 27.5* 28.0*    168 174   MCV 88.7 89.5 88.2     Renal:    Recent Labs     08/07/20  0556  08/07/20  1802 08/07/20  2352 08/08/20  0628 08/08/20  1138 08/08/20  2049 08/09/20  0004 08/09/20  0408     --   --   --  139  --   --   --  136   K 3.8   < > 3.4* 3.4* 3.1*  --  3.9 3.8 3.8     --   --   --  106  --   --   --  106   CO2 18*  --   --   --  19*  --   --   --  19*   BUN 18  --   --   --  15  --   --   --  12   CREATININE 2.3*  --   --   --  1.7*  --   --   --  1.4*   GLUCOSE 90  --   --   --  77  --   --   --  91   CALCIUM 9.8  --   --   --  10.5  --   --   --  11.0*   MG  --   --  2.30 2.00  --  1.80  --   --   --    PHOS 3.3  --   --   --  3.8  --   --   --  3.2   ANIONGAP 14  --   --   --  14  --   --   --  11    < > = values in this interval not displayed. Hepatic:   Recent Labs     08/07/20 0556 08/08/20 0628 08/09/20 0408   LABALBU 3.0* 3.0* 3.2*     Troponin: No results for input(s): TROPONINI in the last 72 hours. BNP: No results for input(s): BNP in the last 72 hours. Lipids: No results for input(s): CHOL, HDL in the last 72 hours. Invalid input(s): LDLCALCU, TRIGLYCERIDE  ABGs:  No results for input(s): PHART, EVB6UQG, PO2ART, BJM9RRJ, BEART, THGBART, I5NFUCCP, VJL5RFN in the last 72 hours. INR: No results for input(s): INR in the last 72 hours. Lactate: No results for input(s): LACTATE in the last 72 hours. Cultures:  -----------------------------------------------------------------  RAD:   CT AP 8/6/2020  1. Development of hypodense lesion within liver likely relates to primary or metastatic neoplasm reduced since 2017 examination.   .         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 Clinical correlation suggested.         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  Vascular consult may be useful if indicated.         Marked colonic diverticulosis without diverticulitis.         No hydronephrosis         No change in left adrenal nodule         Marked lytic lesions noted consistent with patient's myeloma involving the bilateral ribs. Lower thoracic spine, lumbar spine, pelvis, sacrum and hips and proximal femurs with lytic lesion involving the left iliac bone with pathologic fracture with gap    involving the left iliac bone present previously. Assessment/Plan:   65 y.o. female with PMHx of Multiple myeloma, ESRD not on dialysis, HTN, CVA, Porcelain gall bladder who presented to [facility]. for un able to make urine of 1 day duration. WANDER on CKD, resolving   Pt has hx of ESRD, s/p dialysis, p/w un uria,  Cr 2.9, K 5.7. K has improved after lasix and fluids. . She has produced ample urine since admission. Patient appears to have been fluid down causing lack of urination and increased Cr. CT AP showed no evidence of obstruction. Concern for myeloma nephropathy. Urea excretion 61%. Lambda FLC to 878 with K FLC to 9.47. 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 Patient K improved after lasix and fluids.      Hyponatremia, resolved  Na 126 , from 139  Improved to 136 this morning. Urine osmo low at 268, urine K 7.7, and urine chloride 118. Urine studies show evidence of intrnisc renal disease with fractional excretion of urea of 61%. -Daily BMP     Multiple myloma  Hx of multiple myloma, no sign of infection, WANDER on ckd.  WANDER likely due to myeloma and has increased lambda FLC to 827 with kappa to 9.47.  New liver lesion on CT AP concerning for myeloma vs cholangiocarcinoma with hx of porcelain gallbladder.   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

## 2020-08-10 LAB
ALBUMIN SERPL-MCNC: 2.7 G/DL (ref 3.1–4.9)
ALBUMIN SERPL-MCNC: 3.4 G/DL (ref 3.4–5)
ALPHA-1-GLOBULIN: 0.3 G/DL (ref 0.2–0.4)
ALPHA-2-GLOBULIN: 0.9 G/DL (ref 0.4–1.1)
ANION GAP SERPL CALCULATED.3IONS-SCNC: 15 MMOL/L (ref 3–16)
BASOPHILS ABSOLUTE: 0 K/UL (ref 0–0.2)
BASOPHILS RELATIVE PERCENT: 0.7 %
BETA GLOBULIN: 1.9 G/DL (ref 0.9–1.6)
BUN BLDV-MCNC: 13 MG/DL (ref 7–20)
CALCIUM SERPL-MCNC: 11.7 MG/DL (ref 8.3–10.6)
CHLORIDE BLD-SCNC: 103 MMOL/L (ref 99–110)
CO2: 20 MMOL/L (ref 21–32)
CREAT SERPL-MCNC: 1.2 MG/DL (ref 0.6–1.2)
EOSINOPHILS ABSOLUTE: 0.1 K/UL (ref 0–0.6)
EOSINOPHILS RELATIVE PERCENT: 1.2 %
GAMMA GLOBULIN: 0.3 G/DL (ref 0.6–1.8)
GFR AFRICAN AMERICAN: 53
GFR NON-AFRICAN AMERICAN: 44
GLUCOSE BLD-MCNC: 79 MG/DL (ref 70–99)
HCT VFR BLD CALC: 30.1 % (ref 36–48)
HEMOGLOBIN: 9.7 G/DL (ref 12–16)
LYMPHOCYTES ABSOLUTE: 0.9 K/UL (ref 1–5.1)
LYMPHOCYTES RELATIVE PERCENT: 16.4 %
M SPIKE 1 SERUM PROTEIN ELEC: 0.9 G/DL
MCH RBC QN AUTO: 29 PG (ref 26–34)
MCHC RBC AUTO-ENTMCNC: 32.4 G/DL (ref 31–36)
MCV RBC AUTO: 89.4 FL (ref 80–100)
MONOCYTES ABSOLUTE: 0.8 K/UL (ref 0–1.3)
MONOCYTES RELATIVE PERCENT: 14.2 %
NEUTROPHILS ABSOLUTE: 3.9 K/UL (ref 1.7–7.7)
NEUTROPHILS RELATIVE PERCENT: 67.5 %
PDW BLD-RTO: 19.9 % (ref 12.4–15.4)
PHOSPHORUS: 4 MG/DL (ref 2.5–4.9)
PLATELET # BLD: 204 K/UL (ref 135–450)
PMV BLD AUTO: 8.6 FL (ref 5–10.5)
POTASSIUM SERPL-SCNC: 4.6 MMOL/L (ref 3.5–5.1)
RBC # BLD: 3.37 M/UL (ref 4–5.2)
SODIUM BLD-SCNC: 138 MMOL/L (ref 136–145)
SPE/IFE INTERPRETATION: NORMAL
TOTAL PROTEIN: 6 G/DL (ref 6.4–8.2)
URINE ELECTROPHORESIS INTERP: NORMAL
WBC # BLD: 5.7 K/UL (ref 4–11)

## 2020-08-10 PROCEDURE — 6370000000 HC RX 637 (ALT 250 FOR IP): Performed by: STUDENT IN AN ORGANIZED HEALTH CARE EDUCATION/TRAINING PROGRAM

## 2020-08-10 PROCEDURE — 6360000002 HC RX W HCPCS: Performed by: INTERNAL MEDICINE

## 2020-08-10 PROCEDURE — 99231 SBSQ HOSP IP/OBS SF/LOW 25: CPT | Performed by: INTERNAL MEDICINE

## 2020-08-10 PROCEDURE — 85025 COMPLETE CBC W/AUTO DIFF WBC: CPT

## 2020-08-10 PROCEDURE — 2060000000 HC ICU INTERMEDIATE R&B

## 2020-08-10 PROCEDURE — 6360000002 HC RX W HCPCS: Performed by: STUDENT IN AN ORGANIZED HEALTH CARE EDUCATION/TRAINING PROGRAM

## 2020-08-10 PROCEDURE — 2580000003 HC RX 258: Performed by: INTERNAL MEDICINE

## 2020-08-10 PROCEDURE — 80069 RENAL FUNCTION PANEL: CPT

## 2020-08-10 PROCEDURE — 2580000003 HC RX 258: Performed by: STUDENT IN AN ORGANIZED HEALTH CARE EDUCATION/TRAINING PROGRAM

## 2020-08-10 RX ADMIN — HEPARIN SODIUM 5000 UNITS: 5000 INJECTION INTRAVENOUS; SUBCUTANEOUS at 17:17

## 2020-08-10 RX ADMIN — ATORVASTATIN CALCIUM 40 MG: 40 TABLET, FILM COATED ORAL at 19:30

## 2020-08-10 RX ADMIN — METOPROLOL TARTRATE 25 MG: 25 TABLET, FILM COATED ORAL at 11:35

## 2020-08-10 RX ADMIN — PAMIDRONATE DISODIUM 30 MG: 3 INJECTION, SOLUTION INTRAVENOUS at 11:42

## 2020-08-10 RX ADMIN — Medication 10 ML: at 11:36

## 2020-08-10 RX ADMIN — AMLODIPINE BESYLATE 10 MG: 10 TABLET ORAL at 11:35

## 2020-08-10 RX ADMIN — Medication 10 ML: at 19:30

## 2020-08-10 RX ADMIN — HEPARIN SODIUM 5000 UNITS: 5000 INJECTION INTRAVENOUS; SUBCUTANEOUS at 04:15

## 2020-08-10 RX ADMIN — METOPROLOL TARTRATE 25 MG: 25 TABLET, FILM COATED ORAL at 19:30

## 2020-08-10 ASSESSMENT — PAIN SCALES - GENERAL
PAINLEVEL_OUTOF10: 0

## 2020-08-10 NOTE — PROGRESS NOTES
NUTRITION ASSESSMENT  Admission Date: 8/5/2020     Type and Reason for Visit: Reassess    NUTRITION RECOMMENDATIONS:   1. PO Diet: Recommend modify to general low potassium to aid in po intakes   2. ONS: Start Nepro BID    3. Encourage po intakes     NUTRITION ASSESSMENT:  Nutrition follow up; Pt is currently NPO for pending liver bpx; not available at RD visit. I/O reveal inconsistent PO intake, ranging from 1-75%, thus not meeting pt nutrition needs. RD will monitor for diet advance and po intake adequacy; ONS tolerance. MALNUTRITION ASSESSMENT  Context of Malnutrition: Acute Illness   Malnutrition Status: At risk for malnutrition (Comment) (suspect mod/severe malnutrition)  Findings of the 6 clinical characteristics of malnutrition (Minimum of 2 out of 6 clinical characteristics is required to make the diagnosis of moderate or severe Protein Calorie Malnutrition based on AND/ASPEN Guidelines):  Energy Intake: Less than/equal to 50% of estimated energy requirements    Energy Intake Time: x1 day   Weight Loss %: 10% loss or greater    Weight loss Time: Greater than or equal to 3 months   Body Fat Loss: Unable to Assess   Body Fat Location: Unable to assess    Body Muscle Loss: Unable to Assess   Body Muscle Loss Location: Unable to assess    Fluid Accumulation: No significant    Fluid Accumulation Location: No significant     Strength: Not Performed;  Not Measured     NUTRITION DIAGNOSIS   Problem: Problem #1: Underweight  Etiology: Insufficient energy/nutrient consumption  Signs & Symptoms: Intake 0-25% and Weight loss     NUTRITION INTERVENTION  Food and/or Nutrient Delivery:Continue NPO  Nutrition education/counseling/coordination of care: Continue Inpatient Monitoring     NUTRITION MONITORING & EVALUATION:  Evaluation:No progress towards goal   Goals:Goals: Pt will consume >/=50% of meals and ONS this admission   Monitoring: Meal Intake , Pertinent Labs , Supplement Intake  or Weight      OBJECTIVE DATA:  · Nutrition-Focused Physical Findings: no edema;   · Wounds None      Past Medical History:   Diagnosis Date    Allergic rhinitis     Anemia     CAD (coronary artery disease)     CAD (coronary artery disease)     Diverticulosis     ESRD (end stage renal disease) on dialysis ([facility] 4/24/2020    GERD (gastroesophageal reflux disease)     History of colonoscopy 2002    Hyperlipidemia     Hypertension     Myocardial infarction, inferior wall ([facility] January 2006        ANTHROPOMETRICS  Current Height: 5' 5\" (165.1 cm)  Current Weight: 105 lb 13.1 oz (48 kg)    Admission weight: 110 lb 3.7 oz (50 kg)  Ideal Bodyweight 125 lb    Usual Bodyweight ~130 lb    Weight Changes  -16%, 21 lb x 3 months       BMI BMI (Calculated): 17.6    Wt Readings from Last 50 Encounters:   08/06/20 110 lb 3.7 oz (50 kg)   07/28/20 114 lb 9.6 oz (52 kg)   06/03/20 129 lb (58.5 kg)   05/26/20 131 lb 6.4 oz (59.6 kg)   04/07/20 120 lb 5.9 oz (54.6 kg)   03/30/20 127 lb (57.6 kg)     COMPARATIVE STANDARDS  Estimated Total Kcals/Day : 30-35 Current Bodyweight (50 kg) 1897-7805 kcal    Estimated Total Protein (g/day) : 1.5-2.0 Current Bodyweight (50 kg)  g/day  Estimated Daily Total Fluid (ml/day): 1 mL/kcal per day     Food / Nutrition-Related History  Pre-Admission / Home Diet:  Pre-Admission/Home Diet: General   Home Supplements / Herbals:    none noted  Food Restrictions / Cultural Requests:    none noted    Diet Orders / Intake / Nutrition Support  Current diet/supplement order: Dietary Nutrition Supplements: Renal Oral Supplement  Diet NPO, After Midnight  DIET RENAL;     NSG Recorded PO:   PO Fluids P.O.: 120 mL(cranberry juice)  PO Meals PO Meals Eaten (%): 0   PO Intake: NPO  PO Supplement: NPO   PO Supplement Intake: NPO  IVF: none     NUTRITION RISK LEVEL: Risk Level: High     Diego Martínez RD, LD  Pj:  [phone]  Office:  [phone]

## 2020-08-10 NOTE — PLAN OF CARE
Problem: Falls - Risk of:  Goal: Will remain free from falls  Description: Will remain free from falls  Outcome: Ongoing  Note: Pt is a High fall risk. See Frankie Mono Fall Score and ABCDS Injury Risk assessments. Explained fall risk precautions to pt and family and rationale behind their use to keep the patient safe. Pt bed is in low position, side rails up, call light and belongings are in reach. Fall wristband applied and present on pts wrist.  Bed alarm on. Pt encouraged to call for assistance. Will continue with hourly rounds for PO intake, pain needs, toileting and repositioning as needed.

## 2020-08-10 NOTE — PROGRESS NOTES
Nephrology  Note                                                                                                                                                                                                                                                                                                                                                               Office : [phone]     Fax :[phone]              Patient's Name: Leodan Benitez  8/10/2020    Reason for Consult:  WANDER on CKD      History of Present Ilness:    Leodan Benitez is a 65 y.o. female with Multiple myeloma, CKD 3 not on dialysis, HTN, CVA, Porcelain gall bladder who presents with chief complaint of urinary retention. Patient reports that in the past 24 hours she has not urinated. Pt has multiple Myeloma on A acyclovir, PPI. Yesterday she noticed that her diaper is dry and she did not void all day. This is abnormal for her and has never happened in the past.  She denies starting any new medications. She does have urinary urgency. There is no associated abdominal pain. She denies any fever or chills. She does have good p.o. intake today as she has drinking approximately 36 ounces of fluids. Yesterday she voided approximately 4-5 times. She recently had a tunneled dialysis catheter placed which is now removed. During that time she was still voiding well. She denies any lower extremity swelling. [place] is placed in the ER and she has only made 100 cc of urine. Patient has history of myeloma kidney and was on dialysis. Her kidney function did get better with the chemo. She was off dialysis.     Patient has new onset WANDER.     INTERVAL HISTORY    Feels better  Shortness of breath: No   UOP: Good   Creat: trending down  Ca worsening      Past Medical History:   Diagnosis Date    Allergic rhinitis     Anemia     CAD (coronary artery disease)     CAD (coronary artery disease)     Diverticulosis     ESRD (end stage renal disease) on dialysis ([facility] [address] 2020    GERD (gastroesophageal reflux disease)     History of colonoscopy     Hyperlipidemia     Hypertension     Myocardial infarction, inferior wall [facility]) 2006       Past Surgical History:   Procedure Laterality Date    BREAST REDUCTION SURGERY  Bilat     SECTION  x3    CT BONE MARROW BIOPSY  2020    CT BONE MARROW BIOPSY 2020 [facility] CT SCAN    HYSTERECTOMY      TYMPANOPLASTY         Family History   Problem Relation Age of Onset    Heart Disease Mother     High Cholesterol Mother     Heart Disease Sister         hypertension    Diabetes Sister         hypertension    High Cholesterol Sister         hypertension    Heart Disease Brother     High Cholesterol Brother     Stroke Brother         hypertension        reports that she has been smoking. She has a 22.50 pack-year smoking history. She has never used smokeless tobacco. She reports that she does not drink alcohol or use drugs. Allergies:  Patient has no known allergies.     Current Medications:    pamidronate (AREDIA) 30 mg in sodium chloride 0.9 % 500 mL infusion, Once  amLODIPine (NORVASC) tablet 10 mg, Daily  heparin (porcine) injection 5,000 Units, Q8H  atorvastatin (LIPITOR) tablet 40 mg, Nightly  metoprolol tartrate (LOPRESSOR) tablet 25 mg, BID  sodium chloride flush 0.9 % injection 10 mL, 2 times per day  sodium chloride flush 0.9 % injection 10 mL, PRN  acetaminophen (TYLENOL) tablet 650 mg, Q6H PRN    Or  acetaminophen (TYLENOL) suppository 650 mg, Q6H PRN  promethazine (PHENERGAN) tablet 12.5 mg, Q6H PRN    Or  ondansetron (ZOFRAN) injection 4 mg, Q6H PRN  glucose (GLUTOSE) 40 % oral gel 15 g, PRN  dextrose 50 % IV solution, PRN  glucagon (rDNA) injection 1 mg, PRN  dextrose 5 % solution, PRN            Physical exam:     Vitals:  /65   Pulse 89   Temp 99 °F (37.2 °C) (Oral)   Resp 21   Ht 5' 5\" (1.651 m)   Wt 105 lb 13.1 oz (48 kg)   SpO2 97%   BMI 17.61 kg/m²     Constitutional:  awake, NAD  HEENT:  MMM  Neck: supple, no JVD  Cardiovascular:  S1, S2 reg  Respiratory: CTA  Abdomen:  +BS, soft, ND  Ext: no lower extremity edema  Skin: dry/intact  CNS: alert, no agitation       Data:   Labs:  CBC:   Recent Labs     08/08/20  0628 08/09/20  0002 08/10/20  0440   WBC 4.2 4.3 5.7   HGB 9.0* 9.2* 9.7*    174 204     BMP:    Recent Labs     08/08/20  0628  08/09/20  0004 08/09/20  0408 08/10/20  0440     --   --  136 138   K 3.1*   < > 3.8 3.8 4.6     --   --  106 103   CO2 19*  --   --  19* 20*   BUN 15  --   --  12 13   CREATININE 1.7*  --   --  1.4* 1.2   GLUCOSE 77  --   --  91 79    < > = values in this interval not displayed. Ca/Mg/Phos:   Recent Labs     08/07/20  1802 08/07/20  2352 08/08/20  0628 08/08/20  1138 08/09/20  0408 08/10/20  0440   CALCIUM  --   --  10.5  --  11.0* 11.7*   MG 2.30 2.00  --  1.80  --   --    PHOS  --   --  3.8  --  3.2 4.0     Hepatic: No results for input(s): AST, ALT, ALB, BILITOT, ALKPHOS in the last 72 hours. Troponin: No results for input(s): TROPONINI in the last 72 hours. BNP: No results for input(s): BNP in the last 72 hours. Lipids: No results for input(s): CHOL, TRIG, HDL, LDLCALC, LABVLDL in the last 72 hours. ABGs: No results for input(s): PHART, PO2ART, AQV2GAS in the last 72 hours. INR: No results for input(s): INR in the last 72 hours. UA:  No results for input(s): Maple Public, GLUCOSEU, BILIRUBINUR, KETUA, SPECGRAV, BLOODU, PHUR, PROTEINU, UROBILINOGEN, NITRU, LEUKOCYTESUR, Boris Nelli in the last 72 hours. Urine Microscopic:   No results for input(s): LABCAST, BACTERIA, COMU, HYALCAST, WBCUA, RBCUA, EPIU in the last 72 hours. Urine Culture: No results for input(s): LABURIN in the last 72 hours. Urine Chemistry:   No results for input(s): Frankey Dills, PROTEINUR, NAUR in the last 72 hours.           IMAGING:      Assessment/Plan   Sergei Landry is a 65 y.o. female with Multiple myeloma, CKD 3 not on dialysis, HTN, CVA, Porcelain gall bladder who presented with chief complaint of urinary retention.          WANDER on CKD 3  - suspect due to possible worsening of multiple myeloma - hx of myeloma kidney  - Cr increased to 3.0 from 1.1 in 7/2020 (Peak 12.0)  - no anion gap  - UA positive for: leukocyte esterase (small), protein (trace), epithelial cells, bact 1+  - good urinary output overnight  - monitor I/O  - prot/Cr ratio 1.48  - Cr better with IVF   - Very High risk for WANDER as her light chains are tubulotoxic   - She needed RRT sec to Myeloma kidney which recovered   -  IVF discontinued     Hypercalcemia - sec to MM   - pamidronate x 1 - 8/10    Hyponatremia - now resolved  - resolved  - monitor Na,       Hypokalemia   - K 5.7 on admission  - supplement if low      HTN  - BP high normal  - on amlodipine, lopressor       Anemia  - Hgb 9.8 on admission --> 9.6 - at pt's baseline  - hx multiple myeloma  - monitor H/H  - hematology/oncology team following,           Thank you for allowing us to participate in care of Amor Rae free to contact    Xochilt Ibarra  8/10/2020    [facility] [address]  Office : [phone]  Fax :[phone]

## 2020-08-10 NOTE — PLAN OF CARE
Problem: Falls - Risk of:  Goal: Will remain free from falls  Description: Will remain free from falls  Outcome: Ongoing   Patient remained free of falls during shift. Patient is a Ramirez Fall Risk: High (45 and higher); uses call light appropriately, ambulates with a standby assist, Will continue to encourage ambulation and implement POC. Call light within reach and hourly rounding in place. Problem: Nutrition  Goal: Optimal nutrition therapy  Outcome: Ongoing  Pt has low appetite. Encouraged to eat small meals.   She is NPO after midnight    Problem: Pain:  Goal: Pain level will decrease  Description: Pain level will decrease  Outcome: Ongoing  No complaint of pain for this shift

## 2020-08-10 NOTE — PROGRESS NOTES
Internal Medicine Progress Note PGY-1    Admit Date: 8/5/2020    CC: Unable to make urine    Interval history:   No acute events overnight. Pt seen at bedside resting comfortably. Plan was for liver bx today but will have to be tomorrow due to scheduling. Medications:     Scheduled Meds:   pamidronate (AREDIA) IVPB  30 mg Intravenous Once    amLODIPine  10 mg Oral Daily    heparin (porcine)  5,000 Units Subcutaneous Q8H    atorvastatin  40 mg Oral Nightly    metoprolol tartrate  25 mg Oral BID    sodium chloride flush  10 mL Intravenous 2 times per day     Continuous Infusions:   dextrose       PRN Meds:sodium chloride flush, acetaminophen **OR** acetaminophen, promethazine **OR** ondansetron, glucose, dextrose, glucagon (rDNA), dextrose    Objective:     Vitals:   T-max:  Patient Vitals for the past 8 hrs:   BP Temp Temp src Pulse Resp SpO2 Weight   08/10/20 1133 (!) 140/67 -- -- 99 16 -- --   08/10/20 1057 (!) 143/72 97.9 °F (36.6 °C) Oral 96 12 96 % --   08/10/20 0928 -- -- -- -- -- -- 105 lb 13.1 oz (48 kg)   08/10/20 0851 135/65 99 °F (37.2 °C) Oral 89 21 97 % --       Intake/Output Summary (Last 24 hours) at 8/10/2020 1431  Last data filed at 8/10/2020 1345  Gross per 24 hour   Intake --   Output 550 ml   Net -550 ml     Physical Exam   Constitutional: Patient is oriented to person, place, and time. Appears well-developed and well-nourished. No distress. HENT: Head, normocephalic and atraumatic. External ears normal.   Mouth/Throat: Oropharynx is clear and moist. No exudate or erythema  Eyes: Pupils are equal, round, and reactive to light. Conjunctivae and EOM are normal.   Cardiovascular: Regular rate and rythem with normal heart sounds and intact distal pulses. No murmurs, rubs, or gallops  Pulmonary/Chest: Effort normal and breath sounds normal. No respiratory distress. No wheezes, rhonchi, or rales   Abdominal: Soft, non tender, non distended with normal bowel sounds. No rebound or guarding. Musculoskeletal: Normal range of motion with no extremity edema    LABS:    CBC:   Recent Labs     08/08/20 0628 08/09/20  0002 08/10/20  0440   WBC 4.2 4.3 5.7   HGB 9.0* 9.2* 9.7*   HCT 27.5* 28.0* 30.1*    174 204   MCV 89.5 88.2 89.4     Renal:    Recent Labs     08/07/20  1802 08/07/20  2352 08/08/20 0628 08/08/20  1138  08/09/20  0004 08/09/20  0408 08/10/20  0440   NA  --   --  139  --   --   --  136 138   K 3.4* 3.4* 3.1*  --    < > 3.8 3.8 4.6   CL  --   --  106  --   --   --  106 103   CO2  --   --  19*  --   --   --  19* 20*   BUN  --   --  15  --   --   --  12 13   CREATININE  --   --  1.7*  --   --   --  1.4* 1.2   GLUCOSE  --   --  77  --   --   --  91 79   CALCIUM  --   --  10.5  --   --   --  11.0* 11.7*   MG 2.30 2.00  --  1.80  --   --   --   --    PHOS  --   --  3.8  --   --   --  3.2 4.0   ANIONGAP  --   --  14  --   --   --  11 15    < > = values in this interval not displayed. Hepatic:   Recent Labs     08/08/20  0628 08/09/20  0408 08/10/20  0440   LABALBU 3.0* 3.2* 3.4     Troponin: No results for input(s): TROPONINI in the last 72 hours. BNP: No results for input(s): BNP in the last 72 hours. Lipids: No results for input(s): CHOL, HDL in the last 72 hours. Invalid input(s): LDLCALCU, TRIGLYCERIDE  ABGs:  No results for input(s): PHART, VPG0OOS, PO2ART, KHX1YSZ, BEART, THGBART, E0JDGNMP, JLD2FPV in the last 72 hours. INR: No results for input(s): INR in the last 72 hours. Lactate: No results for input(s): LACTATE in the last 72 hours. Cultures:  -----------------------------------------------------------------  RAD:   CT AP 8/6/2020  1. Development of hypodense lesion within liver likely relates to primary or metastatic neoplasm reduced since 2017 examination.   .         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  Clinical correlation suggested.         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 Vascular consult may be useful if indicated.         Marked colonic diverticulosis without diverticulitis.         No hydronephrosis         No change in left adrenal nodule         Marked lytic lesions noted consistent with patient's myeloma involving the bilateral ribs. Lower thoracic spine, lumbar spine, pelvis, sacrum and hips and proximal femurs with lytic lesion involving the left iliac bone with pathologic fracture with gap    involving the left iliac bone present previously. Assessment/Plan:   65 y.o. female with PMHx of Multiple myeloma, ESRD not on dialysis, HTN, CVA, Porcelain gall bladder who presented to [facility] [employer]. for un able to make urine of 1 day duration. WANDER on CKD, resolving   Pt has hx of ESRD, s/p dialysis, p/w un uria,  Cr 2.9, K 5.7. K has improved after lasix and fluids. . She has produced ample urine since admission. Patient appears to have been fluid down causing lack of urination and increased Cr. CT AP showed no evidence of obstruction. Concern for myeloma nephropathy. Urea excretion 61%. Lambda FLC to 878 with K FLC to 9.47. Cr improved to 1.4 today  -IVF discontinued  -Nephrology consult,likely myeloma nephropathy  -Oncology consult, likely active disease, liver biopsy and considering adjusting therapy  - holding acyclovir for now, will restart soon  -UPEP, SPEP   - K/L ratio 0.01 with lambda 878 and kappa 9.47 suggestive myeloma is active     Hypertension  - continue metoprolol  - amlodipine increased to 10mg qD    Hyperkalemia, resolved  K 5.7 from 2.7(10 days ago) in setting of Un uria and WANDER. Patient K improved after lasix and fluids.      Hyponatremia, resolved  Na 126 , from 139  Improved to 136 this morning. Urine osmo low at 268, urine K 7.7, and urine chloride 118. Urine studies show evidence of intrnisc renal disease with fractional excretion of urea of 61%.    -Daily BMP     Multiple myloma  Hx of multiple myloma, no sign of infection, WANDER on ckd. WANDER likely due to myeloma and has increased lambda FLC to 827 with kappa to 9.47.  New liver lesion on CT AP concerning for myeloma vs cholangiocarcinoma with hx of porcelain gallbladder.   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

## 2020-08-11 ENCOUNTER — APPOINTMENT (OUTPATIENT)
Dept: CT IMAGING | Age: 70
DRG: 841 | End: 2020-08-11
Payer: COMMERCIAL

## 2020-08-11 LAB
24HR URINE VOLUME (ML): 2200 ML
ALBUMIN SERPL-MCNC: 3.5 G/DL (ref 3.4–5)
ANION GAP SERPL CALCULATED.3IONS-SCNC: 14 MMOL/L (ref 3–16)
BASOPHILS ABSOLUTE: 0 K/UL (ref 0–0.2)
BASOPHILS RELATIVE PERCENT: 0.7 %
BUN BLDV-MCNC: 16 MG/DL (ref 7–20)
CALCIUM SERPL-MCNC: 12.4 MG/DL (ref 8.3–10.6)
CHLORIDE BLD-SCNC: 105 MMOL/L (ref 99–110)
CO2: 22 MMOL/L (ref 21–32)
CREAT SERPL-MCNC: 1.1 MG/DL (ref 0.6–1.2)
EKG ATRIAL RATE: 74 BPM
EKG DIAGNOSIS: NORMAL
EKG P AXIS: 81 DEGREES
EKG P-R INTERVAL: 162 MS
EKG Q-T INTERVAL: 364 MS
EKG QRS DURATION: 94 MS
EKG QTC CALCULATION (BAZETT): 404 MS
EKG R AXIS: 33 DEGREES
EKG T AXIS: 63 DEGREES
EKG VENTRICULAR RATE: 74 BPM
EOSINOPHILS ABSOLUTE: 0.1 K/UL (ref 0–0.6)
EOSINOPHILS RELATIVE PERCENT: 1 %
GFR AFRICAN AMERICAN: 59
GFR NON-AFRICAN AMERICAN: 49
GLUCOSE BLD-MCNC: 97 MG/DL (ref 70–99)
HCT VFR BLD CALC: 29.5 % (ref 36–48)
HEMOGLOBIN: 9.6 G/DL (ref 12–16)
LYMPHOCYTES ABSOLUTE: 0.9 K/UL (ref 1–5.1)
LYMPHOCYTES RELATIVE PERCENT: 13.6 %
M SPIKE 1 24HR URINE PROTEIN ELEC: 0.06 G/DL
MCH RBC QN AUTO: 28.9 PG (ref 26–34)
MCHC RBC AUTO-ENTMCNC: 32.7 G/DL (ref 31–36)
MCV RBC AUTO: 88.4 FL (ref 80–100)
MONOCYTES ABSOLUTE: 0.7 K/UL (ref 0–1.3)
MONOCYTES RELATIVE PERCENT: 9.8 %
NEUTROPHILS ABSOLUTE: 5.1 K/UL (ref 1.7–7.7)
NEUTROPHILS RELATIVE PERCENT: 74.9 %
PDW BLD-RTO: 19.5 % (ref 12.4–15.4)
PHOSPHORUS: 2.9 MG/DL (ref 2.5–4.9)
PLATELET # BLD: 190 K/UL (ref 135–450)
PMV BLD AUTO: 8.2 FL (ref 5–10.5)
POTASSIUM SERPL-SCNC: 3.4 MMOL/L (ref 3.5–5.1)
PROTEIN 24 HOUR URINE: 1.56 G/24HR
RBC # BLD: 3.33 M/UL (ref 4–5.2)
SODIUM BLD-SCNC: 141 MMOL/L (ref 136–145)
WBC # BLD: 6.8 K/UL (ref 4–11)

## 2020-08-11 PROCEDURE — 88307 TISSUE EXAM BY PATHOLOGIST: CPT

## 2020-08-11 PROCEDURE — 6360000002 HC RX W HCPCS: Performed by: STUDENT IN AN ORGANIZED HEALTH CARE EDUCATION/TRAINING PROGRAM

## 2020-08-11 PROCEDURE — 88365 INSITU HYBRIDIZATION (FISH): CPT

## 2020-08-11 PROCEDURE — 88341 IMHCHEM/IMCYTCHM EA ADD ANTB: CPT

## 2020-08-11 PROCEDURE — 93010 ELECTROCARDIOGRAM REPORT: CPT | Performed by: INTERNAL MEDICINE

## 2020-08-11 PROCEDURE — 6360000002 HC RX W HCPCS: Performed by: INTERNAL MEDICINE

## 2020-08-11 PROCEDURE — 80069 RENAL FUNCTION PANEL: CPT

## 2020-08-11 PROCEDURE — 2060000000 HC ICU INTERMEDIATE R&B

## 2020-08-11 PROCEDURE — 6370000000 HC RX 637 (ALT 250 FOR IP): Performed by: STUDENT IN AN ORGANIZED HEALTH CARE EDUCATION/TRAINING PROGRAM

## 2020-08-11 PROCEDURE — 0FB03ZX EXCISION OF LIVER, PERCUTANEOUS APPROACH, DIAGNOSTIC: ICD-10-PCS | Performed by: RADIOLOGY

## 2020-08-11 PROCEDURE — 6360000002 HC RX W HCPCS: Performed by: RADIOLOGY

## 2020-08-11 PROCEDURE — 2709999900 CT NEEDLE BIOPSY LIVER PERCUTANEOUS

## 2020-08-11 PROCEDURE — 88342 IMHCHEM/IMCYTCHM 1ST ANTB: CPT

## 2020-08-11 PROCEDURE — 2580000003 HC RX 258: Performed by: STUDENT IN AN ORGANIZED HEALTH CARE EDUCATION/TRAINING PROGRAM

## 2020-08-11 PROCEDURE — 85025 COMPLETE CBC W/AUTO DIFF WBC: CPT

## 2020-08-11 PROCEDURE — 2500000003 HC RX 250 WO HCPCS: Performed by: RADIOLOGY

## 2020-08-11 PROCEDURE — 93005 ELECTROCARDIOGRAM TRACING: CPT | Performed by: STUDENT IN AN ORGANIZED HEALTH CARE EDUCATION/TRAINING PROGRAM

## 2020-08-11 PROCEDURE — 99232 SBSQ HOSP IP/OBS MODERATE 35: CPT | Performed by: INTERNAL MEDICINE

## 2020-08-11 RX ORDER — FENTANYL CITRATE 50 UG/ML
50 INJECTION, SOLUTION INTRAMUSCULAR; INTRAVENOUS ONCE
Status: COMPLETED | OUTPATIENT
Start: 2020-08-11 | End: 2020-08-11

## 2020-08-11 RX ORDER — SODIUM CHLORIDE 9 MG/ML
INJECTION, SOLUTION INTRAVENOUS CONTINUOUS
Status: DISCONTINUED | OUTPATIENT
Start: 2020-08-11 | End: 2020-08-11 | Stop reason: HOSPADM

## 2020-08-11 RX ORDER — POTASSIUM CHLORIDE 7.45 MG/ML
10 INJECTION INTRAVENOUS
Status: DISCONTINUED | OUTPATIENT
Start: 2020-08-11 | End: 2020-08-11

## 2020-08-11 RX ORDER — MIDAZOLAM HYDROCHLORIDE 1 MG/ML
1 INJECTION INTRAMUSCULAR; INTRAVENOUS ONCE
Status: COMPLETED | OUTPATIENT
Start: 2020-08-11 | End: 2020-08-11

## 2020-08-11 RX ORDER — DEXAMETHASONE 4 MG/1
20 TABLET ORAL DAILY
Status: DISCONTINUED | OUTPATIENT
Start: 2020-08-11 | End: 2020-08-12

## 2020-08-11 RX ORDER — POTASSIUM CHLORIDE 20 MEQ/1
40 TABLET, EXTENDED RELEASE ORAL ONCE
Status: COMPLETED | OUTPATIENT
Start: 2020-08-11 | End: 2020-08-11

## 2020-08-11 RX ORDER — CALCITONIN SALMON 200 [USP'U]/ML
200 INJECTION, SOLUTION INTRAMUSCULAR; SUBCUTANEOUS 2 TIMES DAILY
Status: COMPLETED | OUTPATIENT
Start: 2020-08-11 | End: 2020-08-11

## 2020-08-11 RX ADMIN — POTASSIUM CHLORIDE 40 MEQ: 1500 TABLET, EXTENDED RELEASE ORAL at 09:50

## 2020-08-11 RX ADMIN — ATORVASTATIN CALCIUM 40 MG: 40 TABLET, FILM COATED ORAL at 21:25

## 2020-08-11 RX ADMIN — LIDOCAINE HYDROCHLORIDE 5 ML: 20 INJECTION, SOLUTION EPIDURAL; INFILTRATION; INTRACAUDAL; PERINEURAL at 10:37

## 2020-08-11 RX ADMIN — DEXAMETHASONE 20 MG: 4 TABLET ORAL at 12:50

## 2020-08-11 RX ADMIN — METOPROLOL TARTRATE 25 MG: 25 TABLET, FILM COATED ORAL at 09:50

## 2020-08-11 RX ADMIN — Medication 10 ML: at 21:26

## 2020-08-11 RX ADMIN — CALCITONIN SALMON 200 UNITS: 200 INJECTION, SOLUTION INTRAMUSCULAR; SUBCUTANEOUS at 12:42

## 2020-08-11 RX ADMIN — FENTANYL CITRATE 50 MCG: 50 INJECTION, SOLUTION INTRAMUSCULAR; INTRAVENOUS at 10:39

## 2020-08-11 RX ADMIN — AMLODIPINE BESYLATE 10 MG: 10 TABLET ORAL at 09:50

## 2020-08-11 RX ADMIN — METOPROLOL TARTRATE 25 MG: 25 TABLET, FILM COATED ORAL at 21:25

## 2020-08-11 RX ADMIN — Medication 10 ML: at 09:00

## 2020-08-11 RX ADMIN — SODIUM CHLORIDE: 9 INJECTION, SOLUTION INTRAVENOUS at 08:18

## 2020-08-11 RX ADMIN — POTASSIUM CHLORIDE 10 MEQ: 10 INJECTION, SOLUTION INTRAVENOUS at 08:18

## 2020-08-11 RX ADMIN — MIDAZOLAM HYDROCHLORIDE 1 MG: 1 INJECTION, SOLUTION INTRAMUSCULAR; INTRAVENOUS at 10:38

## 2020-08-11 RX ADMIN — HEPARIN SODIUM 5000 UNITS: 5000 INJECTION INTRAVENOUS; SUBCUTANEOUS at 21:24

## 2020-08-11 RX ADMIN — CALCITONIN SALMON 200 UNITS: 200 INJECTION, SOLUTION INTRAMUSCULAR; SUBCUTANEOUS at 22:23

## 2020-08-11 ASSESSMENT — PAIN SCALES - GENERAL
PAINLEVEL_OUTOF10: 0

## 2020-08-11 NOTE — PROGRESS NOTES
Nephrology  Note                                                                                                                                                                                                                                                                                                                                                               Office : [phone]     Fax :[phone]              Patient's Name: Ashley Silverio  8/11/2020    Reason for Consult:  WANDER on CKD      History of Present Ilness:    Ashley Silverio is a 65 y.o. female with Multiple myeloma, CKD 3 not on dialysis, HTN, CVA, Porcelain gall bladder who presents with chief complaint of urinary retention. Patient reports that in the past 24 hours she has not urinated. Pt has multiple Myeloma on A acyclovir, PPI. Yesterday she noticed that her diaper is dry and she did not void all day. This is abnormal for her and has never happened in the past.  She denies starting any new medications. She does have urinary urgency. There is no associated abdominal pain. She denies any fever or chills. She does have good p.o. intake today as she has drinking approximately 36 ounces of fluids. Yesterday she voided approximately 4-5 times. She recently had a tunneled dialysis catheter placed which is now removed. During that time she was still voiding well. She denies any lower extremity swelling. [place] is placed in the ER and she has only made 100 cc of urine. Patient has history of myeloma kidney and was on dialysis. Her kidney function did get better with the chemo. She was off dialysis.     Patient has new onset WANDER. INTERVAL HISTORY    8/11: Pt scheduled for liver biopsy today. She is feeling better today, but does endorse pain at her IV site. She is making lots of urine, yellow in color. Her appetite is decreased, but she is drinking fluids and sleeping okay. Pt is anxious to get home.     8/10: Feels better  Shortness of breath: No   UOP: Good   Creat: trending down  Ca worsening      Past Medical History:   Diagnosis Date    Allergic rhinitis     Anemia     CAD (coronary artery disease)     CAD (coronary artery disease)     Diverticulosis     ESRD (end stage renal disease) on dialysis ([facility] [address] 2020    GERD (gastroesophageal reflux disease)     History of colonoscopy     Hyperlipidemia     Hypertension     Myocardial infarction, inferior wall [facility]) 2006       Past Surgical History:   Procedure Laterality Date    BREAST REDUCTION SURGERY  Bilat     SECTION  x3    CT BONE MARROW BIOPSY  2020    CT BONE MARROW BIOPSY 2020 [facility] CT SCAN    HYSTERECTOMY      TYMPANOPLASTY         Family History   Problem Relation Age of Onset    Heart Disease Mother     High Cholesterol Mother     Heart Disease Sister         hypertension    Diabetes Sister         hypertension    High Cholesterol Sister         hypertension    Heart Disease Brother     High Cholesterol Brother     Stroke Brother         hypertension        reports that she has been smoking. She has a 22.50 pack-year smoking history. She has never used smokeless tobacco. She reports that she does not drink alcohol or use drugs. Allergies:  Patient has no known allergies.     Current Medications:    0.9 % sodium chloride infusion, Continuous  potassium chloride (KLOR-CON M) extended release tablet 40 mEq, Once  amLODIPine (NORVASC) tablet 10 mg, Daily  heparin (porcine) injection 5,000 Units, Q8H  atorvastatin (LIPITOR) tablet 40 mg, Nightly  metoprolol tartrate (LOPRESSOR) tablet 25 mg, BID  sodium chloride flush 0.9 % injection 10 mL, 2 times per day  sodium chloride flush 0.9 % injection 10 mL, PRN  acetaminophen (TYLENOL) tablet 650 mg, Q6H PRN    Or  acetaminophen (TYLENOL) suppository 650 mg, Q6H PRN  promethazine (PHENERGAN) tablet 12.5 mg, Q6H PRN    Or  ondansetron (ZOFRAN) injection 4 mg, Q6H PRN  glucose (GLUTOSE) 40 % oral gel 15 g, PRN  dextrose 50 % IV solution, PRN  glucagon (rDNA) injection 1 mg, PRN  dextrose 5 % solution, PRN      Physical exam:     Vitals:  /60   Pulse 100   Temp 99.1 °F (37.3 °C) (Oral)   Resp 23   Ht 5' 5\" (1.651 m)   Wt 105 lb 13.1 oz (48 kg)   SpO2 100%   BMI 17.61 kg/m²     Constitutional:  awake, NAD  HEENT:  MMM  Neck: supple, no JVD  Cardiovascular:  RRR, S1, S2, no M/R/G  Respiratory: CTA bilaterally, equal breath sounds  Abdomen:  +BS, soft, ND, NT  Ext: no lower extremity edema  Skin: dry/intact  CNS: alert, no agitation       Data:   Labs:  CBC:   Recent Labs     08/09/20  0002 08/10/20  0440 08/11/20  0436   WBC 4.3 5.7 6.8   HGB 9.2* 9.7* 9.6*    204 190     BMP:    Recent Labs     08/09/20  0408 08/10/20  0440 08/11/20  0436    138 141   K 3.8 4.6 3.4*    103 105   CO2 19* 20* 22   BUN 12 13 16   CREATININE 1.4* 1.2 1.1   GLUCOSE 91 79 97     Ca/Mg/Phos:   Recent Labs     08/08/20  1138 08/09/20  0408 08/10/20  0440 08/11/20  0436   CALCIUM  --  11.0* 11.7* 12.4*   MG 1.80  --   --   --    PHOS  --  3.2 4.0 2.9     Hepatic: No results for input(s): AST, ALT, ALB, BILITOT, ALKPHOS in the last 72 hours. Troponin: No results for input(s): TROPONINI in the last 72 hours. BNP: No results for input(s): BNP in the last 72 hours. Lipids: No results for input(s): CHOL, TRIG, HDL, LDLCALC, LABVLDL in the last 72 hours. ABGs: No results for input(s): PHART, PO2ART, MHG2MDJ in the last 72 hours. INR: No results for input(s): INR in the last 72 hours. UA:  No results for input(s): Richardine Lav, GLUCOSEU, BILIRUBINUR, KETUA, SPECGRAV, BLOODU, PHUR, PROTEINU, UROBILINOGEN, NITRU, LEUKOCYTESUR, Fermin Helm in the last 72 hours. Urine Microscopic:   No results for input(s): LABCAST, BACTERIA, COMU, HYALCAST, WBCUA, RBCUA, EPIU in the last 72 hours. Urine Culture: No results for input(s): LABURIN in the last 72 hours.   Urine Chemistry:   No results for

## 2020-08-11 NOTE — PROGRESS NOTES
Internal Medicine Progress Note PGY-1    Admit Date: 8/5/2020    CC: Unable to make urine    Interval history:   No acute events overnight. Patient was doing well this morning and is anxious for her procedure today. No SOB, CP, N/V. Medications:     Scheduled Meds:   dexamethasone  20 mg Oral Daily    calcitonin  200 Units Subcutaneous BID    amLODIPine  10 mg Oral Daily    heparin (porcine)  5,000 Units Subcutaneous Q8H    atorvastatin  40 mg Oral Nightly    metoprolol tartrate  25 mg Oral BID    sodium chloride flush  10 mL Intravenous 2 times per day     Continuous Infusions:   sodium chloride 125 mL/hr at 08/11/20 0818    dextrose       PRN Meds:sodium chloride flush, acetaminophen **OR** acetaminophen, promethazine **OR** ondansetron, glucose, dextrose, glucagon (rDNA), dextrose    Objective:     Vitals:   T-max:  Patient Vitals for the past 8 hrs:   BP Temp Temp src Pulse Resp SpO2 Weight   08/11/20 1047 (!) 142/59 -- -- 78 19 94 % --   08/11/20 1040 (!) 142/55 -- -- 81 22 94 % --   08/11/20 1039 (!) 137/51 -- -- 83 20 91 % --   08/11/20 1034 (!) 146/56 -- -- 83 22 93 % --   08/11/20 1031 (!) 140/62 -- -- 86 22 97 % --   08/11/20 1011 (!) 157/67 98.1 °F (36.7 °C) Axillary 107 19 97 % --   08/11/20 0839 -- -- -- -- -- -- 106 lb 11.2 oz (48.4 kg)   08/11/20 0809 136/60 99.1 °F (37.3 °C) Oral 100 23 100 % --   08/11/20 0430 (!) 159/83 99.3 °F (37.4 °C) Oral 90 20 100 % --       Intake/Output Summary (Last 24 hours) at 8/11/2020 1122  Last data filed at 8/11/2020 0630  Gross per 24 hour   Intake 771 ml   Output 350 ml   Net 421 ml     Physical Exam   Constitutional: Patient is oriented to person, place, and time. Appears well-developed and well-nourished. No distress. HENT: Head, normocephalic and atraumatic. External ears normal.   Mouth/Throat: Oropharynx is clear and moist. No exudate or erythema  Eyes: Pupils are equal, round, and reactive to light.  Conjunctivae and EOM are normal. Cardiovascular: Regular rate and rythem with normal heart sounds and intact distal pulses. No murmurs, rubs, or gallops  Pulmonary/Chest: Effort normal and breath sounds normal. No respiratory distress. No wheezes, rhonchi, or rales   Abdominal: Soft, non tender, non distended with normal bowel sounds. No rebound or guarding. Musculoskeletal: Normal range of motion with no extremity edema    LABS:    CBC:   Recent Labs     08/09/20  0002 08/10/20  0440 08/11/20  0436   WBC 4.3 5.7 6.8   HGB 9.2* 9.7* 9.6*   HCT 28.0* 30.1* 29.5*    204 190   MCV 88.2 89.4 88.4     Renal:    Recent Labs     08/08/20  1138  08/09/20  0408 08/10/20  0440 08/11/20  0436   NA  --   --  136 138 141   K  --    < > 3.8 4.6 3.4*   CL  --   --  106 103 105   CO2  --   --  19* 20* 22   BUN  --   --  12 13 16   CREATININE  --   --  1.4* 1.2 1.1   GLUCOSE  --   --  91 79 97   CALCIUM  --   --  11.0* 11.7* 12.4*   MG 1.80  --   --   --   --    PHOS  --   --  3.2 4.0 2.9   ANIONGAP  --   --  11 15 14    < > = values in this interval not displayed. Hepatic:   Recent Labs     08/09/20  0408 08/10/20  0440 08/11/20  0436   LABALBU 3.2* 3.4 3.5     Troponin: No results for input(s): TROPONINI in the last 72 hours. BNP: No results for input(s): BNP in the last 72 hours. Lipids: No results for input(s): CHOL, HDL in the last 72 hours. Invalid input(s): LDLCALCU, TRIGLYCERIDE  ABGs:  No results for input(s): PHART, FSN0ZLR, PO2ART, MTH9ASX, BEART, THGBART, T2PQUKKE, OJS4EPD in the last 72 hours. INR: No results for input(s): INR in the last 72 hours. Lactate: No results for input(s): LACTATE in the last 72 hours. Cultures:  -----------------------------------------------------------------  RAD:   CT AP 8/6/2020  1. Development of hypodense lesion within liver likely relates to primary or metastatic neoplasm reduced since 2017 examination.   .         Porcelain gallbladder.  Porcelain gallbladder is associated with gallbladder neoplasm. Clinical correlation suggested.         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 Vascular consult may be useful if indicated.         Marked colonic diverticulosis without diverticulitis.         No hydronephrosis         No change in left adrenal nodule         Marked lytic lesions noted consistent with patient's myeloma involving the bilateral ribs. Lower thoracic spine, lumbar spine, pelvis, sacrum and hips and proximal femurs with lytic lesion involving the left iliac bone with pathologic fracture with gap    involving the left iliac bone present previously. Assessment/Plan:   65 y.o. female with PMHx of Multiple myeloma, ESRD not on dialysis, HTN, CVA, Porcelain gall bladder who presented to [facility]. for un able to make urine of 1 day duration. WANDER on CKD, resolving   Pt has hx of ESRD, s/p dialysis, p/w un uria,  Cr 2.9, K 5.7. K has improved after lasix and fluids. . She has produced ample urine since admission. Patient appears to have been fluid down causing lack of urination and increased Cr. CT AP showed no evidence of obstruction. Concern for myeloma nephropathy. Urea excretion 61%. Lambda FLC to 878 with K FLC to 9.47. Cr has improved to baseline.   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 Received pamidronate on 8/10.   -dexamethasone 20 mg daily for 4 days  -calcitonin 4 units/kg q12 hrs, 2 days    Multiple myloma  Hx of multiple myloma, no sign of infection, WANDER on ckd. WANDER likely due to myeloma and has increased lambda FLC to 827 with kappa to 9.47.  New liver lesion on CT AP concerning for myeloma vs cholangiocarcinoma with hx of porcelain gallbladder.   -we will hold Acyclovir in setting of WANDER on ESRD, hyperkalemia  -Consult oncology, liver biopsy on 8/11    Hypertension  - continue metoprolol  - amlodipine increased to 10mg qD    Hyperkalemia, resolved  K 5.7 from 2.7(10 days ago) in setting of Un uria and WANDER. Patient K improved after lasix and fluids.      Hyponatremia, resolved  Na 126 , from 139  Improved to 136 this morning. Urine osmo low at 268, urine K 7.7, and urine chloride 118. Urine studies show evidence of intrnisc renal disease with fractional excretion of urea of 61%.    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

## 2020-08-11 NOTE — PROGRESS NOTES
Oncology / Hematology Care      Patient name:   Xiomara August   Date:     8/11/2020           Interval History:    66 yo patient, well known to our practice    Cr improving to 1.1; calcium levels now increasing  Received pamidronate yesterday    Percutaneous liver biopsy today          Allergies:    No Known Allergies       Medications:      Current Facility-Administered Medications:     0.9 % sodium chloride infusion, , Intravenous, Continuous, Dangelo Slaughter MD, Last Rate: 125 mL/hr at 08/11/20 0818    amLODIPine (NORVASC) tablet 10 mg, 10 mg, Oral, Daily, Dangelo Slaughter MD, 10 mg at 08/11/20 0950    heparin (porcine) injection 5,000 Units, 5,000 Units, Subcutaneous, Q8H, Ratna Crow MD, Stopped at 08/11/20 0445    atorvastatin (LIPITOR) tablet 40 mg, 40 mg, Oral, Nightly, Elena Acevedo MD, 40 mg at 08/10/20 1930    metoprolol tartrate (LOPRESSOR) tablet 25 mg, 25 mg, Oral, BID, Chandrika Thompson MD, 25 mg at 08/11/20 0950    sodium chloride flush 0.9 % injection 10 mL, 10 mL, Intravenous, 2 times per day, Chandrika Thompson MD, 10 mL at 08/10/20 1930    sodium chloride flush 0.9 % injection 10 mL, 10 mL, Intravenous, PRN, Elena Pond MD    acetaminophen (TYLENOL) tablet 650 mg, 650 mg, Oral, Q6H PRN, 650 mg at 08/09/20 0348 **OR** acetaminophen (TYLENOL) suppository 650 mg, 650 mg, Rectal, Q6H PRN, Elena Pond MD    promethazine (PHENERGAN) tablet 12.5 mg, 12.5 mg, Oral, Q6H PRN **OR** ondansetron (ZOFRAN) injection 4 mg, 4 mg, Intravenous, Q6H PRN, Elena Pond MD    glucose (GLUTOSE) 40 % oral gel 15 g, 15 g, Oral, PRN, Elena Pond MD    dextrose 50 % IV solution, 12.5 g, Intravenous, PRN, Elena Pond MD    glucagon (rDNA) injection 1 mg, 1 mg, Intramuscular, PRN, Elena Pond MD    dextrose 5 % solution, 100 mL/hr, Intravenous, PRN, Elena Acevedo MD        Review of Systems:    · History obtained from patient, otherwise, further 10 point ROS is negative        Physical Exam:    BP (!) 157/67   Pulse 107   Temp 98.1 °F (36.7 °C) (Axillary)   Resp 19   Ht 5' 5\" (1.651 m)   Wt 106 lb 11.2 oz (48.4 kg)   SpO2 100%   BMI 17.76 kg/m²     General appearance: alert and cooperative; no acute distress  Head: NCAT, PERRLA, EOMI; oral and nasopharynx without lesions, dentition adequate repair  Neck: no JVD or thyromegaly  Lungs: clear to auscultation bilaterally; no audible rhonchi, rales, wheezes or crackles  Heart: regular rate and rhythm, S1, S2 normal; no murmurs, rubs or gallops  Abdomen: soft, non-tender and non-distended; normoactive, tympanic bowel sounds; no hepatosplenomegaly  Extremities: no cyanosis, clubbing, edema or asymmetry  Skin: no jaundice, purpura or petechiae  Lymph Nodes:  no auricular, cervical, supraclavicular, axillary, inguinal or popliteal lymphadenopathy  Neurologic:  CN 2-12 intact; no focal motor, sensory or cerebellar deficits        Laboratory Evaluation:      CBC:   Lab Results   Component Value Date    WBC 6.8 08/11/2020    NEUTROABS 5.1 08/11/2020    HGB 9.6 08/11/2020    MCV 88.4 08/11/2020     08/11/2020       BMP:   Lab Results   Component Value Date     08/11/2020    K 3.4 08/11/2020    K 3.8 08/09/2020    CO2 22 08/11/2020    BUN 16 08/11/2020    CREATININE 1.1 08/11/2020    MG 1.80 08/08/2020    TSH 0.93 03/30/2020       HEPATIC:  Lab Results   Component Value Date    AST 14 04/01/2020    ALT 6 04/01/2020    ALKPHOS 64 04/01/2020    PROT 6.0 08/06/2020    PROT 7.5 04/09/2012    BILITOT <0.2 04/01/2020    BILIDIR <0.2 03/30/2020     03/31/2020           Radiology Evaluation:    CT A/P:    Development of hypodense lesion within liver likely relates to primary or metastatic neoplasm reduced since 2017 examination.   .         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  Clinical correlation suggested.         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 Vascular consult may be useful if indicated.         Marked colonic diverticulosis without diverticulitis.         No hydronephrosis         No change in left adrenal nodule         Marked lytic lesions noted consistent with patient's myeloma involving the bilateral ribs. Lower thoracic spine, lumbar spine, pelvis, sacrum and hips and proximal femurs with lytic lesion involving the left iliac bone with pathologic fracture with gap    involving the left iliac bone present previously. Assessment / Plan:      Multiple Myeloma    - Relapsed/Refractory, CyBorD    Hypercalcemia due to MM  Acute on chronic kidney failure - Cr now 1.1      - patient planned for therapy to begin 8/17  - response to pamidronate may take 2-3 days    - in the interim:  - add dexamethasone, 20 mg daily for 4 days   - add calcitonin 4 units/kg q12 hrs, for 2 days    - limited options on formulary, will ask pharmacy to procure daratumumab   - would like to avoid carfilzomib, due to CV issues such as saccular aneurysm    - initially plan was for discharge after liver biopsy, but may be better to start next line of therapy while hospitalized      ADDENDUM ( 7:52 PM )    - liver biopsy successfully completed  - will attempt to verify pathology tomorrow    - I collected lenalidomide, dex and ACV from [facility] on the return drip from [place] this afternoon; given to nursing staff, available to start when ai available    - once pathology confirmed, will begin ai/estelle/dex  - Nuria Pena working to procure ai from [facility] [employer]    - if calcium better by Friday, can discharge at that time        As always, thank you for allowing me the opportunity to participate in the care of your patient. Please do not hesitate to contact me with questions or concerns regarding further management. Luca Wray DO, MS  Oncology/Hematology Care    Please contact via:  1. [employer]  2.   Cell Phone:  [phone]    8/11/2020   10:14 AM

## 2020-08-11 NOTE — FLOWSHEET NOTE
IMAGING SERVICES NURSING PROGRESS NOTE    Procedure:  Ct guided liver biopsy  August 11, 2020  Maria Fernanda Goodson      Allergies:  No Known Allergies    Vitals:    08/11/20 1047   BP: (!) 142/59   Pulse: 78   Resp: 19   Temp:    SpO2: 94%       Recent lab work reviewed with MD: yes   Procedure explained to patient by MD: yes   Informed consent obtained:    Mental Status:  Normal  Readiness to learn:  Yes  Barriers to learning: No    Pain Assessment Pre-Procedure:  Pain Present:  no  Pain Score:  0      Time out Procedure Verification with:  [x] RN  [x] Physician  [x] Patient  [x] Other: CT Technologist  Procedure site marked, if applicable:  Yes    Note: Patient arrived A & O x 4, denies pain, breathing easily on room air, Spoke to Dr. Karsten Sosa prior to procedure. Procedural sedation:  Fentanyl:50  mcg  Versed:   1 mg  Post Procedureal Note:  Patient tolerated procedure well. Liver Biopsy done, no bleeding at site, dsd applied. . Breathing easily on room air. Report given to  [facility]. Patient transported in stable conditon to room 3524. Pain Assessment Post-Procedure:  Pain Present:  no      Plan of Care Goals:  Safety measures met:  Yes  Patient understands explanation of procedure:  Yes    Time in: 1013   Time out: [address] [place].   N 8/11/2020

## 2020-08-11 NOTE — PROCEDURES
Brief Postoperative Note    Leatha Mcdowell  YOB: 1950  [id number]    Pre-operative Diagnosis: liver lesion     Post-operative Diagnosis: Same    Procedure: Successful CT-guided core needle biopsy of liver lesion (20Gx4).     Anesthesia: moderate    Surgeons/Assistants: Deysi Deal    Estimated Blood Loss: Minimal    Complications: none    Specimens: were obtained      Adam Bridges MD  8/11/2020

## 2020-08-11 NOTE — PLAN OF CARE
Problem: Falls - Risk of:  Goal: Will remain free from falls  Description: Will remain free from falls  Outcome: Ongoing  Note: Pt with weak but steady gait, up with standby assistance, no other issues at this time, instructed pt to call out for assistance as needed, pt verbalized understanding, bed in low position, bed alarm on, side rails up, call light in reach, will continue to monitor. Problem: Physical Regulation:  Goal: Will remain free from infection  Description: Will remain free from infection  Outcome: Ongoing  Note: Pt afebrile, no S/S of infection, PIV site free from S/S of infection, no drainage, edema, redness, swelling, or tenderness, flushes easily with brisk blood return, dressing changes continue per protocol and on an as needed basis - see flowsheet, will continue to monitor. Problem: Bleeding:  Goal: Will show no signs and symptoms of excessive bleeding  Description: Will show no signs and symptoms of excessive bleeding  Outcome: Ongoing  Note: Pt at risk for bleeding, no S/S of bleeding noted, PLTS were 190 with AM labs, no PLT transfusion parameters ordered, will continue to monitor. Problem: PROTECTIVE PRECAUTIONS  Goal: Patient will remain free of nosocomial Infections  Outcome: Ongoing  Note: Pt afebrile, no S/S of infection, will continue to monitor. Problem: Activity:  Goal: Ability to tolerate increased activity will improve  Description: Ability to tolerate increased activity will improve  Outcome: Ongoing  Note: Stable/No Isolation Precautions:  Pt with activity orders for up with assistance. Encouraged pt to be up OOB as much as possible throughout the day and for all meals. Encouraged frequent short naps as necessary to preserve energy but instructed that while awake, pt should be OOB. Encouraged pt to ambulate in halls. Pt not seen up in halls this shift. Pt is visualized to be OOB 0-25% of the time this shift. Will continue to encourage frequent activity. Problem: Skin Integrity:  Goal: Absence of new skin breakdown  Description: Absence of new skin breakdown  Note: Pt's skin remains intact, no evidence of breakdown noted, will continue to monitor. Problem: Venous Thromboembolism:  Goal: Will show no signs or symptoms of venous thromboembolism  Description: Will show no signs or symptoms of venous thromboembolism  Outcome: Ongoing  Note: Adherent with DVT Prevention: Pt is at risk for DVT d/t decreased mobility and cancer treatment. Pt educated on importance of activity. Pt has orders for heparin. Pt verbalizes understanding of need for prophylaxis while inpatient. Problem: Pain:  Goal: Pain level will decrease  Description: Pain level will decrease  Outcome: Ongoing  Note: Pt free from pain, will continue to monitor.

## 2020-08-11 NOTE — PLAN OF CARE
Problem: Falls - Risk of:  Goal: Will remain free from falls  Description: Will remain free from falls  2020 by Loni Hogan RN  Outcome: Ongoing   Patient remained free of falls during shift. Patient is a Ramirez Fall Risk: Medium (25-44); uses call light appropriately, ambulates with a standby assist.  Will continue to encourage ambulation and implement POC. Call light within reach and hourly rounding in place. Problem: Nutrition  Goal: Optimal nutrition therapy  Outcome: Ongoing  Pt has a low appetite, encouraged to eat small frequent meals. Problem: Pain:  Goal: Pain level will decrease  Description: Pain level will decrease  2020 by Loni Hogan RN  Outcome: Ongoing  No complaint of pain for this shift    Problem: Bleeding:  Goal: Will show no signs and symptoms of excessive bleeding  Description: Will show no signs and symptoms of excessive bleeding  2020 by Loni Hogan RN  Outcome: Ongoing  Patient's hemoglobin this AM:   Recent Labs     20  0436   HGB 9.6*     Patient's platelet count this AM:   Recent Labs     20  0436       Thrombocytopenia Precautions in place. Patient showing no signs or symptoms of active bleeding. Transfusion not indicated at this time. Patient verbalizes understanding of all instructions. Will continue to assess and implement POC. Call light within reach and hourly rounding in place. Problem: PROTECTIVE PRECAUTIONS  Goal: Patient will remain free of nosocomial Infections  2020 by Loni Hogan RN  Outcome: Ongoing  Patient is showing no signs or symptoms of nosocomial infections. Patient's temp during shift are as follows: Temp (8hrs), Av.6 °F (37 °C), Min:98.4 °F (36.9 °C), Max:98.7 °F (37.1 °C)  . Patient placed in private room and instructed on importance of handwashing and when to wear a mask when ambulating in hallway. Will continue to assess for s/s of infection and implement POC.  Call light within reach and hourly rounding in place. Problem: Discharge Planning:  Goal: Discharged to appropriate level of care  Description: Discharged to appropriate level of care  Outcome: Ongoing   Pt will start chemo tomorrow, she is aware of ayoub of plan and she does not have any questions at this time  Problem: Skin Integrity:  Goal: Will show no infection signs and symptoms  Description: Will show no infection signs and symptoms  8/11/2020 1821 by Steve Washington RN  Outcome: Ongoing   No new skin breakdown noted. Repositioned every two hours, and shala care per protocol for incontinence management.

## 2020-08-11 NOTE — PRE SEDATION
Sedation Pre-Procedure Note    Patient Name: Sherwin Valerio   YOB: 1950  Room/Bed: 3921/0364-91  Medical Record Number: [id number]  Date: 2020   Time: 6561     Indication:  Liver lesion    Consent: I have discussed with the patient and/or the patient representative the indication, alternatives, and the possible risks and/or complications of the planned procedure and the anesthesia methods. The patient and/or patient representative appear to understand and agree to proceed. Vital Signs:   Vitals:    20 1040   BP: (!) 142/55   Pulse: 81   Resp: 22   Temp:    SpO2: 94%       Past Medical History:   has a past medical history of Allergic rhinitis, Anemia, CAD (coronary artery disease), CAD (coronary artery disease), Diverticulosis, ESRD (end stage renal disease) on dialysis ([facility] [address] GERD (gastroesophageal reflux disease), History of colonoscopy, Hyperlipidemia, Hypertension, and Myocardial infarction, inferior wall ([facility] [address] Past Surgical History:   has a past surgical history that includes  section (x3); Hysterectomy; Tympanoplasty; Breast reduction surgery ([facility]); and CT BIOPSY BONE MARROW (2020). Medications:   Scheduled Meds:    dexamethasone  20 mg Oral Daily    calcitonin  200 Units Subcutaneous BID    amLODIPine  10 mg Oral Daily    heparin (porcine)  5,000 Units Subcutaneous Q8H    atorvastatin  40 mg Oral Nightly    metoprolol tartrate  25 mg Oral BID    sodium chloride flush  10 mL Intravenous 2 times per day     Continuous Infusions:    sodium chloride 125 mL/hr at 20 0818    dextrose       PRN Meds: sodium chloride flush, acetaminophen **OR** acetaminophen, promethazine **OR** ondansetron, glucose, dextrose, glucagon (rDNA), dextrose  Home Meds:   Prior to Admission medications    Medication Sig Start Date End Date Taking?  Authorizing Provider   amLODIPine (NORVASC) 5 MG tablet Take 1 tablet by mouth once daily 20  Yes Eloina Mckeon MD   potassium chloride (KLOR-CON M) 20 MEQ extended release tablet Take 1 tablet by mouth 2 times daily 7/28/20  Yes Elizabeth Joya MD   calcium carbonate (OSCAL) 500 MG TABS tablet Take 500 mg by mouth daily   Yes Historical Provider, MD   acyclovir (ZOVIRAX) 400 MG tablet 1 tablet 2 times daily 5/22/20  Yes Historical Provider, MD   metoprolol tartrate (LOPRESSOR) 25 MG tablet Take 1 tablet by mouth twice daily 5/18/20  Yes Debbie Rosales MD   atorvastatin (LIPITOR) 40 MG tablet Take 1 tablet by mouth nightly 5/4/20  Yes Debbie Rosales MD   acetaminophen (TYLENOL) 325 MG tablet Take 650 mg by mouth every 6 hours as needed for Pain   Yes Historical Provider, MD   ondansetron (ZOFRAN) 8 MG tablet TAKE 1 TABLET BY MOUTH EVERY 8 HOURS AS NEEDED FOR NAUSEA AND VOMITING 4/8/20   Historical Provider, MD   pantoprazole (PROTONIX) 40 MG tablet TAKE 1 TABLET BY MOUTH ONCE DAILY IN THE MORNING BEFORE BREAKFAST 5/18/20   Debbie Rosales MD   Cholecalciferol (VITAMIN D) 50 MCG (2000 UT) CAPS capsule Take by mouth    Historical Provider, MD   dexamethasone (DECADRON) 2 MG tablet Take 5 tablets by mouth See Admin Instructions Take 5 tablets (10 mg) once per day, on Wed 4/8 & Sat 4/11 (2 doses in total). 4/7/20   Glenny Turner MD     Coumadin Use Last 7 Days:  no  Antiplatelet drug therapy use last 7 days: no  Other anticoagulant use last 7 days: no  Additional Medication Information:  -      Pre-Sedation Documentation and Exam:   Vital signs have been reviewed (see flow sheet for vitals).     Mallampati Airway Assessment:  Mallampati Class II - (soft palate, fauces & uvula are visible)    Prior History of Anesthesia Complications:   none    ASA Classification:  Class 2 - A normal healthy patient with mild systemic disease    Sedation/ Anesthesia Plan:   intravenous sedation    Medications Planned:   midazolam (Versed) intravenously and fentanyl intravenously    Patient is an appropriate candidate for plan of sedation: yes    Electronically signed by Yvette Ferrera MD on 8/11/2020 at 0892

## 2020-08-11 NOTE — FLOWSHEET NOTE
08/10/20 1710   Encounter Summary   Services provided to: Patient   Referral/Consult From: Ankur   Continue Visiting   (08/10, alan )   Complexity of Encounter Moderate   Length of Encounter 15 minutes   Routine   Type Initial   Assessment Calm; Approachable; Hopeful   Intervention Active listening;Explored feelings, thoughts, concerns   Outcome Comfort;Expressed gratitude   Staff Cristal Mason [place]

## 2020-08-12 LAB
ALBUMIN SERPL-MCNC: 3.6 G/DL (ref 3.4–5)
ALP BLD-CCNC: 89 U/L (ref 40–129)
ALT SERPL-CCNC: 7 U/L (ref 10–40)
ANION GAP SERPL CALCULATED.3IONS-SCNC: 13 MMOL/L (ref 3–16)
AST SERPL-CCNC: 15 U/L (ref 15–37)
BASOPHILS ABSOLUTE: 0 K/UL (ref 0–0.2)
BASOPHILS RELATIVE PERCENT: 0.3 %
BILIRUB SERPL-MCNC: 0.3 MG/DL (ref 0–1)
BILIRUBIN DIRECT: <0.2 MG/DL (ref 0–0.3)
BILIRUBIN, INDIRECT: ABNORMAL MG/DL (ref 0–1)
BUN BLDV-MCNC: 19 MG/DL (ref 7–20)
CALCIUM SERPL-MCNC: 11.5 MG/DL (ref 8.3–10.6)
CHLORIDE BLD-SCNC: 107 MMOL/L (ref 99–110)
CO2: 20 MMOL/L (ref 21–32)
CREAT SERPL-MCNC: 1.1 MG/DL (ref 0.6–1.2)
EOSINOPHILS ABSOLUTE: 0 K/UL (ref 0–0.6)
EOSINOPHILS RELATIVE PERCENT: 0 %
GFR AFRICAN AMERICAN: 59
GFR NON-AFRICAN AMERICAN: 49
GLUCOSE BLD-MCNC: 176 MG/DL (ref 70–99)
HCT VFR BLD CALC: 28.7 % (ref 36–48)
HEMOGLOBIN: 9.3 G/DL (ref 12–16)
LYMPHOCYTES ABSOLUTE: 0.7 K/UL (ref 1–5.1)
LYMPHOCYTES RELATIVE PERCENT: 8.6 %
MCH RBC QN AUTO: 28.8 PG (ref 26–34)
MCHC RBC AUTO-ENTMCNC: 32.5 G/DL (ref 31–36)
MCV RBC AUTO: 88.7 FL (ref 80–100)
MONOCYTES ABSOLUTE: 0.2 K/UL (ref 0–1.3)
MONOCYTES RELATIVE PERCENT: 2.3 %
NEUTROPHILS ABSOLUTE: 7.1 K/UL (ref 1.7–7.7)
NEUTROPHILS RELATIVE PERCENT: 88.8 %
PDW BLD-RTO: 19.7 % (ref 12.4–15.4)
PHOSPHORUS: 2.4 MG/DL (ref 2.5–4.9)
PLATELET # BLD: 188 K/UL (ref 135–450)
PMV BLD AUTO: 8.2 FL (ref 5–10.5)
POTASSIUM SERPL-SCNC: 4.2 MMOL/L (ref 3.5–5.1)
RBC # BLD: 3.23 M/UL (ref 4–5.2)
SODIUM BLD-SCNC: 140 MMOL/L (ref 136–145)
TOTAL PROTEIN: 7.6 G/DL (ref 6.4–8.2)
WBC # BLD: 8 K/UL (ref 4–11)

## 2020-08-12 PROCEDURE — 6370000000 HC RX 637 (ALT 250 FOR IP): Performed by: STUDENT IN AN ORGANIZED HEALTH CARE EDUCATION/TRAINING PROGRAM

## 2020-08-12 PROCEDURE — 6370000000 HC RX 637 (ALT 250 FOR IP): Performed by: INTERNAL MEDICINE

## 2020-08-12 PROCEDURE — 6360000002 HC RX W HCPCS

## 2020-08-12 PROCEDURE — 2580000003 HC RX 258: Performed by: INTERNAL MEDICINE

## 2020-08-12 PROCEDURE — 2060000000 HC ICU INTERMEDIATE R&B

## 2020-08-12 PROCEDURE — 99233 SBSQ HOSP IP/OBS HIGH 50: CPT | Performed by: INTERNAL MEDICINE

## 2020-08-12 PROCEDURE — 80076 HEPATIC FUNCTION PANEL: CPT

## 2020-08-12 PROCEDURE — 6360000002 HC RX W HCPCS: Performed by: STUDENT IN AN ORGANIZED HEALTH CARE EDUCATION/TRAINING PROGRAM

## 2020-08-12 PROCEDURE — 85025 COMPLETE CBC W/AUTO DIFF WBC: CPT

## 2020-08-12 PROCEDURE — 97535 SELF CARE MNGMENT TRAINING: CPT

## 2020-08-12 PROCEDURE — 6360000002 HC RX W HCPCS: Performed by: INTERNAL MEDICINE

## 2020-08-12 PROCEDURE — 80069 RENAL FUNCTION PANEL: CPT

## 2020-08-12 PROCEDURE — 97116 GAIT TRAINING THERAPY: CPT

## 2020-08-12 PROCEDURE — 2580000003 HC RX 258: Performed by: STUDENT IN AN ORGANIZED HEALTH CARE EDUCATION/TRAINING PROGRAM

## 2020-08-12 PROCEDURE — 97165 OT EVAL LOW COMPLEX 30 MIN: CPT

## 2020-08-12 PROCEDURE — 97161 PT EVAL LOW COMPLEX 20 MIN: CPT

## 2020-08-12 RX ORDER — DIPHENHYDRAMINE HCL 25 MG
50 TABLET ORAL EVERY 24 HOURS
Status: DISCONTINUED | OUTPATIENT
Start: 2020-08-12 | End: 2020-08-13 | Stop reason: SDUPTHER

## 2020-08-12 RX ORDER — DEXAMETHASONE 4 MG/1
20 TABLET ORAL EVERY 24 HOURS
Status: DISCONTINUED | OUTPATIENT
Start: 2020-08-13 | End: 2020-08-13 | Stop reason: SDUPTHER

## 2020-08-12 RX ORDER — MONTELUKAST SODIUM 10 MG/1
10 TABLET ORAL EVERY 24 HOURS
Status: DISCONTINUED | OUTPATIENT
Start: 2020-08-12 | End: 2020-08-13 | Stop reason: SDUPTHER

## 2020-08-12 RX ORDER — METHYLPREDNISOLONE SODIUM SUCCINATE 125 MG/2ML
INJECTION, POWDER, LYOPHILIZED, FOR SOLUTION INTRAMUSCULAR; INTRAVENOUS
Status: COMPLETED
Start: 2020-08-12 | End: 2020-08-12

## 2020-08-12 RX ORDER — ACETAMINOPHEN 325 MG/1
650 TABLET ORAL EVERY 24 HOURS
Status: DISCONTINUED | OUTPATIENT
Start: 2020-08-12 | End: 2020-08-13 | Stop reason: SDUPTHER

## 2020-08-12 RX ORDER — METHYLPREDNISOLONE SODIUM SUCCINATE 125 MG/2ML
60 INJECTION, POWDER, LYOPHILIZED, FOR SOLUTION INTRAMUSCULAR; INTRAVENOUS ONCE
Status: COMPLETED | OUTPATIENT
Start: 2020-08-12 | End: 2020-08-12

## 2020-08-12 RX ORDER — ACYCLOVIR 400 MG/1
400 TABLET ORAL 2 TIMES DAILY
Status: DISCONTINUED | OUTPATIENT
Start: 2020-08-12 | End: 2020-08-14 | Stop reason: HOSPADM

## 2020-08-12 RX ORDER — LENALIDOMIDE 15 MG/1
15 CAPSULE ORAL EVERY OTHER DAY
Status: DISCONTINUED | OUTPATIENT
Start: 2020-08-12 | End: 2020-08-14 | Stop reason: HOSPADM

## 2020-08-12 RX ORDER — SODIUM CHLORIDE 9 MG/ML
INJECTION, SOLUTION INTRAVENOUS CONTINUOUS
Status: DISCONTINUED | OUTPATIENT
Start: 2020-08-12 | End: 2020-08-12 | Stop reason: HOSPADM

## 2020-08-12 RX ADMIN — HEPARIN SODIUM 5000 UNITS: 5000 INJECTION INTRAVENOUS; SUBCUTANEOUS at 20:34

## 2020-08-12 RX ADMIN — SODIUM CHLORIDE: 9 INJECTION, SOLUTION INTRAVENOUS at 12:20

## 2020-08-12 RX ADMIN — ONDANSETRON 4 MG: 2 INJECTION INTRAMUSCULAR; INTRAVENOUS at 17:37

## 2020-08-12 RX ADMIN — ACYCLOVIR 400 MG: 400 TABLET ORAL at 20:34

## 2020-08-12 RX ADMIN — METHYLPREDNISOLONE SODIUM SUCCINATE 60 MG: 125 INJECTION, POWDER, LYOPHILIZED, FOR SOLUTION INTRAMUSCULAR; INTRAVENOUS at 17:41

## 2020-08-12 RX ADMIN — HEPARIN SODIUM 5000 UNITS: 5000 INJECTION INTRAVENOUS; SUBCUTANEOUS at 12:23

## 2020-08-12 RX ADMIN — DARATUMUMAB 400 MG: 100 INJECTION, SOLUTION, CONCENTRATE INTRAVENOUS at 15:20

## 2020-08-12 RX ADMIN — LENALIDOMIDE 15 MG: 15 CAPSULE ORAL at 15:21

## 2020-08-12 RX ADMIN — METOPROLOL TARTRATE 25 MG: 25 TABLET, FILM COATED ORAL at 08:43

## 2020-08-12 RX ADMIN — ACYCLOVIR 400 MG: 400 TABLET ORAL at 10:14

## 2020-08-12 RX ADMIN — ATORVASTATIN CALCIUM 40 MG: 40 TABLET, FILM COATED ORAL at 20:34

## 2020-08-12 RX ADMIN — DIPHENHYDRAMINE HYDROCHLORIDE 50 MG: 25 TABLET ORAL at 13:59

## 2020-08-12 RX ADMIN — DEXAMETHASONE 20 MG: 4 TABLET ORAL at 08:43

## 2020-08-12 RX ADMIN — Medication 10 ML: at 08:46

## 2020-08-12 RX ADMIN — Medication 10 ML: at 20:34

## 2020-08-12 RX ADMIN — MONTELUKAST 10 MG: 10 TABLET, FILM COATED ORAL at 13:59

## 2020-08-12 RX ADMIN — ACETAMINOPHEN 650 MG: 325 TABLET ORAL at 13:59

## 2020-08-12 RX ADMIN — METOPROLOL TARTRATE 25 MG: 25 TABLET, FILM COATED ORAL at 20:34

## 2020-08-12 RX ADMIN — METHYLPREDNISOLONE SODIUM SUCCINATE 60 MG: 125 INJECTION, POWDER, FOR SOLUTION INTRAMUSCULAR; INTRAVENOUS at 17:41

## 2020-08-12 RX ADMIN — AMLODIPINE BESYLATE 10 MG: 10 TABLET ORAL at 08:43

## 2020-08-12 ASSESSMENT — PAIN SCALES - GENERAL
PAINLEVEL_OUTOF10: 0

## 2020-08-12 NOTE — PROGRESS NOTES
Nephrology  Note                                                                                                                                                                                                                                                                                                                                                               Office : [phone]     Fax :[phone]              Patient's Name: Cecelia Sorenson  8/12/2020    Reason for Consult:  WANDER on CKD      History of Present Ilness:    Cecelia Sorenson is a 65 y.o. female with Multiple myeloma, CKD 3 not on dialysis, HTN, CVA, Porcelain gall bladder who presents with chief complaint of urinary retention. Patient reports that in the past 24 hours she has not urinated. Pt has multiple Myeloma on A acyclovir, PPI. Yesterday she noticed that her diaper is dry and she did not void all day. This is abnormal for her and has never happened in the past.  She denies starting any new medications. She does have urinary urgency. There is no associated abdominal pain. She denies any fever or chills. She does have good p.o. intake today as she has drinking approximately 36 ounces of fluids. Yesterday she voided approximately 4-5 times. She recently had a tunneled dialysis catheter placed which is now removed. During that time she was still voiding well. She denies any lower extremity swelling. De Leon is placed in the ER and she has only made 100 cc of urine. Patient has history of myeloma kidney and was on dialysis. Her kidney function did get better with the chemo. She was off dialysis.     Patient has new onset WANDER. INTERVAL HISTORY    8/12: Pt s/p liver biopsy. Reports no pain or discomfort. She is extremely anxious to get home. She is making a little bit of urine, yellow in color. She has had decreased appetite as she does not like the food here, but she is drinking fluids.      8/11: Pt scheduled for liver biopsy today. She is feeling better today, but does endorse pain at her IV site. She is making lots of urine, yellow in color. Her appetite is decreased, but she is drinking fluids and sleeping okay. Pt is anxious to get home. 810: Feels better  Shortness of breath: No   UOP: Good   Creat: trending down  Ca worsening      Past Medical History:   Diagnosis Date    Allergic rhinitis     Anemia     CAD (coronary artery disease)     CAD (coronary artery disease)     Diverticulosis     ESRD (end stage renal disease) on dialysis ([facility] [address] 2020    GERD (gastroesophageal reflux disease)     History of colonoscopy     Hyperlipidemia     Hypertension     Myocardial infarction, inferior wall [facility]) 2006       Past Surgical History:   Procedure Laterality Date    BREAST REDUCTION SURGERY  [facility]at     SECTION  x3    CT BONE MARROW BIOPSY  2020    CT BONE MARROW BIOPSY 2020 [facility] CT SCAN    CT NEEDLE BIOPSY LIVER PERCUTANEOUS  2020    CT NEEDLE BIOPSY LIVER PERCUTANEOUS 2020 [facility] CT SCAN    HYSTERECTOMY      TYMPANOPLASTY         Family History   Problem Relation Age of Onset    Heart Disease Mother     High Cholesterol Mother     Heart Disease Sister         hypertension    Diabetes Sister         hypertension    High Cholesterol Sister         hypertension    Heart Disease Brother     High Cholesterol Brother     Stroke Brother         hypertension        reports that she has been smoking. She has a 22.50 pack-year smoking history. She has never used smokeless tobacco. She reports that she does not drink alcohol or use drugs. Allergies:  Patient has no known allergies.     Current Medications:    dexamethasone (DECADRON) tablet 20 mg, Daily  amLODIPine (NORVASC) tablet 10 mg, Daily  heparin (porcine) injection 5,000 Units, Q8H  atorvastatin (LIPITOR) tablet 40 mg, Nightly  metoprolol tartrate (LOPRESSOR) tablet 25 mg, BID  sodium chloride flush 0.9 % injection 10 mL, 2 times per day  sodium chloride flush 0.9 % injection 10 mL, PRN  acetaminophen (TYLENOL) tablet 650 mg, Q6H PRN    Or  acetaminophen (TYLENOL) suppository 650 mg, Q6H PRN  promethazine (PHENERGAN) tablet 12.5 mg, Q6H PRN    Or  ondansetron (ZOFRAN) injection 4 mg, Q6H PRN  glucose (GLUTOSE) 40 % oral gel 15 g, PRN  dextrose 50 % IV solution, PRN  glucagon (rDNA) injection 1 mg, PRN  dextrose 5 % solution, PRN      Physical exam:     Vitals:  BP (!) 158/68   Pulse 81   Temp 97.8 °F (36.6 °C) (Oral)   Resp 19   Ht 5' 5\" (1.651 m)   Wt 109 lb 2 oz (49.5 kg)   SpO2 98%   BMI 18.16 kg/m²     Constitutional:  awake, NAD  HEENT:  MMM  Neck: supple, no JVD  Cardiovascular:  RRR, S1, S2, no M/R/G  Respiratory: CTA bilaterally, equal breath sounds  Abdomen:  +BS, soft, ND, NT  Ext: No lower extremity edema  Skin: dry/intact  CNS: alert, no agitation       Data:   Labs:  CBC:   Recent Labs     08/10/20  0440 08/11/20  0436 08/12/20  0457   WBC 5.7 6.8 8.0   HGB 9.7* 9.6* 9.3*    190 188     BMP:    Recent Labs     08/10/20  0440 08/11/20  0436 08/12/20  0457    141 140   K 4.6 3.4* 4.2    105 107   CO2 20* 22 20*   BUN 13 16 19   CREATININE 1.2 1.1 1.1   GLUCOSE 79 97 176*     Ca/Mg/Phos:   Recent Labs     08/10/20  0440 08/11/20  0436 08/12/20  0457   CALCIUM 11.7* 12.4* 11.5*   PHOS 4.0 2.9 2.4*     Hepatic:   Recent Labs     08/12/20 0457   AST 15   ALT 7*   BILITOT 0.3   ALKPHOS 89     Troponin: No results for input(s): TROPONINI in the last 72 hours. BNP: No results for input(s): BNP in the last 72 hours. Lipids: No results for input(s): CHOL, TRIG, HDL, LDLCALC, LABVLDL in the last 72 hours. ABGs: No results for input(s): PHART, PO2ART, PAH7CKA in the last 72 hours. INR: No results for input(s): INR in the last 72 hours.   UA:  No results for input(s): Estephanie Zhu, GLUCOSEU, Melven Martha, BLOODU, [address], PROTEINU, [address] in the last 72 hours. Urine Microscopic:   No results for input(s): LABCAST, BACTERIA, COMU, HYALCAST, WBCUA, RBCUA, EPIU in the last 72 hours. Urine Culture: No results for input(s): LABURIN in the last 72 hours. Urine Chemistry:   No results for input(s): Alfornia Mallory, PROTEINUR, NAUR in the last 72 hours. IMAGING:      Assessment/Plan   Celine Sánchez is a 65 y.o. female with Multiple myeloma, CKD 3 not on dialysis, HTN, CVA, Porcelain gall bladder who presented with chief complaint of urinary retention.      WANDER on CKD 3  - suspect due to possible worsening of multiple myeloma - hx of myeloma kidney  - Cr increased to 3.0 from 1.1 in 7/2020 (Peak 12.0), Cr better with IVF, stable @ 1.1  - no anion gap  - continue to monitor I/O  - prot/Cr ratio 1.48  - Very High risk for WANDER as her light chains are tubulotoxic   - She needed RRT sec to Myeloma kidney which recovered   -  IVF discontinued     Hypercalcemia - sec to MM  - improving, 12.4 --> 11.5, continue to monitor  - pamidronate x 1 - 8/10  - calcitonin started 8/11  - dexamethasone started 8/11    Multiple myeloma   - relapsed/refractory, CyBorD  - heme/onc following  - plan to start daratumumab/lenalidomide/dexamethasone 8/12/20  - likely can be discharged tm after day #2 ai    Hyponatremia - resolved  - monitor    Hypokalemia - resolved  - monitor    HTN  - BP high today, monitor  - continue amlodipine, lopressor     Normocytic anemia  - Hgb stable at pt's baseline  - hx multiple myeloma  - monitor H/H  - hematology/oncology team following    Incidental liver lesion  - seen on CT abd pelvis 8/6/20  - Oncology consulted  - Liver biopsy 8/11/20    New Anuerysm on CTAP  - 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

## 2020-08-12 NOTE — PROGRESS NOTES
Physician Progress Note      Ang Malcolm  CSN #:                  [id number]  :                       1950  ADMIT DATE:       2020 6:42 PM  [address] [place] DATE:  RESPONDING  PROVIDER #:        Kary Mccurdy MD          QUERY TEXT:    Patient admitted with WANDER d/t Myeloma nephropathy. Weight loss noted per   Dietary who suspectes moderate to severe Malnutrition. If possible, please   document in progress notes and discharge summary if you are evaluating and /or   treating any of the following: The medical record reflects the following:  Risk Factors: MM  Clinical Indicators: Per RD c/s: \"At risk for malnutrition (Comment) (suspect   mod/severe malnutrition), -16%, 21 lb x 3 months, -3% x 2 wks, Intake < 50%\",   RD unable to physically assess pt; Per RD note 8/10 \" (intake) 1-75%, thus not   meeting pt nutrition needs\"  Treatment: ONS BID  Options provided:  -- Protein calorie malnutrition severe  -- Protein calorie malnutrition moderate  -- Underweight with BMI 18.16  -- Other - I will add my own diagnosis  -- Disagree - Not applicable / Not valid  -- Disagree - Clinically unable to determine / Unknown  -- Refer to Clinical Documentation Reviewer    PROVIDER RESPONSE TEXT:    This patient has moderate protein calorie malnutrition. Query created by:  Chito Tellez on 2020 1:20 PM      Electronically signed by:  Kary Mccurdy MD 2020 3:08 PM

## 2020-08-12 NOTE — PROGRESS NOTES
Internal Medicine Progress Note PGY-1    Admit Date: 8/5/2020    CC: Unable to make urine    Interval history:   No acute events overnight. Patient was sad and tearful this morning since she wants to be able to go home. No SOB, CP, N/V. Medications:     Scheduled Meds:   acyclovir  400 mg Oral BID    dexamethasone  20 mg Oral Daily    amLODIPine  10 mg Oral Daily    heparin (porcine)  5,000 Units Subcutaneous Q8H    atorvastatin  40 mg Oral Nightly    metoprolol tartrate  25 mg Oral BID    sodium chloride flush  10 mL Intravenous 2 times per day     Continuous Infusions:   sodium chloride      dextrose       PRN Meds:sodium chloride flush, acetaminophen **OR** acetaminophen, promethazine **OR** ondansetron, glucose, dextrose, glucagon (rDNA), dextrose    Objective:     Vitals:   T-max:  Patient Vitals for the past 8 hrs:   BP Temp Temp src Pulse Resp SpO2 Weight   08/12/20 0843 (!) 154/73 97.6 °F (36.4 °C) Oral 70 18 96 % --   08/12/20 0714 -- -- -- -- -- -- 109 lb 2 oz (49.5 kg)   08/12/20 0321 (!) 158/68 -- -- -- -- -- --   08/12/20 0315 (!) 164/88 97.8 °F (36.6 °C) Oral 81 19 98 % --       Intake/Output Summary (Last 24 hours) at 8/12/2020 1035  Last data filed at 8/12/2020 0846  Gross per 24 hour   Intake 190 ml   Output 325 ml   Net -135 ml     Physical Exam   Constitutional: Patient is oriented to person, place, and time. Appears well-developed and well-nourished. Appears sad and tearful  HENT: Head, normocephalic and atraumatic. External ears normal.   Mouth/Throat: Oropharynx is clear and moist. No exudate or erythema  Eyes: Pupils are equal, round, and reactive to light. Conjunctivae and EOM are normal.   Cardiovascular: Regular rate and rythem with normal heart sounds and intact distal pulses. No murmurs, rubs, or gallops  Pulmonary/Chest: Effort normal and breath sounds normal. No respiratory distress.  No wheezes, rhonchi, or rales   Abdominal: Soft, non tender, non distended with normal bowel sounds. No rebound or guarding. Musculoskeletal: Normal range of motion with no extremity edema    LABS:    CBC:   Recent Labs     08/10/20  0440 08/11/20  0436 08/12/20  0457   WBC 5.7 6.8 8.0   HGB 9.7* 9.6* 9.3*   HCT 30.1* 29.5* 28.7*    190 188   MCV 89.4 88.4 88.7     Renal:    Recent Labs     08/10/20  0440 08/11/20  0436 08/12/20  0457    141 140   K 4.6 3.4* 4.2    105 107   CO2 20* 22 20*   BUN 13 16 19   CREATININE 1.2 1.1 1.1   GLUCOSE 79 97 176*   CALCIUM 11.7* 12.4* 11.5*   PHOS 4.0 2.9 2.4*   ANIONGAP 15 14 13     Hepatic:   Recent Labs     08/10/20  0440 08/11/20  0436 08/12/20  0457   AST  --   --  15   ALT  --   --  7*   BILITOT  --   --  0.3   BILIDIR  --   --  <0.2   PROT  --   --  7.6   LABALBU 3.4 3.5 3.6   ALKPHOS  --   --  89     Troponin: No results for input(s): TROPONINI in the last 72 hours. BNP: No results for input(s): BNP in the last 72 hours. Lipids: No results for input(s): CHOL, HDL in the last 72 hours. Invalid input(s): LDLCALCU, TRIGLYCERIDE  ABGs:  No results for input(s): PHART, PSY5FZR, PO2ART, ZQC5FOA, BEART, THGBART, M7QKOBHL, AZC5HQX in the last 72 hours. INR: No results for input(s): INR in the last 72 hours. Lactate: No results for input(s): LACTATE in the last 72 hours. Cultures:  -----------------------------------------------------------------  RAD:   CT AP 8/6/2020  1. Development of hypodense lesion within liver likely relates to primary or metastatic neoplasm reduced since 2017 examination.   .         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 Clinical correlation suggested.         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  Vascular consult may be useful if indicated.         Marked colonic diverticulosis without diverticulitis.         No hydronephrosis         No change in left adrenal nodule         Marked lytic lesions noted consistent with patient's myeloma involving the bilateral ribs. Lower thoracic spine, lumbar spine, pelvis, sacrum and hips and proximal femurs with lytic lesion involving the left iliac bone with pathologic fracture with gap    involving the left iliac bone present previously. Assessment/Plan:   65 y.o. female with PMHx of Multiple myeloma, ESRD not on dialysis, HTN, CVA, Porcelain gall bladder [facility] for un able to make urine of 1 day duration. WANDER on CKD, resolving   Pt has hx of ESRD, s/p dialysis, p/w un uria,  Cr 2.9, K 5.7. K has improved after lasix and fluids. . She has produced ample urine since admission. Patient appears to have been fluid down causing lack of urination and increased Cr. CT AP showed no evidence of obstruction. Concern for myeloma nephropathy. Urea excretion 61%. Lambda FLC to 878 with K FLC to 9.47. Cr has improved to baseline.   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 Received pamidronate on 8/10. Calcium down to 11.5 today.   -dexamethasone 20 mg daily for 4 days  -calcitonin 4 units/kg q12 hrs, 2 days, last day today  -continue IVF at 125 ml/hr    Multiple myloma  Hx of multiple myloma, no sign of infection, WANDER on ckd. WANDER likely due to myeloma and has increased lambda FLC to 827 with kappa to 9.47. New liver lesion on CT AP concerning for myeloma vs cholangiocarcinoma with hx of porcelain gallbladder.   -we will hold Acyclovir in setting of WANDER on ESRD, hyperkalemia  -Consult oncology, liver biopsy on 8/11  -Oncology plans to start treatment on 8/13.      Hypertension  - continue metoprolol  - amlodipine increased to 10mg qD    Hyperkalemia, resolved  K 5.7 from 2.7(10 days ago) in setting of Un uria and WANDER. Patient K improved after lasix and fluids.      Hyponatremia, resolved  Na 126 , from 139  Improved to 136 this morning. Urine osmo low at 268, urine K 7.7, and urine chloride 118. Urine studies show evidence of intrnisc renal disease with fractional excretion of urea of 61%.    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

## 2020-08-12 NOTE — PLAN OF CARE
Problem: Falls - Risk of:  Goal: Will remain free from falls  Description: Will remain free from falls  8/12/2020 1848 by Sadie Snow RN  Outcome: Ongoing  Note:   Pt is a High fall risk. See Haleigh Marii Fall Score and ABCDS Injury Risk assessments.   + Screening for Orthostasis and/or + High Fall Risk per VILLAGRAN/ABCDS: Explained fall risk precautions to pt and family and rationale behind their use to keep the patient safe. Pt bed is in low position, side rails up, call light and belongings are in reach. Fall wristband applied and present on pts wrist.  Bed alarm on. Pt encouraged to call for assistance. Will continue with hourly rounds for PO intake, pain needs, toileting and repositioning as needed. Problem: Nutrition  Goal: Optimal nutrition therapy  8/12/2020 1848 by Sadie Snow RN  Outcome: Ongoing     Problem: Pain:  Goal: Pain level will decrease  Description: Pain level will decrease  8/12/2020 1848 by Sadie Snow RN  Outcome: Ongoing  Note: No complaints of pain this shift. Problem: Pain:  Goal: Control of acute pain  Description: Control of acute pain  8/12/2020 1848 by Sadie Snow RN  Outcome: Ongoing     Problem: Pain:  Goal: Control of chronic pain  Description: Control of chronic pain  8/12/2020 1848 by Sadie Snow RN  Outcome: Ongoing     Problem: Bleeding:  Goal: Will show no signs and symptoms of excessive bleeding  Description: Will show no signs and symptoms of excessive bleeding  8/12/2020 1848 by Sadie Snow RN  Outcome: Ongoing  Note: Patient's hemoglobin this AM:   Recent Labs     08/12/20  0457   HGB 9.3*     Patient's platelet count this AM:   Recent Labs     08/12/20  0457       Thrombocytopenia Precautions in place. Patient showing no signs or symptoms of active bleeding. Transfusion not indicated at this time. Patient verbalizes understanding of all instructions. Will continue to assess and implement POC.  Call light within reach and hourly rounding in place. Problem: Skin Integrity:  Goal: Will show no infection signs and symptoms  Description: Will show no infection signs and symptoms  8/12/2020 1848 by Georgette Schilling RN  Outcome: Ongoing  Note: No evidence of breakdown noted. Pt turns frequently in bed throughout shift. Encouraging increase in take this shift.

## 2020-08-12 NOTE — PROGRESS NOTES
in place           Patient Diagnosis(es): The primary encounter diagnosis was Acute renal failure, unspecified acute renal failure type ([facility] [address] A diagnosis of Pseudoaneurysm ([address] was also pertinent to this visit. has a past medical history of Allergic rhinitis, Anemia, CAD (coronary artery disease), CAD (coronary artery disease), Diverticulosis, ESRD (end stage renal disease) on dialysis ([address] GERD (gastroesophageal reflux disease), History of colonoscopy, Hyperlipidemia, Hypertension, and Myocardial infarction, inferior wall ([address] has a past surgical history that includes  section (x3); Hysterectomy; Tympanoplasty; Breast reduction surgery ([facility]); CT BIOPSY BONE MARROW (2020); and CT NEEDLE BIOPSY LIVER PERCUTANEOUS (2020). Treatment Diagnosis: impaired ADLs and transfers      Restrictions  Position Activity Restriction  Other position/activity restrictions: up with assist    Subjective   General  Chart Reviewed: Yes  Patient assessed for rehabilitation services?: Yes  Additional Pertinent Hx: PNH:  HTN, CAD, MI, GERD, ESRD-on dialysis, diverticulosis, tympanoplasty, multiple myeloma  Family / Caregiver Present: Yes(son)  Referring Practitioner: Dr. Andrés Coelho  Diagnosis: Pt admitted with urinary retention, dx of WANDER on CKD, hyperkalemia, hyponatremia  Subjective  Subjective: Pt sitting on side of the bed, Rn present. Pt agreeable to work with OT.   Patient Currently in Pain: Denies  Vital Signs  Patient Currently in Pain: Denies  Social/Functional History  Social/Functional History  Lives With: Spouse  Type of Home: House  Home Layout: Multi-level, Able to Live on Main level with bedroom/bathroom  Home Access: Stairs to enter with rails  Entrance Stairs - Number of Steps: 5 with unilateral handrail  Bathroom Shower/Tub: Tub/Shower unit  Bathroom Toilet: Handicap height  Bathroom Equipment: Shower chair, Grab bars in shower, Hand-held shower, Grab bars around toilet  Home Equipment: JMEA, Rolling walker  ADL Assistance: Independent  Homemaking Assistance: Needs assistance(spouse performs most, patient does light meal prep)  Ambulation Assistance: Independent  Transfer Assistance: Independent  Active : No  Additional Comments: patient denies history of falls at home       Objective              Functional Mobility  Functional Mobility Comments: funtional mobilty in room, pt declined assistive device, Paulo with hand held assist, pt unsteady  Toilet Transfers  Toilet - Technique: Ambulating(Paulo-hand held assist to/from bathroom-pt declines A device)  Equipment Used: (simulated 3 in 1 commode)  Toilet Transfer: Contact guard assistance  ADL  Feeding: Independent  Grooming: Independent(washing hands at sink with SBA in stance)  UE Dressing: Stand by assistance([facility] pants)           Transfers  Sit to stand: Contact guard assistance  Stand to sit: Contact guard assistance     Cognition  Overall Cognitive Status: WNL                 LUE AROM (degrees)  LUE General AROM: ~135 shoulder flex, elbow to hand=WFL  RUE AROM (degrees)  RUE General AROM: ~125 shoulder flex, elbow to hand=WFL        Hand Dominance  Hand Dominance: Right     Patient participated in OT eval and tx including ADLs and transfer        Plan   Plan  Times per week: 2-5  Times per day: Daily  Current Treatment Recommendations: Balance Training, Functional Mobility Training, Endurance Training, Self-Care / ADL    G-Code     OutComes Score                                                  -PAC Score        -[facility] Inpatient Daily Activity Raw Score: 21 (08/12/20 1211)  AM-PAC Inpatient ADL T-Scale Score : 44.27 (08/12/20 1211)  ADL Inpatient CMS 0-100% Score: 32.79 (08/12/20 1211)  ADL Inpatient CMS G-Code Modifier : Justo Pizano (08/12/20 1211)    Goals  Short term goals  Time Frame for Short term goals: discharge  Short term goal 1: pt to transfer to commode with S  Short term goal 2: pt to stand for 4 minutes with SBA, while doing

## 2020-08-12 NOTE — PROGRESS NOTES
Physical Therapy    Facility/Department: [address]  Initial Assessment and treatment    NAME: Sergei Landry  : 1950  MRN: [id number]    Date of Service: 2020    Discharge Recommendations:    Sergei Landry scored a 18/24 on the AM-PAC short mobility form. Current research shows that an AM-PAC score of 18 or greater is typically associated with a discharge to the patient's home setting. Based on the patient's AM-PAC score and their current functional mobility deficits, it is recommended that the patient have 2-3 sessions per week of Physical Therapy at d/c to increase the patient's independence. At this time, this patient demonstrates the endurance and safety to discharge home with home PT and a follow up treatment frequency of 2-3x/wk. Please see assessment section for further patient specific details. HOME HEALTH CARE: LEVEL 1 STANDARD    - Initial home health evaluation to occur within 24-48 hours, in patient home   - Therapy to evaluate with goal of regaining prior level of functioning   - Therapy to evaluate if patient has [address] needs for personal care      PT Equipment Recommendations  Equipment Needed: No  Other: patient has FWW and cane    Assessment   Body structures, Functions, Activity limitations: Decreased functional mobility ; Decreased safe awareness;Decreased balance;Decreased endurance  Assessment: Patient tolerated session well, transferring and ambulating in room and hallways as above with mildly unsteady gait using HHA. Patient declined use of an assistive device despite reaching out for objects for stability demonstrating overall decreased safety awareness and poor insight into deficits. Patient required seated rest break during ambulation due to fatigue. Patient is planning to d/c home. Son present in room reports family is able to provide 24 hour supervision/assistance.   Recommended use of FWW for mobility upon discharge; son verbalized understanding. Patient would benefit from continued skilled PT. Will follow while in acute care setting to maximize independence with mobility. Treatment Diagnosis: impaired mobility associated with WANDER  Prognosis: Good  Decision Making: Low Complexity  Patient Education: Educated on role of PT, safety with mobility, use of call light, d/c recommendations; patient verbalized understanding. REQUIRES PT FOLLOW UP: Yes  Activity Tolerance  Activity Tolerance: Patient Tolerated treatment well       Patient Diagnosis(es): The primary encounter diagnosis was Acute renal failure, unspecified acute renal failure type ([address] A diagnosis of Pseudoaneurysm ([address] was also pertinent to this visit. has a past medical history of Allergic rhinitis, Anemia, CAD (coronary artery disease), CAD (coronary artery disease), Diverticulosis, ESRD (end stage renal disease) on dialysis ([address] GERD (gastroesophageal reflux disease), History of colonoscopy, Hyperlipidemia, Hypertension, and Myocardial infarction, inferior wall ([address] has a past surgical history that includes  section (x3); Hysterectomy; Tympanoplasty; Breast reduction surgery ([place]); CT BIOPSY BONE MARROW (2020); and CT NEEDLE BIOPSY LIVER PERCUTANEOUS (2020). Restrictions  Position Activity Restriction  Other position/activity restrictions: up with assist  Vision/Hearing  Vision: Impaired  Vision Exceptions: Wears glasses at all times  Hearing: Within functional limits     Subjective  General  Chart Reviewed: Yes  Patient assessed for rehabilitation services?: Yes  Additional Pertinent Hx: Patient is a 66 y/o female admitted  with urinary retention. CT abdomen (+) for new liver lesion; biopsy on 2020. PMH significant for HTN, HLD, CAD, MI, ESRD, multiple myeloma.   Response To Previous Treatment: Not applicable  Family / Caregiver Present: Yes(son)  Referring Practitioner: Griselda Garcias MD  Referral Date : 20  Diagnosis: WANDER  Follows Commands: Within Functional Limits  General Comment  Comments: Patient sitting EOB upon arrival.  Subjective  Subjective: Patient agreeable to PT evaluation.   Pain Screening  Patient Currently in Pain: Denies  Vital Signs  Patient Currently in Pain: Denies       Orientation  Orientation  Overall Orientation Status: Within Functional Limits  Social/Functional History  Social/Functional History  Lives With: Spouse  Type of Home: House  Home Layout: Multi-level, Able to Live on Main level with bedroom/bathroom  Home Access: Stairs to enter with rails  Entrance Stairs - Number of Steps: 5 with unilateral handrail  Bathroom Shower/Tub: Tub/Shower unit  Bathroom Toilet: Handicap height  Bathroom Equipment: Shower chair, Grab bars in shower, Hand-held shower, Grab bars around toilet  Home Equipment: Cane, Rolling walker  ADL Assistance: [address] [place] [address] Avenue: Needs assistance(spouse performs most, patient does light meal prep)  Ambulation Assistance: Independent  Transfer Assistance: Independent  Active : No  Additional Comments: patient denies history of falls at home  Cognition   Cognition  Overall Cognitive Status: WFL  Cognition Comment: decreased insight into deficits    Objective          AROM RLE (degrees)  RLE AROM: WFL  AROM LLE (degrees)  LLE AROM : WFL  Strength RLE  Strength RLE: WFL  Strength LLE  Strength LLE: WFL        Bed mobility  Comment: patient sitting EOB upon arrival and in bedside chair at end of session  Transfers  Sit to Stand: Contact guard assistance(from EOB and from shower chair in bathroom)  Stand to sit: Contact guard assistance(to shower chair and to bedside chair)  Ambulation  Ambulation?: Yes  Ambulation 1  Surface: level tile  Device: Hand-Held Assist  Assistance: Minimal assistance  Quality of Gait: significantly decreased radha, decreased step length/height bilaterally with shuffling steps, gait mildly unsteady, two small losses of balance with min assist to correct  Distance: 120', 120'  Comments: patient reaching out for objects for stabilization when HHA not provided, declined use of FWW stating \"I don't need that\"; seated rest break in between rounds of ambulation  Stairs/Curb  Stairs?: No     Balance  Sitting - Static: Good  Standing - Static: Fair(CGA with UE support)  Standing - Dynamic: Fair;-(min assist to ambulate with HHA)        Plan   Plan  Times per week: 2-5  Current Treatment Recommendations: Strengthening, Transfer Training, Endurance Training, Patient/Caregiver Education & Training, Balance Training, Gait Training, Functional Mobility Training, Stair training, Safety Education & Training  Safety Devices  Type of devices: Left in chair, Chair alarm in place, Call light within reach, Nurse notified, Gait belt    OutComes Score                                                  AM-PAC Score  AM-PAC Inpatient Mobility Raw Score : 18 (08/12/20 [facility])  AM-PAC Inpatient T-Scale Score : 43.63 (08/12/20 [facility])  Mobility Inpatient CMS 0-100% Score: 46.58 (08/12/20 [facility])  Mobility Inpatient CMS G-Code Modifier : CK (08/12/20 [facility])          Goals  Short term goals  Time Frame for Short term goals: discharge  Short term goal 1: patient will perform transfers sit<>stand with supervision  Short term goal 2: patient will ambulate 200' with least restrictive assistive device and supervision  Short term goal 3: patient will ascend/descend 5 steps with unilateral handrail and SBA  Patient Goals   Patient goals : to go home       Therapy Time   Individual Concurrent Group Co-treatment   Time In 1130         Time Out 1157         Minutes 27         Timed Code Treatment Minutes: 12 Minutes    Timed Code Treatment Minutes:  12 Minutes    Total Treatment Minutes:    12 minutes treatment + 15 minutes evaluation = 27 total treatment minutes  If patient discharges prior to next session this note will serve as a discharge summary.   Please see below for the latest assessment towards goals.    Janelle JHA [facility].

## 2020-08-12 NOTE — PROGRESS NOTES
Oncology / Hematology Care      Patient name:   Sherwin Valerio   Date:     8/12/2020           Interval History:    64 yo patient, well known to our practice      Percutaneous liver biopsy yesterday - no delayed effects    Depressed affect today, desires to be home          Allergies:    No Known Allergies       Medications:      Current Facility-Administered Medications:     acyclovir (ZOVIRAX) tablet 400 mg, 400 mg, Oral, BID, Eden Tracy., DO    dexamethasone (DECADRON) tablet 20 mg, 20 mg, Oral, Daily, Eden Tracy., DO, 20 mg at 08/12/20 0843    amLODIPine (NORVASC) tablet 10 mg, 10 mg, Oral, Daily, Madelyn Oseguera MD, 10 mg at 08/12/20 0843    heparin (porcine) injection 5,000 Units, 5,000 Units, Subcutaneous, Q8H, Leonardo Austin MD, 5,000 Units at 08/11/20 2124    atorvastatin (LIPITOR) tablet 40 mg, 40 mg, Oral, Nightly, Elena Delatorre MD, 40 mg at 08/11/20 2125    metoprolol tartrate (LOPRESSOR) tablet 25 mg, 25 mg, Oral, BID, Azucena Holstein, MD, 25 mg at 08/12/20 0843    sodium chloride flush 0.9 % injection 10 mL, 10 mL, Intravenous, 2 times per day, Azucena Holstein, MD, 10 mL at 08/12/20 0846    sodium chloride flush 0.9 % injection 10 mL, 10 mL, Intravenous, PRN, Elena Pond MD    acetaminophen (TYLENOL) tablet 650 mg, 650 mg, Oral, Q6H PRN, 650 mg at 08/09/20 0348 **OR** acetaminophen (TYLENOL) suppository 650 mg, 650 mg, Rectal, Q6H PRN, Elena Pond MD    promethazine (PHENERGAN) tablet 12.5 mg, 12.5 mg, Oral, Q6H PRN **OR** ondansetron (ZOFRAN) injection 4 mg, 4 mg, Intravenous, Q6H PRN, Elena Pond MD    glucose (GLUTOSE) 40 % oral gel 15 g, 15 g, Oral, PRN, Elena Pond MD    dextrose 50 % IV solution, 12.5 g, Intravenous, PRN, Elena Pond MD    glucagon (rDNA) injection 1 mg, 1 mg, Intramuscular, PRN, Azucena Holstein, MD    dextrose 5 % solution, 100 mL/hr, Intravenous, PRN, Azucena Holstein, MD        Review of Systems:    · History obtained from patient, otherwise, further 10 point ROS is negative        Physical Exam:    BP (!) 154/73   Pulse 70   Temp 97.6 °F (36.4 °C) (Oral)   Resp 18   Ht 5' 5\" (1.651 m)   Wt 109 lb 2 oz (49.5 kg)   SpO2 96%   BMI 18.16 kg/m²     General appearance: alert and cooperative; no acute distress  Head: NCAT, PERRLA, EOMI; oral and nasopharynx without lesions, dentition adequate repair  Neck: no JVD or thyromegaly  Lungs: clear to auscultation bilaterally; no audible rhonchi, rales, wheezes or crackles  Heart: regular rate and rhythm, S1, S2 normal; no murmurs, rubs or gallops  Abdomen: soft, non-tender and non-distended; normoactive, tympanic bowel sounds; no hepatosplenomegaly  Extremities: no cyanosis, clubbing, edema or asymmetry  Skin: no jaundice, purpura or petechiae  Lymph Nodes:  no auricular, cervical, supraclavicular, axillary, inguinal or popliteal lymphadenopathy  Neurologic:  CN 2-12 intact; no focal motor, sensory or cerebellar deficits        Laboratory Evaluation:      CBC:   Lab Results   Component Value Date    WBC 8.0 08/12/2020    NEUTROABS 7.1 08/12/2020    HGB 9.3 08/12/2020    MCV 88.7 08/12/2020     08/12/2020       BMP:   Lab Results   Component Value Date     08/12/2020    K 4.2 08/12/2020    K 3.8 08/09/2020    CO2 20 08/12/2020    BUN 19 08/12/2020    CREATININE 1.1 08/12/2020    MG 1.80 08/08/2020    TSH 0.93 03/30/2020       HEPATIC:  Lab Results   Component Value Date    AST 15 08/12/2020    ALT 7 08/12/2020    ALKPHOS 89 08/12/2020    PROT 7.6 08/12/2020    PROT 7.5 04/09/2012    BILITOT 0.3 08/12/2020    BILIDIR <0.2 08/12/2020     03/31/2020           Radiology Evaluation:    CT A/P:    Development of hypodense lesion within liver likely relates to primary or metastatic neoplasm reduced since 2017 examination.   .         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  Clinical correlation suggested.         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 Vascular consult may be useful if indicated.         Marked colonic diverticulosis without diverticulitis.         No hydronephrosis         No change in left adrenal nodule         Marked lytic lesions noted consistent with patient's myeloma involving the bilateral ribs. Lower thoracic spine, lumbar spine, pelvis, sacrum and hips and proximal femurs with lytic lesion involving the left iliac bone with pathologic fracture with gap    involving the left iliac bone present previously. Assessment / Plan:      Multiple Myeloma    - Relapsed/Refractory, CyBorD    Hypercalcemia due to MM  Acute on chronic kidney failure - Cr now 1.1      - response to pamidronate may take 2-3 days    - in the interim:  - dexamethasone, 20 mg daily for 4 days   - calcitonin 4 units/kg q12 hrs, for 2 days, can stop after today    - to begin ai/estelle/dex today; appreciate pharmacy's efforts    - likely can be discharged tomorrow after day #2 of ai        As always, thank you for allowing me the opportunity to participate in the care of your patient. Please do not hesitate to contact me with questions or concerns regarding further management. Luca Gustafson DO, MS  Oncology/Hematology Care    Please contact via:  1. [employer]  2.   Cell Phone:  [phone]    8/12/2020   9:34 AM

## 2020-08-12 NOTE — ONCOLOGY
Chemotherapy drug Daratumumab independently verified with Yovany De Leon RN prior to administration. Informed consent for chemotherapy administration verified. Original order, appropriateness of regimen, drug supplied, height, weight, BSA, dose calculations, expiration dates/times, drug appearance, and two patient identifiers were verified by both RNs. Drug checked for vesicant/irritant status and for risk of hypersensitivity. Most recent laboratory values and allergies, were reviewed. Positive, brisk blood return via PIV was confirmed prior to administration. Andres Monte and Yovany De Leon RN verified correct rate of chemotherapy and maintenance IV fluids. Monitoring during infusion done per policy, see DocFlowsheets. See progress note for pt tolerance. Will continue to monitor.

## 2020-08-12 NOTE — PLAN OF CARE
with chlorhexidine and linens changed daily per protocol. Pt verbalizes understanding of low microbial diet. Will continue to monitor. Problem: Discharge Planning:  Goal: Discharged to appropriate level of care  Description: Discharged to appropriate level of care  8/12/2020 0548 by Adithya Winn RN  Outcome: Ongoing  Note: Continue to discuss plan of care with Markos Constantino. Problem: Skin Integrity:  Goal: Will show no infection signs and symptoms  Description: Will show no infection signs and symptoms  8/12/2020 0548 by Adithya Winn RN  Outcome: Ongoing  Note: Markos Constantino has a liver biopsy site in her RUQ of her abdomen. No bleeding or irritation at the site. Will continue to monitor for additional skin breakdown. Problem: Venous Thromboembolism:  Goal: Will show no signs or symptoms of venous thromboembolism  Description: Will show no signs or symptoms of venous thromboembolism  8/12/2020 0548 by Adithya Winn RN  Outcome: Ongoing  Note: Pt with recent hx of clot. Currently on treatment dose Heparin. Pt at risk of bleeding d/t anticoagulation. Cautioned pt on safety precautions to decrease risk of bleeding. Will notify provider if platelets drop below 50,000 and/or if pt has invasive procedure scheduled in order to hold anticoagulation.

## 2020-08-12 NOTE — PROGRESS NOTES
After increasing rate of Daratumumab (per titration order) to 100ml/hr. Pt became tachycardic to 101bpm from baseline of 70-80bpm. Infusion held. Pt then with new onset vomiting. Notified on call Dr. Armando Cerrato, administered 60mg IV solumedrol per order. After symptoms resolved, infusion restarted at 50ml/hr to remain at this rate for the remainder of infusion. Will continue to monitor closely.

## 2020-08-13 LAB
ALBUMIN SERPL-MCNC: 3.5 G/DL (ref 3.4–5)
ANION GAP SERPL CALCULATED.3IONS-SCNC: 13 MMOL/L (ref 3–16)
BASOPHILS ABSOLUTE: 0 K/UL (ref 0–0.2)
BASOPHILS RELATIVE PERCENT: 0.2 %
BUN BLDV-MCNC: 25 MG/DL (ref 7–20)
CALCIUM SERPL-MCNC: 10.5 MG/DL (ref 8.3–10.6)
CHLORIDE BLD-SCNC: 108 MMOL/L (ref 99–110)
CO2: 20 MMOL/L (ref 21–32)
CREAT SERPL-MCNC: 1.1 MG/DL (ref 0.6–1.2)
EOSINOPHILS ABSOLUTE: 0 K/UL (ref 0–0.6)
EOSINOPHILS RELATIVE PERCENT: 0 %
GFR AFRICAN AMERICAN: 59
GFR NON-AFRICAN AMERICAN: 49
GLUCOSE BLD-MCNC: 194 MG/DL (ref 70–99)
HCT VFR BLD CALC: 26.6 % (ref 36–48)
HEMOGLOBIN: 8.7 G/DL (ref 12–16)
LYMPHOCYTES ABSOLUTE: 0.1 K/UL (ref 1–5.1)
LYMPHOCYTES RELATIVE PERCENT: 1.4 %
MCH RBC QN AUTO: 29.1 PG (ref 26–34)
MCHC RBC AUTO-ENTMCNC: 32.8 G/DL (ref 31–36)
MCV RBC AUTO: 88.5 FL (ref 80–100)
MONOCYTES ABSOLUTE: 0 K/UL (ref 0–1.3)
MONOCYTES RELATIVE PERCENT: 0.4 %
NEUTROPHILS ABSOLUTE: 4 K/UL (ref 1.7–7.7)
NEUTROPHILS RELATIVE PERCENT: 98 %
PDW BLD-RTO: 19.7 % (ref 12.4–15.4)
PHOSPHORUS: 3.3 MG/DL (ref 2.5–4.9)
PLATELET # BLD: 149 K/UL (ref 135–450)
PMV BLD AUTO: 7.9 FL (ref 5–10.5)
POTASSIUM SERPL-SCNC: 3.8 MMOL/L (ref 3.5–5.1)
RBC # BLD: 3 M/UL (ref 4–5.2)
SODIUM BLD-SCNC: 141 MMOL/L (ref 136–145)
WBC # BLD: 4 K/UL (ref 4–11)

## 2020-08-13 PROCEDURE — 2580000003 HC RX 258: Performed by: INTERNAL MEDICINE

## 2020-08-13 PROCEDURE — 96415 CHEMO IV INFUSION ADDL HR: CPT

## 2020-08-13 PROCEDURE — 6360000002 HC RX W HCPCS: Performed by: INTERNAL MEDICINE

## 2020-08-13 PROCEDURE — 80069 RENAL FUNCTION PANEL: CPT

## 2020-08-13 PROCEDURE — 99232 SBSQ HOSP IP/OBS MODERATE 35: CPT | Performed by: INTERNAL MEDICINE

## 2020-08-13 PROCEDURE — 2580000003 HC RX 258: Performed by: STUDENT IN AN ORGANIZED HEALTH CARE EDUCATION/TRAINING PROGRAM

## 2020-08-13 PROCEDURE — 6370000000 HC RX 637 (ALT 250 FOR IP): Performed by: INTERNAL MEDICINE

## 2020-08-13 PROCEDURE — 6360000002 HC RX W HCPCS: Performed by: STUDENT IN AN ORGANIZED HEALTH CARE EDUCATION/TRAINING PROGRAM

## 2020-08-13 PROCEDURE — 6370000000 HC RX 637 (ALT 250 FOR IP): Performed by: STUDENT IN AN ORGANIZED HEALTH CARE EDUCATION/TRAINING PROGRAM

## 2020-08-13 PROCEDURE — 97116 GAIT TRAINING THERAPY: CPT

## 2020-08-13 PROCEDURE — 2500000003 HC RX 250 WO HCPCS: Performed by: INTERNAL MEDICINE

## 2020-08-13 PROCEDURE — 96413 CHEMO IV INFUSION 1 HR: CPT

## 2020-08-13 PROCEDURE — 85025 COMPLETE CBC W/AUTO DIFF WBC: CPT

## 2020-08-13 PROCEDURE — 2060000000 HC ICU INTERMEDIATE R&B

## 2020-08-13 PROCEDURE — 97110 THERAPEUTIC EXERCISES: CPT

## 2020-08-13 RX ORDER — MONTELUKAST SODIUM 10 MG/1
10 TABLET ORAL ONCE
Status: COMPLETED | OUTPATIENT
Start: 2020-08-13 | End: 2020-08-13

## 2020-08-13 RX ORDER — ACETAMINOPHEN 325 MG/1
650 TABLET ORAL ONCE
Status: COMPLETED | OUTPATIENT
Start: 2020-08-13 | End: 2020-08-13

## 2020-08-13 RX ORDER — DIPHENHYDRAMINE HCL 25 MG
50 TABLET ORAL ONCE
Status: COMPLETED | OUTPATIENT
Start: 2020-08-13 | End: 2020-08-13

## 2020-08-13 RX ORDER — DEXAMETHASONE 4 MG/1
20 TABLET ORAL ONCE
Status: COMPLETED | OUTPATIENT
Start: 2020-08-13 | End: 2020-08-13

## 2020-08-13 RX ADMIN — ATORVASTATIN CALCIUM 40 MG: 40 TABLET, FILM COATED ORAL at 22:35

## 2020-08-13 RX ADMIN — AMLODIPINE BESYLATE 10 MG: 10 TABLET ORAL at 09:08

## 2020-08-13 RX ADMIN — Medication 10 ML: at 22:36

## 2020-08-13 RX ADMIN — HEPARIN SODIUM 5000 UNITS: 5000 INJECTION INTRAVENOUS; SUBCUTANEOUS at 12:54

## 2020-08-13 RX ADMIN — DEXAMETHASONE 20 MG: 4 TABLET ORAL at 16:22

## 2020-08-13 RX ADMIN — DIPHENHYDRAMINE HYDROCHLORIDE 50 MG: 25 TABLET ORAL at 16:22

## 2020-08-13 RX ADMIN — METOPROLOL TARTRATE 25 MG: 25 TABLET, FILM COATED ORAL at 09:08

## 2020-08-13 RX ADMIN — MONTELUKAST 10 MG: 10 TABLET, FILM COATED ORAL at 16:22

## 2020-08-13 RX ADMIN — HEPARIN SODIUM 5000 UNITS: 5000 INJECTION INTRAVENOUS; SUBCUTANEOUS at 05:05

## 2020-08-13 RX ADMIN — DARATUMUMAB 400 MG: 100 INJECTION, SOLUTION, CONCENTRATE INTRAVENOUS at 17:51

## 2020-08-13 RX ADMIN — Medication 10 ML: at 09:09

## 2020-08-13 RX ADMIN — METOPROLOL TARTRATE 25 MG: 25 TABLET, FILM COATED ORAL at 22:35

## 2020-08-13 RX ADMIN — HEPARIN SODIUM 5000 UNITS: 5000 INJECTION INTRAVENOUS; SUBCUTANEOUS at 22:35

## 2020-08-13 RX ADMIN — ACYCLOVIR 400 MG: 400 TABLET ORAL at 09:08

## 2020-08-13 RX ADMIN — ACETAMINOPHEN 650 MG: 325 TABLET ORAL at 16:22

## 2020-08-13 RX ADMIN — ACYCLOVIR 400 MG: 400 TABLET ORAL at 22:35

## 2020-08-13 RX ADMIN — POTASSIUM PHOSPHATE, MONOBASIC POTASSIUM PHOSPHATE, DIBASIC 30 MMOL: 224; 236 INJECTION, SOLUTION, CONCENTRATE INTRAVENOUS at 02:52

## 2020-08-13 ASSESSMENT — PAIN SCALES - GENERAL
PAINLEVEL_OUTOF10: 0

## 2020-08-13 NOTE — PROGRESS NOTES
Nephrology  Note                                                                                                                                                                                                                                                                                                                                                               Office : [phone]     Fax :[phone]              Patient's Name: Regla Turner  8/13/2020    Reason for Consult:  WANDER on CKD      History of Present Ilness:    Regla Turner is a 65 y.o. female with Multiple myeloma, CKD 3 not on dialysis, HTN, CVA, Porcelain gall bladder who presents with chief complaint of urinary retention. Patient reports that in the past 24 hours she has not urinated. Pt has multiple Myeloma on A acyclovir, PPI. Yesterday she noticed that her diaper is dry and she did not void all day. This is abnormal for her and has never happened in the past.  She denies starting any new medications. She does have urinary urgency. There is no associated abdominal pain. She denies any fever or chills. She does have good p.o. intake today as she has drinking approximately 36 ounces of fluids. Yesterday she voided approximately 4-5 times. She recently had a tunneled dialysis catheter placed which is now removed. During that time she was still voiding well. She denies any lower extremity swelling. De Leon is placed in the ER and she has only made 100 cc of urine. Patient has history of myeloma kidney and was on dialysis. Her kidney function did get better with the chemo. She was off dialysis.     Patient has new onset WANDER. INTERVAL HISTORY    8/13: Pt is sad and anxious to go home. She does not like eating the hospital food and has had decreased appetite. She is drinking plenty of fluids and making lots of urine. Overall she feels much better than when she was first admitted. 8/12: Pt s/p liver biopsy.  Reports no pain or discomfort. She is extremely anxious to get home. She is making a little bit of urine, yellow in color. She has had decreased appetite as she does not like the food here, but she is drinking fluids. : Pt scheduled for liver biopsy today. She is feeling better today, but does endorse pain at her IV site. She is making lots of urine, yellow in color. Her appetite is decreased, but she is drinking fluids and sleeping okay. Pt is anxious to get home. 8/10: Feels better  Shortness of breath: No   UOP: Good   Creat: trending down  Ca worsening      Past Medical History:   Diagnosis Date    Allergic rhinitis     Anemia     CAD (coronary artery disease)     CAD (coronary artery disease)     Diverticulosis     ESRD (end stage renal disease) on dialysis ([facility] [address] 2020    GERD (gastroesophageal reflux disease)     History of colonoscopy     Hyperlipidemia     Hypertension     Myocardial infarction, inferior wall [facility]) 2006       Past Surgical History:   Procedure Laterality Date    BREAST REDUCTION SURGERY  [facility]ilat     SECTION  x3    CT BONE MARROW BIOPSY  2020    CT BONE MARROW BIOPSY 2020 [facility] CT [facility]    CT NEEDLE BIOPSY LIVER PERCUTANEOUS  2020    CT NEEDLE BIOPSY LIVER PERCUTANEOUS 2020 [facility] CT SCAN    HYSTERECTOMY      TYMPANOPLASTY         Family History   Problem Relation Age of Onset    Heart Disease Mother     High Cholesterol Mother     Heart Disease Sister         hypertension    Diabetes Sister         hypertension    High Cholesterol Sister         hypertension    Heart Disease Brother     High Cholesterol Brother     Stroke Brother         hypertension        reports that she has been smoking. She has a 22.50 pack-year smoking history. She has never used smokeless tobacco. She reports that she does not drink alcohol or use drugs. Allergies:  Patient has no known allergies.     Current Medications:    acyclovir (ZOVIRAX) tablet 400 mg, BID  acetaminophen (TYLENOL) tablet 650 mg, Q24H  diphenhydrAMINE (BENADRYL) tablet 50 mg, Q24H  montelukast (SINGULAIR) tablet 10 mg, Q24H  dexamethasone (DECADRON) tablet 20 mg, Q24H  daratumumab (DARZALEX) 400 mg in sodium chloride 0.9 % 500 mL chemo infusion, Q24H  lenalidomide (REVLIMID) chemo capsule 15 mg, Every Other Day  amLODIPine (NORVASC) tablet 10 mg, Daily  heparin (porcine) injection 5,000 Units, Q8H  atorvastatin (LIPITOR) tablet 40 mg, Nightly  metoprolol tartrate (LOPRESSOR) tablet 25 mg, BID  sodium chloride flush 0.9 % injection 10 mL, 2 times per day  sodium chloride flush 0.9 % injection 10 mL, PRN  acetaminophen (TYLENOL) tablet 650 mg, Q6H PRN    Or  acetaminophen (TYLENOL) suppository 650 mg, Q6H PRN  promethazine (PHENERGAN) tablet 12.5 mg, Q6H PRN    Or  ondansetron (ZOFRAN) injection 4 mg, Q6H PRN  glucose (GLUTOSE) 40 % oral gel 15 g, PRN  dextrose 50 % IV solution, PRN  glucagon (rDNA) injection 1 mg, PRN  dextrose 5 % solution, PRN      Physical exam:     Vitals:  BP (!) 125/52   Pulse 73   Temp 98.6 °F (37 °C) (Oral)   Resp 20   Ht 5' 5\" (1.651 m)   Wt 108 lb 0.4 oz (49 kg)   SpO2 99%   BMI 17.98 kg/m²     Constitutional:  awake, NAD  HEENT:  MMM  Neck: supple, no JVD  Cardiovascular:  RRR, S1, S2, no M/R/G  Respiratory: CTA bilaterally, equal breath sounds  Abdomen:  +BS, soft, ND, NT  Ext: No lower extremity edema  Skin: dry/intact  CNS: alert, no agitation       Data:   Labs:  CBC:   Recent Labs     08/11/20 0436 08/12/20 0457 08/13/20 0414   WBC 6.8 8.0 4.0   HGB 9.6* 9.3* 8.7*    188 149     BMP:    Recent Labs     08/11/20  0436 08/12/20  0457 08/13/20  0414    140 141   K 3.4* 4.2 3.8    107 108   CO2 22 20* 20*   BUN 16 19 25*   CREATININE 1.1 1.1 1.1   GLUCOSE 97 176* 194*     Ca/Mg/Phos:   Recent Labs     08/11/20  0436 08/12/20  0457 08/13/20  0414   CALCIUM 12.4* 11.5* 10.5   PHOS 2.9 2.4* 3.3     Hepatic:   Recent Labs     08/12/20 0457   AST 15   ALT 7*   BILITOT 0.3   ALKPHOS 89     Troponin: No results for input(s): TROPONINI in the last 72 hours. BNP: No results for input(s): BNP in the last 72 hours. Lipids: No results for input(s): CHOL, TRIG, HDL, LDLCALC, LABVLDL in the last 72 hours. ABGs: No results for input(s): PHART, PO2ART, QED1GPY in the last 72 hours. INR: No results for input(s): INR in the last 72 hours. UA:  No results for input(s): [place] Dach, GLUCOSEU, BILIRUBINUR, KETUA, SPECGRAV, BLOODU, PHUR, PROTEINU, UROBILINOGEN, NITRU, LEUKOCYTESUR, Babetta Dubin in the last 72 hours. Urine Microscopic:   No results for input(s): LABCAST, BACTERIA, COMU, HYALCAST, WBCUA, RBCUA, EPIU in the last 72 hours. Urine Culture: No results for input(s): LABURIN in the last 72 hours. Urine Chemistry:   No results for input(s): [place], PROTEINUR, NAUR in the last 72 hours. IMAGING:      Assessment/Plan   Michael  is a 65 y.o. female with Multiple myeloma, CKD 3 not on dialysis, HTN, CVA, Porcelain gall bladder who presented with chief complaint of urinary retention.      WANDER on CKD 3  - suspect due to possible worsening of multiple myeloma - hx of myeloma kidney  - Cr stable @ 1.1, OK to go from nephrology stand point and follow up outpatient  - no anion gap  - continue to monitor I/O  - prot/Cr ratio 1.48  - Very High risk for WANDER as her light chains are tubulotoxic   - She needed RRT sec to Myeloma kidney which recovered   -  IVF discontinued     Hypercalcemia - sec to MM  - resolved, 11.5 --> 10.5  - pamidronate x 1 - 8/10  - calcitonin started 8/11  - dexamethasone started 8/11    Multiple myeloma   - relapsed/refractory, CyBorD  - heme/onc following  - plan to start daratumumab/lenalidomide/dexamethasone 8/12/20    Hyponatremia - resolved  - monitor    Hypokalemia - resolved  - monitor    HTN  - BP high today, monitor  - continue amlodipine, lopressor     Normocytic anemia  - Hgb stable at pt's baseline  - hx multiple myeloma  - monitor H/H  - hematology/oncology team following    Incidental liver lesion  - seen on CT abd pelvis 8/6/20  - Oncology consulted  - Liver biopsy 8/11/20 revealed plasma cell neoplasm with lambda light chain        restricted plasma cells consistent with relapsed multiple myeloma    New Anuerysm on CTAP  - 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

## 2020-08-13 NOTE — PROGRESS NOTES
Internal Medicine Progress Note PGY-1    Admit Date: 8/5/2020    CC: Unable to make urine    Interval history:   One instance of N/V last night after starting infusion of chemotherapy. Infusion was stopped and restarted which the patient then tolerated well. No complaints when I was seeing her this morning. Medications:     Scheduled Meds:   acyclovir  400 mg Oral BID    acetaminophen  650 mg Oral Q24H    diphenhydrAMINE  50 mg Oral Q24H    montelukast  10 mg Oral Q24H    dexamethasone  20 mg Oral Q24H    daratumumab(DARZALEX) chemo infusion  400 mg Intravenous Q24H    lenalidomide  15 mg Oral Every Other Day    amLODIPine  10 mg Oral Daily    heparin (porcine)  5,000 Units Subcutaneous Q8H    atorvastatin  40 mg Oral Nightly    metoprolol tartrate  25 mg Oral BID    sodium chloride flush  10 mL Intravenous 2 times per day     Continuous Infusions:   dextrose       PRN Meds:sodium chloride flush, acetaminophen **OR** acetaminophen, promethazine **OR** ondansetron, glucose, dextrose, glucagon (rDNA), dextrose    Objective:     Vitals:   T-max:  Patient Vitals for the past 8 hrs:   BP Temp Temp src Pulse Resp SpO2 Weight   08/13/20 0905 122/61 98.2 °F (36.8 °C) Oral 62 17 99 % --   08/13/20 0807 -- -- -- -- -- -- 108 lb 0.4 oz (49 kg)   08/13/20 0304 (!) 125/52 -- -- -- -- -- --   08/13/20 0256 -- 98.6 °F (37 °C) Oral 73 20 99 % --       Intake/Output Summary (Last 24 hours) at 8/13/2020 1040  Last data filed at 8/13/2020 1029  Gross per 24 hour   Intake 1222 ml   Output 650 ml   Net 572 ml     Physical Exam   Constitutional: Patient is oriented to person, place, and time. Appears well-developed and well-nourished. HENT: Head, normocephalic and atraumatic. External ears normal.   Cardiovascular: Regular rate and rythem with normal heart sounds and intact distal pulses. No murmurs, rubs, or gallops  Pulmonary/Chest: Effort normal and breath sounds normal. No respiratory distress.  No wheezes, rhonchi, or rales   Abdominal: Soft, non tender, non distended with normal bowel sounds. No rebound or guarding. Musculoskeletal: Normal range of motion with no extremity edema    LABS:    CBC:   Recent Labs     08/11/20 0436 08/12/20 0457 08/13/20 0414   WBC 6.8 8.0 4.0   HGB 9.6* 9.3* 8.7*   HCT 29.5* 28.7* 26.6*    188 149   MCV 88.4 88.7 88.5     Renal:    Recent Labs     08/11/20 0436 08/12/20 0457 08/13/20 0414    140 141   K 3.4* 4.2 3.8    107 108   CO2 22 20* 20*   BUN 16 19 25*   CREATININE 1.1 1.1 1.1   GLUCOSE 97 176* 194*   CALCIUM 12.4* 11.5* 10.5   PHOS 2.9 2.4* 3.3   ANIONGAP 14 13 13     Hepatic:   Recent Labs     08/11/20 0436 08/12/20 0457 08/13/20 0414   AST  --  15  --    ALT  --  7*  --    BILITOT  --  0.3  --    BILIDIR  --  <0.2  --    PROT  --  7.6  --    LABALBU 3.5 3.6 3.5   ALKPHOS  --  89  --      Troponin: No results for input(s): TROPONINI in the last 72 hours. BNP: No results for input(s): BNP in the last 72 hours. Lipids: No results for input(s): CHOL, HDL in the last 72 hours. Invalid input(s): LDLCALCU, TRIGLYCERIDE  ABGs:  No results for input(s): PHART, TOY5SVK, PO2ART, XWF8QWV, BEART, THGBART, J5JVKIPH, SPI9VQD in the last 72 hours. INR: No results for input(s): INR in the last 72 hours. Lactate: No results for input(s): LACTATE in the last 72 hours. Cultures:  -----------------------------------------------------------------  RAD:   CT AP 8/6/2020  1. Development of hypodense lesion within liver likely relates to primary or metastatic neoplasm reduced since 2017 examination.   .         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 Clinical correlation suggested.         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  Vascular consult may be useful if indicated.         Marked colonic diverticulosis without diverticulitis.      No hydronephrosis         No change in left adrenal nodule         Marked lytic lesions noted consistent with patient's myeloma involving the bilateral ribs. Lower thoracic spine, lumbar spine, pelvis, sacrum and hips and proximal femurs with lytic lesion involving the left iliac bone with pathologic fracture with gap    involving the left iliac bone present previously. Assessment/Plan:   65 y.o. female with PMHx of Multiple myeloma, ESRD not on dialysis, HTN, CVA, Porcelain gall bladder who presented to [facility]. for un able to make urine of 1 day duration. WANDER on CKD, resolving   Pt has hx of ESRD, s/p dialysis, p/w un uria,  Cr 2.9, K 5.7. K has improved after lasix and fluids. . She has produced ample urine since admission. Patient appears to have been fluid down causing lack of urination and increased Cr. CT AP showed no evidence of obstruction. Concern for myeloma nephropathy. Urea excretion 61%. Lambda FLC to 878 with K FLC to 9.47. Cr has improved to baseline.   -Nephrology consult,likely myeloma nephropathy  -Oncology consult, likely active disease, liver biopsy and considering adjusting therapy  - holding acyclovir for now, will restart soon  - K/L ratio 0.01 with lambda 878 and kappa 9.47 suggestive myeloma is active     Hypercalcemia, resolving  Patient's hypercalcemia has slowly increased to 12.4. Received pamidronate on 8/10. Calcium down to 10.5 today.   -dexamethasone 20 mg daily for 4 days  -calcitonin 4 units/kg q12 hrs finished    Multiple myloma  Hx of multiple myloma, no sign of infection, WANDER on ckd. WANDER likely due to myeloma and has increased lambda FLC to 827 with kappa to 9.47.  New liver lesion on CT AP concerning for myeloma vs cholangiocarcinoma with hx of porcelain gallbladder.   -we will hold Acyclovir in setting of WANDER on ESRD, hyperkalemia  -Consult oncology, liver biopsy on 8/11  -Currently undergoing chemotherapy    Hypertension  - continue metoprolol  -

## 2020-08-13 NOTE — ONCOLOGY
4 Eyes Admission Assessment     I agree as the admission nurse that 2 RN's have performed a thorough Head to Toe Skin Assessment on the patient. ALL assessment sites listed below have been assessed on admission. Areas assessed by both nurses: Larwence Dena  [x]   Head, Face, and Ears   [x]   Shoulders, Back, and Chest  [x]   Arms, Elbows, and Hands   [x]   Coccyx, Sacrum, and Ischum  [x]   Legs, Feet, and Heels        Does the Patient have Skin Breakdown?   NO         Niall Prevention initiated:  Yes   Wound Care Orders initiated:  NA      [facility] nurse consulted for Pressure Injury (Stage 3,4, Unstageable, DTI, NWPT, and Complex wounds) or Niall score 18 or lower:  NA      Nurse 1 eSignature: Rebecca Horner RN    Plan for DC home tomorrow, no specialty bed mattress to be ordered at this time, due to pending DC.     **SHARE this note so that the co-signing nurse is able to place an eSignature**    Nurse 2 eSignature: Electronically signed by Claudette Silber, RN on 8/14/20 at 3:04 AM EDT

## 2020-08-13 NOTE — PROGRESS NOTES
Physical Therapy  Facility/Department: [address] [facility]  Daily Treatment Note  NAME: Jan Hi  : 1950  MRN: [id number]    Date of Service: 2020    Discharge Recommendations:    Jan Hi scored a 18/24 on the AM-PAC short mobility form. Current research shows that an AM-PAC score of 18 or greater is typically associated with a discharge to the patient's home setting. Based on the patient's AM-PAC score and their current functional mobility deficits, it is recommended that the patient have 2-3 sessions per week of Physical Therapy at d/c to increase the patient's independence. At this time, this patient demonstrates the endurance and safety to discharge home with home PT and a follow up treatment frequency of 2-3x/wk. Please see assessment section for further patient specific details. HOME HEALTH CARE: LEVEL 1 STANDARD    - Initial home health evaluation to occur within 24-48 hours, in patient home   - Therapy to evaluate with goal of regaining prior level of functioning   - Therapy to evaluate if patient has [address] needs for personal care      PT Equipment Recommendations  Equipment Needed: No  Other: patient has FWW and cane    Assessment   Body structures, Functions, Activity limitations: Decreased functional mobility ; Decreased safe awareness;Decreased balance;Decreased endurance  Assessment: Patient showed improved steadiness with use of FWW during ambulation, requiring CGA and showing no loss of balance. Patient educated on use upon d/c to improve safety with mobility; she and her  verbalized understanding. Patient planning to d/c home tomorrow with 24 hour supervision/assistance from family; reports daughter will be there to assist on stairs if needed. Recommend continued skilled PT upon discharge. Will follow while in acute care setting to maximize independence with mobility.   Treatment Diagnosis: impaired mobility associated with WANDER  Prognosis: Good  Patient Education: Educated on role of PT, safety with mobility, d/c recommendations; patient verbalized understanding. REQUIRES PT FOLLOW UP: Yes  Activity Tolerance  Activity Tolerance: Patient Tolerated treatment well;Patient limited by endurance; Patient limited by fatigue     Patient Diagnosis(es): The primary encounter diagnosis was Acute renal failure, unspecified acute renal failure type ([address] A diagnosis of Pseudoaneurysm ([address] was also pertinent to this visit. has a past medical history of Allergic rhinitis, Anemia, CAD (coronary artery disease), CAD (coronary artery disease), Diverticulosis, ESRD (end stage renal disease) on dialysis ([address] GERD (gastroesophageal reflux disease), History of colonoscopy, Hyperlipidemia, Hypertension, and Myocardial infarction, inferior wall ([address] has a past surgical history that includes  section (x3); Hysterectomy; Tympanoplasty; Breast reduction surgery ([facility]); CT BIOPSY BONE MARROW (2020); and CT NEEDLE BIOPSY LIVER PERCUTANEOUS (2020). Restrictions  Position Activity Restriction  Other position/activity restrictions: up with assist  Subjective   General  Chart Reviewed: Yes  Additional Pertinent Hx: Patient is a 66 y/o female admitted  with urinary retention. CT abdomen (+) for new liver lesion; biopsy on 2020. PMH significant for HTN, HLD, CAD, MI, ESRD, multiple myeloma. Response To Previous Treatment: Patient with no complaints from previous session. Family / Caregiver Present: Yes(spouse)  Referring Practitioner: Kalani Pardo MD  Subjective  Subjective: Patient agreeable to PT treatment, hopeful for d/c home tomorrow.   General Comment  Comments: Patient supine in bed upon arrival.  Pain Screening  Patient Currently in Pain: No  Vital Signs  Patient Currently in Pain: No       Orientation  Orientation  Overall Orientation Status: Within Functional Limits  Cognition   Cognition  Overall Cognitive Status: WFL  Cognition Comment: decreased insight into deficits  Objective   Bed mobility  Supine to Sit: Stand by assistance(increased time to complete tasks, movement very effortful, head of bed elevated)  Comment: patient sitting up in bedside chair at end of session  Transfers  Sit to Stand: Contact guard assistance(from EOB, verbal cues for UE placement)  Stand to sit: Contact guard assistance(to bedside chair, verbal cues for safety and reach back prior to sitting)  Ambulation  Ambulation?: Yes  Ambulation 1  Surface: level tile  Device: Rolling Walker  Assistance: Contact guard assistance  Quality of Gait: significantly decreased radha, decreased step length/height bilaterally with shuffling steps, gait fairly steady with no loss of balance noted, cues for safe use of FWW  Distance: 200' in room and hallways returning to bedside chair  Stairs/Curb  Stairs?: No     Balance  Sitting - Static: Good  Standing - Static: Fair(CGA with UE support)  Standing - Dynamic: Fair(CGA to ambulate with FWW)  Exercises  Hip Flexion: seated marching x 10 reps bilaterally  Knee Long Arc Quad: x10 reps bilaterally  Ankle Pumps: x10 reps bilaterally                        OutComes Score                                                     AM-PAC Score  AM-PAC Inpatient Mobility Raw Score : 18 (08/13/20 1245)  AM-PAC Inpatient T-Scale Score : 43.63 (08/13/20 1245)  Mobility Inpatient CMS 0-100% Score: 46.58 (08/13/20 1245)  Mobility Inpatient CMS G-Code Modifier : CK (08/13/20 1245)          Goals  Short term goals  Time Frame for Short term goals: discharge  Short term goal 1: patient will perform transfers sit<>stand with supervision - ongoing 8/13  Short term goal 2: patient will ambulate 200' with least restrictive assistive device and supervision - ongoing 8/13  Short term goal 3: patient will ascend/descend 5 steps with unilateral handrail and SBA - ongoing 8/13  Patient Goals   Patient goals : to go home    Plan    Plan  Times per week: 2-5  Current Treatment Recommendations: Strengthening, Transfer Training, Endurance Training, Patient/Caregiver Education & Training, Balance Training, Gait Training, Functional Mobility Training, Stair training, Safety Education & Training  Safety Devices  Type of devices: Left in chair, Chair alarm in place, Call light within reach, Nurse notified, Gait belt     Therapy Time   Individual Concurrent Group Co-treatment   Time In 1144         Time Out 1214         Minutes 30         Timed Code Treatment Minutes: 30 Minutes     If patient discharges prior to next session this note will serve as a discharge summary. Please see below for the latest assessment towards goals.    Janelle [facility] [address]

## 2020-08-13 NOTE — PLAN OF CARE
Problem: Falls - Risk of:  Goal: Will remain free from falls  Description: Will remain free from falls  8/13/2020 1231 by Grace Caceres RN  Outcome: Ongoing    Pt is a high fall risk. JESSICA/CEA active. Call bell within reach. Problem: Pain:  Goal: Pain level will decrease  Description: Pain level will decrease  8/13/2020 1231 by Grace Caceres RN  Outcome: Ongoing    No c/o pain this shift. Problem: Bleeding:  Goal: Will show no signs and symptoms of excessive bleeding  Description: Will show no signs and symptoms of excessive bleeding  8/13/2020 1231 by Grace Caceres RN  Outcome: Ongoing    Patient's hemoglobin this AM:   Recent Labs     08/13/20  0414   HGB 8.7*     Patient's platelet count this AM:   Recent Labs     08/13/20  0414       Thrombocytopenia not present at this time. Patient showing no signs or symptoms of active bleeding. Transfusion not indicated at this time. Patient verbalizes understanding of all instructions. Will continue to assess and implement POC. Call light within reach and hourly rounding in place. Problem: PROTECTIVE PRECAUTIONS  Goal: Patient will remain free of nosocomial Infections  8/13/2020 1231 by Grace Caceres RN  Outcome: Ongoing    Pt afebrile, no s/sx of infection noted. Problem: Discharge Planning:  Goal: Discharged to appropriate level of care  Description: Discharged to appropriate level of care  Outcome: Ongoing   Pt verbalizes understanding of POC. Problem: Activity:  Goal: Ability to tolerate increased activity will improve  Description: Ability to tolerate increased activity will improve  Outcome: Ongoing    Pt OOB with X 1 assist.  JESSICA active. PT did work with PT/OT today for home eval.       Problem: Skin Integrity:  Goal: Will show no infection signs and symptoms  Description: Will show no infection signs and symptoms  8/13/2020 1231 by Grace Caceres RN  Outcome: Ongoing    Skin remains intact.    4 eyes

## 2020-08-13 NOTE — PROGRESS NOTES
2 RN's have performed a thorough Head to Toe Skin Assessment on the patient. ALL assessment sites listed below have been assessed on admission. Areas assessed by both nurses: Martina Innocent  [x]   Head, Face, and Ears   [x]   Shoulders, Back, and Chest  [x]   Arms, Elbows, and Hands   [x]   Coccyx, Sacrum, and Ischum  [x]   Legs, Feet, and Heels        Does the Patient have Skin Breakdown? No         Niall Prevention initiated:  Yes   Wound Care Orders initiated:  No      [facility] nurse consulted for Pressure Injury (Stage 3,4, Unstageable, DTI, NWPT, and Complex wounds) or Niall score 18 or lower:  No      Nurse 1 eSignature: Electronically signed by Kinga Salter RN on 8/13/20 at 6:52 AM EDT    Nurse 2 eSignature: Dg Navas RN    No plan for specialty bed at this time, pt to be dc'd home tomorrow.

## 2020-08-13 NOTE — PLAN OF CARE
Problem: Falls - Risk of:  Goal: Will remain free from falls  Description: Will remain free from falls  8/13/2020 0526 by Ton Workman RN  Outcome: Ongoing  Note: + High Fall Risk per VILLAGRAN/ABCDS: Explained fall risk precautions to pt and family and rationale behind their use to keep the patient safe. Pt bed is in low position, side rails up, call light and belongings are in reach. Fall wristband applied and present on pts wrist.  Bed alarm on. Pt encouraged to call for assistance. Will continue with hourly rounds for PO intake, pain needs, toileting and repositioning as needed. Problem: Pain:  Goal: Pain level will decrease  Description: Pain level will decrease  8/13/2020 0526 by Ton Workman RN  Outcome: Ongoing  Note: Pt denies pain, will continue to monitor. Problem: Bleeding:  Goal: Will show no signs and symptoms of excessive bleeding  Description: Will show no signs and symptoms of excessive bleeding  8/13/2020 0526 by Ton Workman RN  Outcome: Ongoing  Note: Patient's hemoglobin this AM:   Recent Labs     08/13/20  0414   HGB 8.7*     Patient's platelet count this AM:   Recent Labs     08/13/20  0414       Thrombocytopenia not present at this time. Patient showing no signs or symptoms of active bleeding. Transfusion not indicated at this time. Patient verbalizes understanding of all instructions. Will continue to assess and implement POC. Call light within reach and hourly rounding in place. Problem: PROTECTIVE PRECAUTIONS  Goal: Patient will remain free of nosocomial Infections  8/13/2020 0526 by Ton Workman RN  Outcome: Ongoing  Note: Pt currently in a private, positive pressure room. Educated pt on wearing a mask when neutropenic and/or leaving the floor. No living plants or flowers allowed. Also reinforced importance of hand hygiene. Pt following a low microbial diet. Surfaces throughout room cleaned with bleach wipes per unit policy. Problem: Skin Integrity:  Goal: Will show no infection signs and symptoms  Description: Will show no infection signs and symptoms  8/13/2020 0526 by Freda Yee RN  Outcome: Ongoing  Note: Pt checked for incontinence often and changed as needed to make ensure skin remains clean, dry, and intact. Problem: Venous Thromboembolism:  Goal: Will show no signs or symptoms of venous thromboembolism  Description: Will show no signs or symptoms of venous thromboembolism  8/13/2020 0526 by Freda Yee RN  Outcome: Ongoing  Note: Adherent with DVT Prevention: Pt is at risk for DVT d/t decreased mobility and cancer treatment. Pt educated on importance of activity. Pt has orders for  Heparin . Pt verbalizes understanding of need for prophylaxis while inpatient.

## 2020-08-13 NOTE — BH NOTE
Psychology     Attempted to see pt but she had visitor.   Will try in AM   Carmen Almeida Psy.D., ABPP

## 2020-08-14 VITALS
BODY MASS INDEX: 18.22 KG/M2 | OXYGEN SATURATION: 100 % | HEART RATE: 66 BPM | RESPIRATION RATE: 16 BRPM | SYSTOLIC BLOOD PRESSURE: 116 MMHG | DIASTOLIC BLOOD PRESSURE: 67 MMHG | WEIGHT: 109.35 LBS | TEMPERATURE: 97.5 F | HEIGHT: 65 IN

## 2020-08-14 LAB
ALBUMIN SERPL-MCNC: 3.2 G/DL (ref 3.4–5)
ANION GAP SERPL CALCULATED.3IONS-SCNC: 12 MMOL/L (ref 3–16)
BASOPHILS ABSOLUTE: 0 K/UL (ref 0–0.2)
BASOPHILS RELATIVE PERCENT: 0.2 %
BUN BLDV-MCNC: 33 MG/DL (ref 7–20)
CALCIUM SERPL-MCNC: 9.6 MG/DL (ref 8.3–10.6)
CHLORIDE BLD-SCNC: 111 MMOL/L (ref 99–110)
CO2: 21 MMOL/L (ref 21–32)
CREAT SERPL-MCNC: 1.2 MG/DL (ref 0.6–1.2)
EOSINOPHILS ABSOLUTE: 0 K/UL (ref 0–0.6)
EOSINOPHILS RELATIVE PERCENT: 0 %
GFR AFRICAN AMERICAN: 53
GFR NON-AFRICAN AMERICAN: 44
GLUCOSE BLD-MCNC: 153 MG/DL (ref 70–99)
HCT VFR BLD CALC: 26.2 % (ref 36–48)
HEMOGLOBIN: 8.7 G/DL (ref 12–16)
LYMPHOCYTES ABSOLUTE: 0.1 K/UL (ref 1–5.1)
LYMPHOCYTES RELATIVE PERCENT: 3.7 %
MCH RBC QN AUTO: 29.1 PG (ref 26–34)
MCHC RBC AUTO-ENTMCNC: 33.1 G/DL (ref 31–36)
MCV RBC AUTO: 87.7 FL (ref 80–100)
MONOCYTES ABSOLUTE: 0.1 K/UL (ref 0–1.3)
MONOCYTES RELATIVE PERCENT: 2.2 %
NEUTROPHILS ABSOLUTE: 3.1 K/UL (ref 1.7–7.7)
NEUTROPHILS RELATIVE PERCENT: 93.9 %
PDW BLD-RTO: 19.8 % (ref 12.4–15.4)
PHOSPHORUS: 3.2 MG/DL (ref 2.5–4.9)
PLATELET # BLD: 134 K/UL (ref 135–450)
PMV BLD AUTO: 8.5 FL (ref 5–10.5)
POTASSIUM SERPL-SCNC: 3.6 MMOL/L (ref 3.5–5.1)
RBC # BLD: 2.98 M/UL (ref 4–5.2)
SODIUM BLD-SCNC: 144 MMOL/L (ref 136–145)
WBC # BLD: 3.4 K/UL (ref 4–11)

## 2020-08-14 PROCEDURE — 6360000002 HC RX W HCPCS: Performed by: STUDENT IN AN ORGANIZED HEALTH CARE EDUCATION/TRAINING PROGRAM

## 2020-08-14 PROCEDURE — 6370000000 HC RX 637 (ALT 250 FOR IP): Performed by: STUDENT IN AN ORGANIZED HEALTH CARE EDUCATION/TRAINING PROGRAM

## 2020-08-14 PROCEDURE — 2580000003 HC RX 258: Performed by: STUDENT IN AN ORGANIZED HEALTH CARE EDUCATION/TRAINING PROGRAM

## 2020-08-14 PROCEDURE — 85025 COMPLETE CBC W/AUTO DIFF WBC: CPT

## 2020-08-14 PROCEDURE — 99238 HOSP IP/OBS DSCHRG MGMT 30/<: CPT | Performed by: INTERNAL MEDICINE

## 2020-08-14 PROCEDURE — 6370000000 HC RX 637 (ALT 250 FOR IP): Performed by: INTERNAL MEDICINE

## 2020-08-14 PROCEDURE — 80069 RENAL FUNCTION PANEL: CPT

## 2020-08-14 PROCEDURE — 97530 THERAPEUTIC ACTIVITIES: CPT

## 2020-08-14 PROCEDURE — 97535 SELF CARE MNGMENT TRAINING: CPT

## 2020-08-14 RX ORDER — AMLODIPINE BESYLATE 10 MG/1
10 TABLET ORAL DAILY
Qty: 30 TABLET | Refills: 3 | Status: ON HOLD | OUTPATIENT
Start: 2020-08-15 | End: 2020-09-02 | Stop reason: ALTCHOICE

## 2020-08-14 RX ADMIN — Medication 10 ML: at 08:13

## 2020-08-14 RX ADMIN — LENALIDOMIDE 15 MG: 15 CAPSULE ORAL at 08:13

## 2020-08-14 RX ADMIN — ACYCLOVIR 400 MG: 400 TABLET ORAL at 08:13

## 2020-08-14 RX ADMIN — AMLODIPINE BESYLATE 10 MG: 10 TABLET ORAL at 08:13

## 2020-08-14 RX ADMIN — METOPROLOL TARTRATE 25 MG: 25 TABLET, FILM COATED ORAL at 08:13

## 2020-08-14 RX ADMIN — HEPARIN SODIUM 5000 UNITS: 5000 INJECTION INTRAVENOUS; SUBCUTANEOUS at 06:06

## 2020-08-14 ASSESSMENT — PAIN SCALES - GENERAL
PAINLEVEL_OUTOF10: 0

## 2020-08-14 NOTE — PROGRESS NOTES
4 Eyes Skin Assessment     The patient is being assess for  Shift Handoff    I agree that 2 RN's have performed a thorough Head to Toe Skin Assessment on the patient. ALL assessment sites listed below have been assessed. Areas assessed by both nurses: Jesus Ram RN and Awais Bob RN  [x]   Head, Face, and Ears   [x]   Shoulders, Back, and Chest  [x]   Arms, Elbows, and Hands   [x]   Coccyx, Sacrum, and IschIum  [x]   Legs, Feet, and Heels        Does the Patient have Skin Breakdown?   No         Niall Prevention initiated:  Yes   Wound Care Orders initiated:  NA      [facility] nurse consulted for Pressure Injury (Stage 3,4, Unstageable, DTI, NWPT, and Complex wounds), New and Established Ostomies:  NA      Nurse 1 eSignature: Electronically signed by Ana Goss RN on 8/14/20 at 7:42 AM EDT       Nurse 2 eSignature: Swetha Sepulveda RN

## 2020-08-14 NOTE — DISCHARGE INSTR - ACTIVITY
Reviewed discharge instructions with patient and  {Blank multiple:77036::\"spouse\",\"family members\",\"caregiver\"}. Reviewed discharge medications including dosing, schedule, indication, and adverse reactions. Reviewed which medications were already taken today and next dosage due for each medication. Reviewed signs and symptoms that prompt a call to the physician and appropriate phone numbers. [facility] card given to the patient with explanations of its use. Reviewed follow up appointments that have been made in [facility] and Outpatient Oncology. Low microbial diet, activity restrictions, and increased risk of infection were reviewed. Patient is being discharged with IV access d/t need for ongoing therapy:      Type:  ***                        Date of placement:  ***  Surgeon:  ***  Plan:{CHP CONTINUE, INSERT, REMOVE:42535}   Next dressing change due on: ***  Cap changes due on: ***  CVC care and maintenance was reviewed with patient and {Blank multiple:62820::\"spouse\",\"family members\",\"caregiver\"}. Pt verbalizes understanding of line care and maintenance. Patient verbalized understanding of all instructions and questions were answered to {Blank single:05738::\"his\",\"her\"}. satisfaction. Signed discharge instructions were given to the patient and a copy placed in the paper-lite chart. Patient discharged to {Blank single:32471::\"home\",\"SNF\",\"rehab\",\"hospice\"} per {Blank single:11427::\"wheelchair\",\"stretcher\",\"self\"} with {Blank multiple:63639::\"spouse\",\"family members\",\"caregiver\"}.       [place] Stairs

## 2020-08-14 NOTE — PROGRESS NOTES
artery disease), CAD (coronary artery disease), Diverticulosis, ESRD (end stage renal disease) on dialysis ([facility] [address] GERD (gastroesophageal reflux disease), History of colonoscopy, Hyperlipidemia, Hypertension, and Myocardial infarction, inferior wall ([facility] [address] has a past surgical history that includes  section (x3); Hysterectomy; Tympanoplasty; Breast reduction surgery ([facility]); CT BIOPSY BONE MARROW (2020); and CT NEEDLE BIOPSY LIVER PERCUTANEOUS (2020). Restrictions  Position Activity Restriction  Other position/activity restrictions: up with assist     Additional Pertinent Hx: Patient is a 64 y/o female admitted  with urinary retention. CT abdomen (+) for new liver lesion; biopsy on 2020. PMH significant for HTN, HLD, CAD, MI, ESRD, multiple myeloma. Diagnosis: Pt admitted with urinary retention, dx of WANDER on CKD, hyperkalemia, hyponatremia  Treatment Diagnosis: impaired ADLs and transfers    Subjective:   P met supine in bed and agreeable to OT treatment    Pain:   denies    Objective:    Cognition/Orientation:  Oriented x4    Bed mobility   Supine to sit: Supervision  Scooting: Supervision    Functional Mobility   Sit to Stand: CGA from EOB then SBA from toilet with use of GB. VCs for safe hand placemnt  Stand to Sit: SBA  Bed to Chair Transfer: SBA - CGA with VCS for safe RW use and positioning  Commode Transfer: SBA - CGA with VCs for use of GB. Pt states she will be using a BSC at home d/t steps to reach home bathroom. Other: Functional mobility to and from bathroom with CGA to SBA with short steps and VCs for RW tech. Pt receptive to [address] [place] [address] education. Pt stating \"it might take me a minute to get use to this\"    ADLs   Grooming: SBA for oral care and washing face and hands in stance at sink standing for 5 min   Bathing: Supervision for UE and LE washing seated on toilet usine warm wipes. Pt plans on sponge bathing seated at home.   UB dressing: Independent donning T shirt  LB dressing: CGA with slight assist for threading RLE donning briefs and pants  Toileting: Supervision completing shala care seated    Activity Tolerance:  Pt with good tolerance for all tasks    Patient Education:   Role of OT, safe RW tech, safe home set up, safe transfers/hand placment    Safety Devices in Place:  Pt left in chair with alarm on and call light in place.              Plan  If pt discharges prior to next treatment, this note will serve as discharge summary  Plan  Times per week: 2-5  Times per day: Daily  Current Treatment Recommendations: Balance Training, Functional Mobility Training, Endurance Training, Self-Care / ADL    AM-PAC Score        AM-PAC Inpatient Daily Activity Raw Score: 22 (08/14/20 0928)  AM-PAC Inpatient ADL T-Scale Score : 47.1 (08/14/20 0928)  ADL Inpatient CMS 0-100% Score: 25.8 (08/14/20 0928)  ADL Inpatient CMS G-Code Modifier : Ayla Champion (08/14/20 0686)    Goals (as determined and assessed by primary OT)  Short term goals  Time Frame for Short term goals: discharge  Short term goal 1: pt to transfer to commode with S - ongoing  Short term goal 2: pt to stand for 4 minutes with SBA, while doing functional tasks - goal met 8/14  Short term goal 3: pt to tolerate 10 reps B UE AROM exerc to increase activity tolerance - ongoing  Short term goal 4: pt to don hospital pants vs depend briefs with S - ongoing  Patient Goals   Patient goals : pt wants to go outside       Therapy Time   Individual Concurrent Group Co-treatment   Time In 0855         Time Out 0934         Minutes 39         Timed Code Treatment Minutes: 39 Minutes   Total Treatment minutes: 39 min     [address] [place] [address], Ne, SHEARER/L

## 2020-08-14 NOTE — PROGRESS NOTES
Oncology / Hematology Care      Patient name:   Stevo Blood   Date:     8/13/2020           Interval History:    64 yo patient, well known to our practice      Tolerated first day of ai  No acute issues          Allergies:    No Known Allergies       Medications:      Current Facility-Administered Medications:     acyclovir (ZOVIRAX) tablet 400 mg, 400 mg, Oral, BID, Scarlet Merigold., DO, 400 mg at 08/14/20 0813    lenalidomide (REVLIMID) chemo capsule 15 mg, 15 mg, Oral, Every Other Day, Scarlet Merigold., DO, 15 mg at 08/14/20 0813    amLODIPine (NORVASC) tablet 10 mg, 10 mg, Oral, Daily, Brett Cervantes MD, 10 mg at 08/14/20 0813    heparin (porcine) injection 5,000 Units, 5,000 Units, Subcutaneous, Q8H, Modesta Christianson MD, 5,000 Units at 08/14/20 0606    atorvastatin (LIPITOR) tablet 40 mg, 40 mg, Oral, Nightly, Yasmany Mera MD, 40 mg at 08/13/20 2235    metoprolol tartrate (LOPRESSOR) tablet 25 mg, 25 mg, Oral, BID, Yasmany Mera MD, 25 mg at 08/14/20 0813    sodium chloride flush 0.9 % injection 10 mL, 10 mL, Intravenous, 2 times per day, Yasmany Mera MD, 10 mL at 08/14/20 0813    sodium chloride flush 0.9 % injection 10 mL, 10 mL, Intravenous, PRN, Yasmany Mera MD    acetaminophen (TYLENOL) tablet 650 mg, 650 mg, Oral, Q6H PRN, 650 mg at 08/09/20 0348 **OR** acetaminophen (TYLENOL) suppository 650 mg, 650 mg, Rectal, Q6H PRN, Elena Pond MD    promethazine (PHENERGAN) tablet 12.5 mg, 12.5 mg, Oral, Q6H PRN **OR** ondansetron (ZOFRAN) injection 4 mg, 4 mg, Intravenous, Q6H PRN, Yasmany Mera MD, 4 mg at 08/12/20 4317    glucose (GLUTOSE) 40 % oral gel 15 g, 15 g, Oral, PRN, Yasmany Mera MD    dextrose 50 % IV solution, 12.5 g, Intravenous, PRN, Elena Pond MD    glucagon (rDNA) injection 1 mg, 1 mg, Intramuscular, PRN, Elena Pond MD    dextrose 5 % solution, 100 mL/hr, Intravenous, PRN, Yasmany Mera MD        Review of Systems:    · History obtained from patient, otherwise, further 10 point ROS is negative        Physical Exam:    /63   Pulse 65   Temp 98.5 °F (36.9 °C) (Oral)   Resp 18   Ht 5' 5\" (1.651 m)   Wt 109 lb 5.6 oz (49.6 kg)   SpO2 95%   BMI 18.20 kg/m²     General appearance: alert and cooperative; no acute distress  Head: NCAT, PERRLA, EOMI; oral and nasopharynx without lesions, dentition adequate repair  Neck: no JVD or thyromegaly  Lungs: clear to auscultation bilaterally; no audible rhonchi, rales, wheezes or crackles  Heart: regular rate and rhythm, S1, S2 normal; no murmurs, rubs or gallops  Abdomen: soft, non-tender and non-distended; normoactive, tympanic bowel sounds; no hepatosplenomegaly  Extremities: no cyanosis, clubbing, edema or asymmetry  Skin: no jaundice, purpura or petechiae  Lymph Nodes:  no auricular, cervical, supraclavicular, axillary, inguinal or popliteal lymphadenopathy  Neurologic:  CN 2-12 intact; no focal motor, sensory or cerebellar deficits        Laboratory Evaluation:      CBC:   Lab Results   Component Value Date    WBC 3.4 08/14/2020    NEUTROABS 3.1 08/14/2020    HGB 8.7 08/14/2020    MCV 87.7 08/14/2020     08/14/2020       BMP:   Lab Results   Component Value Date     08/14/2020    K 3.6 08/14/2020    K 3.8 08/09/2020    CO2 21 08/14/2020    BUN 33 08/14/2020    CREATININE 1.2 08/14/2020    MG 1.80 08/08/2020    TSH 0.93 03/30/2020       HEPATIC:  Lab Results   Component Value Date    AST 15 08/12/2020    ALT 7 08/12/2020    ALKPHOS 89 08/12/2020    PROT 7.6 08/12/2020    PROT 7.5 04/09/2012    BILITOT 0.3 08/12/2020    BILIDIR <0.2 08/12/2020     03/31/2020           Radiology Evaluation:    CT A/P:    Development of hypodense lesion within liver likely relates to primary or metastatic neoplasm reduced since 2017 examination.   .         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  Clinical correlation suggested.         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 Vascular consult may be useful if indicated.         Marked colonic diverticulosis without diverticulitis.         No hydronephrosis         No change in left adrenal nodule         Marked lytic lesions noted consistent with patient's myeloma involving the bilateral ribs. Lower thoracic spine, lumbar spine, pelvis, sacrum and hips and proximal femurs with lytic lesion involving the left iliac bone with pathologic fracture with gap    involving the left iliac bone present previously. Assessment / Plan:      Multiple Myeloma    - Relapsed/Refractory, CyBorD    Hypercalcemia due to MM  Acute on chronic kidney failure - Cr now 1.1      - day #2 of DLd    - Ca improving        As always, thank you for allowing me the opportunity to participate in the care of your patient. Please do not hesitate to contact me with questions or concerns regarding further management. Luca Tinsley DO, MS  Oncology/Hematology Care    Please contact via:  1. [employer]  2.   Cell Phone:  [phone]    8/14/2020   10:16 AM

## 2020-08-14 NOTE — PROGRESS NOTES
NUTRITION ASSESSMENT  Admission Date: 8/5/2020     Type and Reason for Visit: Reassess    NUTRITION RECOMMENDATIONS:   1. PO Diet: Recommend modify to general low potassium to aid in po intakes   2. ONS: Start Nepro BID    3. Encourage po intakes     NUTRITION ASSESSMENT:  Subjective:  Nutritionally compromised d/t     MALNUTRITION ASSESSMENT  Context of Malnutrition: Acute Illness   Malnutrition Status: At risk for malnutrition (Comment) (suspect mod/severe malnutrition)  Findings of the 6 clinical characteristics of malnutrition (Minimum of 2 out of 6 clinical characteristics is required to make the diagnosis of moderate or severe Protein Calorie Malnutrition based on AND/ASPEN Guidelines):  Energy Intake: Less than/equal to 50% of estimated energy requirements    Energy Intake Time: x1 day   Weight Loss %: 10% loss or greater    Weight loss Time: Greater than or equal to 3 months   Body Fat Loss: Unable to Assess   Body Fat Location: Unable to assess    Body Muscle Loss: Unable to Assess   Body Muscle Loss Location: Unable to assess    Fluid Accumulation: No significant    Fluid Accumulation Location: No significant     Strength: Not Performed;  Not Measured     NUTRITION DIAGNOSIS   Problem: Problem #1: Underweight  Etiology: Insufficient energy/nutrient consumption  Signs & Symptoms: Intake 0-25% and Weight loss     NUTRITION INTERVENTION  Food and/or Nutrient Delivery:Continue NPO  Nutrition education/counseling/coordination of care: Continue Inpatient Monitoring     NUTRITION MONITORING & EVALUATION:  Evaluation:Progressing towards goal   Goals:Goals: Pt will consume >/=50% of meals and ONS this admission   Monitoring: Meal Intake , Pertinent Labs , Supplement Intake  or Weight      OBJECTIVE DATA:  · Nutrition-Focused Physical Findings: no edema;   · Wounds None      Past Medical History:   Diagnosis Date    Allergic rhinitis     Anemia     CAD (coronary artery disease)     CAD (coronary artery disease)     Diverticulosis     ESRD (end stage renal disease) on dialysis ([facility] [address] 4/24/2020    GERD (gastroesophageal reflux disease)     History of colonoscopy 2002    Hyperlipidemia     Hypertension     Myocardial infarction, inferior wall [facility]) January 2006        ANTHROPOMETRICS  Current Height: 5' 5\" (165.1 cm)  Current Weight: 109 lb 5.6 oz (49.6 kg)    Admission weight: 110 lb 3.7 oz (50 kg)  Ideal Bodyweight 125 lb    Usual Bodyweight ~130 lb    Weight Changes  -16%, 21 lb x 3 months       BMI BMI (Calculated): 18.2    Wt Readings from Last 50 Encounters:   08/06/20 110 lb 3.7 oz (50 kg)   07/28/20 114 lb 9.6 oz (52 kg)   06/03/20 129 lb (58.5 kg)   05/26/20 131 lb 6.4 oz (59.6 kg)   04/07/20 120 lb 5.9 oz (54.6 kg)   03/30/20 127 lb (57.6 kg)     COMPARATIVE STANDARDS  Estimated Total Kcals/Day : 30-35 Current Bodyweight (50 kg) 4228-8358 kcal    Estimated Total Protein (g/day) : 1.5-2.0 Current Bodyweight (50 kg)  g/day  Estimated Daily Total Fluid (ml/day): 1 mL/kcal per day     Food / Nutrition-Related History  Pre-Admission / Home Diet:  Pre-Admission/Home Diet: General   Home Supplements / Herbals:    none noted  Food Restrictions / Cultural Requests:    none noted    Diet Orders / Intake / Nutrition Support  Current diet/supplement order: No diet orders on file     NSG Recorded PO:   PO Fluids P.O.: 100 mL(coffee, water)  PO Meals PO Meals Eaten (%): 0   PO Intake: 1-25% and 26-50%  PO Supplement: Renal Oral Supplement    PO Supplement Intake: 26-50% and 51-75%  IVF: none     NUTRITION RISK LEVEL: Risk Level: High     Cynthia Ayala RD, LD  [place]:  [phone]  Office:  [phone]

## 2020-08-14 NOTE — PLAN OF CARE
Problem: Falls - Risk of:  Goal: Will remain free from falls  Description: Will remain free from falls  8/14/2020 0443 by Yuri Hayes RN  Outcome: Ongoing   Pt is a High fall risk. See Lary Quirk Fall Score and ABCDS Injury Risk assessments.   + Screening for Orthostasis and/or + High Fall Risk per VILLAGRAN/ABCDS: Explained fall risk precautions to pt and family and rationale behind their use to keep the patient safe. Pt bed is in low position, side rails up, call light and belongings are in reach. Fall wristband applied and present on pts wrist.  Bed alarm on. Pt encouraged to call for assistance. Will continue with hourly rounds for PO intake, pain needs, toileting and repositioning as needed. Problem: Pain:  Goal: Pain level will decrease  Description: Pain level will decrease  8/14/2020 0443 by Yuri Hayes RN  Outcome: Ongoing   Pt. Demonstrates correct use of 0-10 pain scale; pt verbalizes understanding to notify RN staff of any changes in pain medication, new onset pain, and need for pain medication; pt denies need for pain medication from this RN; call light in reach; will continue to monitor. Problem: Bleeding:  Goal: Will show no signs and symptoms of excessive bleeding  Description: Will show no signs and symptoms of excessive bleeding  8/14/2020 0443 by Yuri Hayes RN  Outcome: Ongoing     Patient's hemoglobin this AM:   Recent Labs     08/14/20  0550   HGB 8.7*     Patient's platelet count this AM:   Recent Labs     08/14/20  0550   *    Thrombocytopenia not present at this time. Patient showing no signs or symptoms of active bleeding. Transfusion not indicated at this time. Patient verbalizes understanding of all instructions. Will continue to assess and implement POC. Call light within reach and hourly rounding in place.      Problem: PROTECTIVE PRECAUTIONS  Goal: Patient will remain free of nosocomial Infections  8/14/2020 0443 by Yuri Hayes RN  Outcome: Ongoing   Pt afebrile this shift; pt shows no clinical s/s of infection at this time- see flowsheets; pt and staff following appropriate hand hygiene and PPE protocols per isolation precautions in place. Will continue to monitor. Problem: Skin Integrity:  Goal: Will show no infection signs and symptoms  Description: Will show no infection signs and symptoms  Outcome: Ongoing   Pt shows no new skin breakdown this shift; will continue to monitor. Problem: Venous Thromboembolism:  Goal: Will show no signs or symptoms of venous thromboembolism  Description: Will show no signs or symptoms of venous thromboembolism  8/14/2020 0443 by Teresa Romero RN  Outcome: Ongoing   Adherent with DVT Prevention: Pt is at risk for DVT d/t decreased mobility and cancer treatment. Pt educated on importance of activity. Pt has orders for  heparing subcut . Pt verbalizes understanding of need for prophylaxis while inpatient.

## 2020-08-14 NOTE — PLAN OF CARE
Problem: Falls - Risk of:  Goal: Will remain free from falls  Description: Will remain free from falls  Outcome: Ongoing  Note:  Pt is a High fall risk. See Kenton Birch Fall Score and ABCDS Injury Risk assessments.   + Screening for Orthostasis and/or + High Fall Risk per VILLGARAN/ABCDS: Explained fall risk precautions to pt and family and rationale behind their use to keep the patient safe. Pt bed is in low position, side rails up, call light and belongings are in reach. Fall wristband applied and present on pts wrist.  Bed alarm on. Pt encouraged to call for assistance. Will continue with hourly rounds for PO intake, pain needs, toileting and repositioning as needed. Problem: Pain:  Goal: Pain level will decrease  Description: Pain level will decrease  Outcome: Ongoing  Note: Pt has had no complaints of pain. Problem: Bleeding:  Goal: Will show no signs and symptoms of excessive bleeding  Description: Will show no signs and symptoms of excessive bleeding  Outcome: Ongoing  Note: Patient's hemoglobin this AM:   Recent Labs     08/13/20  0414   HGB 8.7*     Patient's platelet count this AM:   Recent Labs     08/13/20  0414       Thrombocytopenia Precautions in place. Patient showing no signs or symptoms of active bleeding. Transfusion not indicated at this time. Patient verbalizes understanding of all instructions. Will continue to assess and implement POC. Call light within reach and hourly rounding in place. Problem: PROTECTIVE PRECAUTIONS  Goal: Patient will remain free of nosocomial Infections  Outcome: Ongoing  Note: Pt remains in protective precautions. Pt educated on wearing mask when in hallways. Pt, staff, and visitors adhering to handwashing guidelines. Pt educated to shower or bathe daily with chlorhexidine and linens changed daily per protocol. Pt verbalizes understanding of low microbial diet. Will continue to monitor.        Problem: Discharge Planning:  Goal: Discharged to appropriate level of care  Description: Discharged to appropriate level of care  Outcome: Ongoing  Note: Pt is up to date on plan of care. Problem: Venous Thromboembolism:  Goal: Will show no signs or symptoms of venous thromboembolism  Description: Will show no signs or symptoms of venous thromboembolism  Outcome: Ongoing  Note: Adherent with DVT Prevention: Pt is at risk for DVT d/t decreased mobility and cancer treatment. Pt educated on importance of activity. Pt has orders for Subcut prophylactic lovenox. Pt verbalizes understanding of need for prophylaxis while inpatient.

## 2020-08-14 NOTE — PROGRESS NOTES
Oncology / Hematology Care      Patient name:   Saritha Horner   Date:     8/13/2020           Interval History:    No acute issues overnight    Sitting in chair, smiling          Allergies:    No Known Allergies       Medications:      Current Facility-Administered Medications:     acyclovir (ZOVIRAX) tablet 400 mg, 400 mg, Oral, BID, Mariia Au., DO, 400 mg at 08/14/20 0813    lenalidomide (REVLIMID) chemo capsule 15 mg, 15 mg, Oral, Every Other Day, Mariia Au., DO, 15 mg at 08/14/20 0813    amLODIPine (NORVASC) tablet 10 mg, 10 mg, Oral, Daily, Harriet Jimenez MD, 10 mg at 08/14/20 0813    heparin (porcine) injection 5,000 Units, 5,000 Units, Subcutaneous, Q8H, Kerri Gallardo MD, 5,000 Units at 08/14/20 0606    atorvastatin (LIPITOR) tablet 40 mg, 40 mg, Oral, Nightly, Elena Reddy MD, 40 mg at 08/13/20 2235    metoprolol tartrate (LOPRESSOR) tablet 25 mg, 25 mg, Oral, BID, Melva Frausto MD, 25 mg at 08/14/20 0813    sodium chloride flush 0.9 % injection 10 mL, 10 mL, Intravenous, 2 times per day, Melva Frausto MD, 10 mL at 08/14/20 0813    sodium chloride flush 0.9 % injection 10 mL, 10 mL, Intravenous, PRN, Melva Frausto MD    acetaminophen (TYLENOL) tablet 650 mg, 650 mg, Oral, Q6H PRN, 650 mg at 08/09/20 0348 **OR** acetaminophen (TYLENOL) suppository 650 mg, 650 mg, Rectal, Q6H PRN, Elena Pond MD    promethazine (PHENERGAN) tablet 12.5 mg, 12.5 mg, Oral, Q6H PRN **OR** ondansetron (ZOFRAN) injection 4 mg, 4 mg, Intravenous, Q6H PRN, Elena Pond MD, 4 mg at 08/12/20 1737    glucose (GLUTOSE) 40 % oral gel 15 g, 15 g, Oral, PRN, Elena Pond MD    dextrose 50 % IV solution, 12.5 g, Intravenous, PRN, Elena Pond MD    glucagon (rDNA) injection 1 mg, 1 mg, Intramuscular, PRN, Elena Pond MD    dextrose 5 % solution, 100 mL/hr, Intravenous, PRN, Melva Frausto MD        Review of Systems:    · History obtained from patient, otherwise, further 10 point ROS is negative        Physical Exam:    /63   Pulse 65   Temp 98.5 °F (36.9 °C) (Oral)   Resp 18   Ht 5' 5\" (1.651 m)   Wt 109 lb 5.6 oz (49.6 kg)   SpO2 95%   BMI 18.20 kg/m²     General appearance: alert and cooperative; no acute distress  Head: NCAT, PERRLA, EOMI; oral and nasopharynx without lesions, dentition adequate repair  Neck: no JVD or thyromegaly  Lungs: clear to auscultation bilaterally; no audible rhonchi, rales, wheezes or crackles  Heart: regular rate and rhythm, S1, S2 normal; no murmurs, rubs or gallops  Abdomen: soft, non-tender and non-distended; normoactive, tympanic bowel sounds; no hepatosplenomegaly  Extremities: no cyanosis, clubbing, edema or asymmetry  Skin: no jaundice, purpura or petechiae  Lymph Nodes:  no auricular, cervical, supraclavicular, axillary, inguinal or popliteal lymphadenopathy  Neurologic:  CN 2-12 intact; no focal motor, sensory or cerebellar deficits        Laboratory Evaluation:      CBC:   Lab Results   Component Value Date    WBC 3.4 08/14/2020    NEUTROABS 3.1 08/14/2020    HGB 8.7 08/14/2020    MCV 87.7 08/14/2020     08/14/2020       BMP:   Lab Results   Component Value Date     08/14/2020    K 3.6 08/14/2020    K 3.8 08/09/2020    CO2 21 08/14/2020    BUN 33 08/14/2020    CREATININE 1.2 08/14/2020    MG 1.80 08/08/2020    TSH 0.93 03/30/2020       HEPATIC:  Lab Results   Component Value Date    AST 15 08/12/2020    ALT 7 08/12/2020    ALKPHOS 89 08/12/2020    PROT 7.6 08/12/2020    PROT 7.5 04/09/2012    BILITOT 0.3 08/12/2020    BILIDIR <0.2 08/12/2020     03/31/2020           Radiology Evaluation:    CT A/P:    Development of hypodense lesion within liver likely relates to primary or metastatic neoplasm reduced since 2017 examination.   .         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  Clinical correlation suggested.         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 Vascular consult may be useful if indicated.         Marked colonic diverticulosis without diverticulitis.         No hydronephrosis         No change in left adrenal nodule         Marked lytic lesions noted consistent with patient's myeloma involving the bilateral ribs. Lower thoracic spine, lumbar spine, pelvis, sacrum and hips and proximal femurs with lytic lesion involving the left iliac bone with pathologic fracture with gap    involving the left iliac bone present previously. Assessment / Plan:      Multiple Myeloma    - Relapsed/Refractory, CyBorD    Hypercalcemia due to MM  Acute on chronic kidney failure - Cr now 1.1      - completed first doses of DLd  - Ca 9.4  - f/up 8/20 for day #8    - stable from discharge from heme point of view        As always, thank you for allowing me the opportunity to participate in the care of your patient. Please do not hesitate to contact me with questions or concerns regarding further management. Luca Garcia DO, MS  Oncology/Hematology Care    Please contact via:  1. [employer]  2.   Cell Phone:  [phone]    8/14/2020   10:19 AM

## 2020-08-14 NOTE — DISCHARGE INSTR - COC
Continuity of Care Form    Patient Name: Abraham Hannon   :  1950  MRN:  [id number]    [facility] date:  2020  Discharge date:  2020    Code Status Order: Full Code   Advance Directives:   Advance Care Flowsheet Documentation     Date/Time Healthcare Directive Type of Healthcare Directive Copy in [address] Agent's Name Healthcare Agent's Phone Number    [phone]  No, patient does not have an advance directive for healthcare treatment -- -- -- -- --          Admitting Physician:  Mary Ellen Villegas MD  PCP: Mary Ellen Villegas MD    Discharging Nurse: [facility] Unit/Room#: 2686/4968-01  Discharging Unit Phone Number: [phone]    Emergency Contact:   Extended Emergency Contact Information  Primary Emergency Contact: Shanthi Omid Galarza of [address] [place] [address] Phone: [phone]  Relation: Spouse  Secondary Emergency Contact: Ishmael Paul of [address] [place] [address] Phone: [phone]  Relation: Child    Past Surgical History:  Past Surgical History:   Procedure Laterality Date    BREAST REDUCTION SURGERY  Bilat     SECTION  x3    CT BONE MARROW BIOPSY  2020    CT BONE MARROW BIOPSY 2020 [address] SCAN    CT NEEDLE BIOPSY LIVER PERCUTANEOUS  2020    CT NEEDLE BIOPSY LIVER PERCUTANEOUS 2020 [address] SCAN    HYSTERECTOMY      TYMPANOPLASTY         Immunization History:   Immunization History   Administered Date(s) Administered    Influenza, High Dose (Fluzone 65 yrs and older) 2013, 2016, 2016, 2017, 10/30/2018    Influenza, Triv, inactivated, subunit, adjuvanted, IM (Fluad 65 yrs and older) 10/24/2019    Pneumococcal Conjugate 13-valent (Cmnrafq60) 2017    Pneumococcal Polysaccharide (Awnomdncj03) 2014    Tdap (Boostrix, Adacel) 2011       Active Problems:  Patient Active Problem List   Diagnosis Code    HTN (hypertension) I10    CAD (coronary artery disease) I25.10    Hyperlipidemia E78.5    Cerebral infarction (HCC) I63.9    GERD (gastroesophageal reflux disease) K21.9    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    WANDER (acute kidney injury) (HCC) N17.9    Porcelain gallbladder K82.8    Diverticulitis of large intestine without perforation or abscess with bleeding K57.33    Iron deficiency anemia D50.9    Acute renal failure (HCC) N17.9    Multiple myeloma not having achieved remission (HCC) C90.00    ESRD (end stage renal disease) on dialysis (HCC) N18.6, Z99.2    Acute kidney injury superimposed on chronic kidney disease (HCC) N17.9, N18.9       Isolation/Infection:   Isolation          No Isolation        Patient Infection Status     Infection Onset Added Last Indicated Last Indicated By Review Planned Expiration Resolved Resolved By    None active    Resolved    C-diff Rule Out 04/06/20 04/06/20 04/06/20 Clostridium difficile toxin/antigen (Ordered)   04/07/20 Jacinto Mathews, RN          Nurse Assessment:  Last Vital Signs: /63   Pulse 65   Temp 98.5 °F (36.9 °C) (Oral)   Resp 18   Ht 5' 5\" (1.651 m)   Wt 109 lb 5.6 oz (49.6 kg)   SpO2 95%   BMI 18.20 kg/m²     Last documented pain score (0-10 scale): Pain Level: 0  Last Weight:   Wt Readings from Last 1 Encounters:   08/14/20 109 lb 5.6 oz (49.6 kg)     Mental Status:  oriented    IV Access:  - None    Nursing Mobility/ADLs:  Walking   Assisted  Transfer  Assisted  Bathing  Assisted  Dressing  Assisted  Toileting  Assisted  Feeding  Assisted  Med Admin  Assisted  Med Delivery   whole    Wound Care Documentation and Therapy:        Elimination:  Continence:   · Bowel: Yes  · Bladder: No  Urinary Catheter: None   Colostomy/Ileostomy/Ileal Conduit: No       Date of Last BM: 08/13/2020    Intake/Output Summary (Last 24 hours) at 8/14/2020 0823  Last data filed at 8/14/2020 0612  Gross per 24 hour   Intake 1020 ml   Output 300 ml   Net 720 ml     I/O last 3 completed shifts:   In: 56 [P.O.:270;

## 2020-08-14 NOTE — PROGRESS NOTES
4 Eyes Admission Assessment     I agree as the admission nurse that 2 RN's have performed a thorough Head to Toe Skin Assessment on the patient. ALL assessment sites listed below have been assessed on admission. Areas assessed by both nurses:   [x]   Head, Face, and Ears   [x]   Shoulders, Back, and Chest  [x]   Arms, Elbows, and Hands   [x]   Coccyx, Sacrum, and Ischum  [x]   Legs, Feet, and Heels        Does the Patient have Skin Breakdown?   No         Niall Prevention initiated:  Yes   Wound Care Orders initiated:  NA      [facility] nurse consulted for Pressure Injury (Stage 3,4, Unstageable, DTI, NWPT, and Complex wounds) or Niall score 18 or lower:  No      Nurse 1 eSignature: Electronically signed by Ari Lyn RN on 8/14/20 at 3:09 AM EDT    **SHARE this note so that the co-signing nurse is able to place an eSignature**    Nurse 2 eSignature: Electronically signed by Lorena Calvillo RN on 8/14/20 at 7:42 AM EDT

## 2020-08-14 NOTE — PLAN OF CARE
Problem: Falls - Risk of:  Goal: Will remain free from falls  Description: Will remain free from falls  8/14/2020 1200 by Juany Palacios RN  Outcome: Completed     High fall risk, laine active. Call bell w/in reach. Problem: Falls - Risk of:  Goal: Absence of physical injury  Description: Absence of physical injury  8/14/2020 1200 by Juany Palacios RN  Outcome: Completed      Problem: Nutrition  Goal: Optimal nutrition therapy  8/14/2020 1200 by Juany Palacios RN  Outcome: Completed      Problem: Pain:  Goal: Pain level will decrease  Description: Pain level will decrease  8/14/2020 1200 by Juany Palacios RN  Outcome: Completed    No c/o pain. Problem: Physical Regulation:  Goal: Will remain free from infection  Description: Will remain free from infection  8/14/2020 1200 by Juany Palacios RN  Outcome: Completed    No s/sx of infection noted. Problem: Bleeding:  Goal: Will show no signs and symptoms of excessive bleeding  Description: Will show no signs and symptoms of excessive bleeding  8/14/2020 1200 by Juany Palacios RN  Outcome: Completed       Patient's hemoglobin this AM:   Recent Labs     08/14/20  0550   HGB 8.7*     Patient's platelet count this AM:   Recent Labs     08/14/20  0550   *    Thrombocytopenia Precautions in place. Patient showing no signs or symptoms of active bleeding. Transfusion not indicated at this time. Patient verbalizes understanding of all instructions. Will continue to assess and implement POC. Call light within reach and hourly rounding in place. Problem: Discharge Planning:  Goal: Discharged to appropriate level of care  Description: Discharged to appropriate level of care  8/14/2020 1200 by Juany Palacios RN  Outcome: Completed    Dc home today.

## 2020-08-14 NOTE — CONSULTS
[address]               [address]                                  CONSULTATION    PATIENT NAME: Purnima Mims                    :        1950  MED REC NO:   [id number]                          ROOM:       9930  ACCOUNT NO:   [de-identified]                           ADMIT DATE: 2020  PROVIDER:     Clint Capone MD    CONSULT DATE:  2020    PSYCHIATRIC CONSULTATION    REASON FOR EVALUATION:  Depression and decreased appetite. HISTORY OF PRESENT ILLNESS:  A 22-year-old female with a history of  multiple myeloma, who apparently was found to have new lytic lesions on  CT scan that has prompted a change in her overall management. The  patient is undergoing a chemotherapy trial at this point. She was  hospitalized about a week ago and scheduled for discharge tomorrow. The  patient has been noted to be feeling depressed in response to these  changes. She is also homesick, having been in the hospital for little  over a week. She has had a loss of appetite with nausea and vomiting at  times and this apparently goes back at least a few months if not longer  off and on in conjunction with her medical issues. The patient has a  history of a porcelain gallbladder, which is felt to be a factor in her  nausea. She also has chronic liver lesions. It is unclear how much  weight loss the patient has experienced over the past several months. The patient is currently a difficult historian that she is quite tired  in appearance perhaps due to getting agents pre-chemo at this time. However, she feels like she has been depressed for about three weeks in  total.  She feels however, she would have better spirit at home and is  scheduled for discharge tomorrow. Difficult to sort out depression  symptoms from everything else that she is experiencing.   She has not  made any sort of suicidal comments and seems that still be in best in  treatment. At one point recently, the patient needed hemodialysis but  her kidney function seems to have recovered. PAST PSYCHIATRIC HISTORY:  The patient does not recall seeing a  psychiatrist on a regular basis in the past.  No evidence of that in her  chart. Dr. Will Edwards attempted to see the patient, but was not able to due  to visitors. No known history of substance abuse. MENTAL STATUS EXAM:  The patient is lying in bed and seen using video  technology. She appears quite tired and her speech is somewhat slurred. Limited eye contact. She endorses feeling depressed. Her affect is  very restricted. She appears to have fair insight, unknown judgment. No homicidal or suicidal statement and no psychosis of any kind. Oriented to the day of the week and the date. Aware of possible  discharge tomorrow. No loosening of associations, but limited  conversation ability at this time. 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 TREATMENT RECOMMENDATIONS:  1. At this point, I recommend conservative management and observation,  and see how she responds to going back home tomorrow. Consider a trial  of mirtazapine if it has not been tried in the past, she does not recall  the name. Low doses can reduce nausea and stimulate appetite as well as  Function as an antidepressant. However, would need to monitor for increase  sedation if this is an ongoing issue. 2.  The patient is not oppose to trying any antidepressant, but would  probably make more sense to wait at this point unless family clearly  states that they have been concerned about ongoing depressive signs and  symptoms for longer than three weeks. 3.  She may also benefit from outpatient individual therapy given the  enormous stress secondary to her medical issues. Please do not hesitate  to recontact if needed.         Sergei Gonzales MD    D: 08/13/2020 17:50:23       T: 08/13/2020 19:26:57     [email or web address]  Job#: [id number] Doc#: [id number]    CC:

## 2020-08-14 NOTE — CARE COORDINATION
Case Management Assessment           Daily Note                 Date/ Time of Note: 8/10/2020 10:44 AM         Note completed by: Kelsey Eldridge    Patient Name: Sergei Landry  YOB: 1950    Diagnosis:Acute kidney injury superimposed on chronic kidney disease ([facility] [N17.9, N18.9]  Acute kidney injury superimposed on chronic kidney disease ([facility] [N17.9, N18.9]  Patient Admission Status: Inpatient    Date of [address] PM Length of Stay: 4 GLOS:      Current Plan of Care: home with no needs  ________________________________________________________________________________________  PT AM-PAC:   / 24 per last evaluation on: none    OT AM-PAC:   / 24 per last evaluation on: none    DME Needs for discharge: none  ________________________________________________________________________________________  Discharge Plan: Home    Tentative discharge date: tbd    Current barriers to discharge: liver bx today    Referrals completed: Not Applicable    Resources/ information provided: Not indicated at this time  ________________________________________________________________________________________  Case Management Notes:Patient is getting a liver bx today. She is currently denying any needs at discharge. SW continues to follow for potential discharge planning needs. Nikoleannita Perkinsg and her family were provided with choice of provider; she and her family are in agreement with the discharge plan.     Care Transition Patient: Yes    Kelsey Eldridge [facility]  Case Management Department  Ph: [phone]  Fax: [phone]
Case Management Assessment           Daily Note                 Date/ Time of Note: 8/11/2020 3:20 PM         Note completed by: Sonia Hicks    Patient Name: Regla Turner  YOB: 1950    Diagnosis:Acute kidney injury superimposed on chronic kidney disease ([facility] [N17.9, N18.9]  Acute kidney injury superimposed on chronic kidney disease ([facility] [N17.9, N18.9]  Patient Admission Status: Inpatient    Date of Admission:8/5/2020  6:42 PM Length of Stay: 5 GLOS:      Current Plan of Care: home  ________________________________________________________________________________________  PT AM-PAC:   / 24 per last evaluation on: tbd    OT AM-PAC:   / 24 per last evaluation on: tbd    DME Needs for discharge: tbd  ________________________________________________________________________________________  Discharge Plan: Home    Tentative discharge date: tbd    Current barriers to discharge: medical clearance    Referrals completed: Not Applicable    Resources/ information provided: Not indicated at this time  ________________________________________________________________________________________  Case Management Notes: Met with patient and spouse at bedside. Patient's spouse has some concerns about whether the patient will require DME at discharge. Also had questions about the possible need for home care. SW [facility] resident requesting PT/OT orders to assess for needs at discharge. JOSEPH continues to follow for potential discharge planning needs. Chava Hernández and her family were provided with choice of provider; she and her family are in agreement with the discharge plan.     Care Transition Patient: [facility]  Case Management Department  Ph: [phone]  Fax: [phone]
Case Management Assessment           Daily Note                 Date/ Time of Note: 8/12/2020 4:01 PM         Note completed by: Mac Horowitz    Patient Name: Sherwin Valerio  YOB: 1950    Diagnosis:Acute kidney injury superimposed on chronic kidney disease ([facility] [facility] [N17.9, N18.9]  Acute kidney injury superimposed on chronic kidney disease ([facility] [N17.9, N18.9]  Patient Admission Status: Inpatient    Date of [address] PM Length of Stay: 6 GLOS:      Current Plan of Care: home with home care?  ________________________________________________________________________________________  PT AM-PAC: 18 / 24 per last evaluation on: 8/12/2020    OT AM-PAC: 21 / 24 per last evaluation on: 8/12/2020    DME Needs for discharge: none  ________________________________________________________________________________________  Discharge Plan: Home with [address] [place] [address] Way: tbd    Tentative discharge date: tbd    Current barriers to discharge: medical clearance    Referrals completed: Not Applicable    Resources/ information provided: Not indicated at this time  ________________________________________________________________________________________  Case Management Notes: Met with patient and son at bedside. Patient's son had a question about a doctor's excuse for missing work today. Son informed that he is approved for [facility] through employer, so SW informed that if he was approved for [facility] he should not need further physician notes. Patient being recommended home PT/OT. Will discuss with patient what her agency preference is tomorrow. Yonis Subramanian and her family were provided with choice of provider; she and her family are in agreement with the discharge plan.     Care Transition Patient: Yes    [facility]  Case Management Department  Ph: [phone]  Fax: [phone]
Case Management Assessment           Daily Note                 Date/ Time of Note: 8/13/2020 9:47 AM         Note completed by: Deborah Hernandez    Patient Name: Laila Mayo  YOB: 1950    Diagnosis:Acute kidney injury superimposed on chronic kidney disease ([address] [N17.9, N18.9]  Acute kidney injury superimposed on chronic kidney disease ([facility] [N17.9, N18.9]  Patient Admission Status: Inpatient    Date of [address] PM Length of Stay: 7 GLOS:      Current Plan of Care: home with home care  ________________________________________________________________________________________  PT AM-PAC: 18 / 24 per last evaluation on: 8/13/2020    OT AM-PAC: 21 / 24 per last evaluation on: 8/13/2020    DME Needs for discharge: none  ________________________________________________________________________________________  Discharge Plan: Home with [address]: tbd    Tentative discharge date: 8/14/2020    Current barriers to discharge: completion of ai    Referrals completed: Not Applicable    Resources/ information provided: [address] List  ________________________________________________________________________________________  Case Management Notes: Patient's ai had to be held and slowed down yesterday. She completed her first cycle early this morning. Patient's second cycle will likely run the same way. Hopeful to discharge on 8/14 after ai is completed. Analy Hedrick and her family were provided with choice of provider; she and her family are in agreement with the discharge plan.     Care Transition Patient: Yes    Deborah Hernandez [facility]  Case Management Department  Ph: [phone]  Fax: [phone]
Yes    ADLS:  Current PT AM-PAC Score: 18 /24  Current OT AM-PAC Score: 21 /24      DISCHARGE Disposition: Home with Home Health Care: [address]      LOC at discharge: Not Applicable  MAGNO Completed: Yes    Notification completed in HENS/PAS?:  Not Applicable    IMM Completed:   Yes, Case management has presented and reviewed IMM letter #2 to the patient and/or family/ POA. Patient and/or family/POA verbalized understanding of their medicare rights and appeal process if needed. Patient and/or family/POA has signed, initialed and placed today's date (8/14/2020) and time ([id number]) on IMM letter #2 on the the appropriate lines. Patient and/or family/POA, copy of letter offered and they are aware that this original copy of IMM letter #2 is available prior to discharge from the paper chart on the unit. Electronic documentation has been entered into epic for IMM letter #2 and original paper copy has been added to the paper chart at the nurses station. Transportation:  Transportation PLAN for discharge: family   Mode of Transport: Private Car  Reason for medical transport: Not Applicable  Name of [address] [place] [address], [address]: Not Applicable  Time of Transport: afternoon    Transport form completed: No    Home Care:  [address] ordered at discharge: Yes   [address]: [address]   Phone: [phone]  Fax: [phone]  Orders faxed: Yes    Durable Medical Equipment:  DME Provider: none  Equipment obtained during hospitalization: none    Home Oxygen and Respiratory Equipment:  Oxygen needed at discharge?: No  [address]: Not Applicable  Portable tank available for discharge?: No    Dialysis:  Dialysis patient: No    Dialysis Center:  Not Applicable    Hospice Services:  Location: Not Applicable  Agency: Not Applicable    Consents signed: No    Referrals made at [facility] for outpatient continued care:  Not Applicable    Additional CM Notes: Patient to discharge home with home care today.  She has no
the prescription(s)  4. Cost of Rx cannot be added to hospital bill. If financial assistance is needed, please contact unit  or ;  or  CANNOT provide pharmacy voucher for patients co-pays  5. Patients can then  the prescription on their way out of the hospital at discharge, or pharmacy can deliver to the bedside if staff is available. (payment due at time of pick-up or delivery - cash, check, or card accepted)     Able to afford home medications/ co-pay costs: Yes    ADLS  Support Systems: Spouse/Significant Other, Children    PT AM-PAC:   /24  OT AM-PAC:   /24    New [place]: lives at home with spouse  Steps:     Plans to RETURN to current housing: Yes  Barriers to RETURNING to current housing: none    Home Care Information  Currently ACTIVE with [address]: No  Home Care Agency: Not Applicable    Currently ACTIVE with [place] [facility]: No  Passport/ Waiver: No  Passport/ Waiver Services: Not Applicable    Durable Medical Equipment  DME Provider:   Equipment:     Home Oxygen and [address] prior to admission: No  [address]: Not Applicable  Other Respiratory Equipment:     Informed of need to bring portable home O2 tank on day of DISCHARGE for nursing to connect prior to leaving: No  Verbalized agreement/Understanding: No  Person to bring portable tank at discharge:     Dialysis  Active with HD/PD prior to admission: No-ESRD but not on HD at this time  Nephrologist: [address] [place] [address] Drive:  Not Applicable    DISCHARGE PLAN:  Disposition: Home- No Services Needed    Transportation PLAN for discharge: family     Factors facilitating achievement of predicted outcomes: Family support and Cooperative    Barriers to discharge: none at this time    Additional Case Management Notes:   SW met with Pt at bedside this AM. Pt lives at home with spouse.  PTA, Pt states she is independent with all ADL's but does not drive;

## 2020-08-18 NOTE — PROGRESS NOTES
Physician Progress Note      PATIENT:               Benitez Steen  CSN #:                  [id number]  :                       1950  ADMIT DATE:       2020 6:42 PM  [address] DATE:        8/10/2020 12:50 PM  RESPONDING  PROVIDER #:        Marion Boast MD          QUERY TEXT:    Patient admitted  with WANDER on CKD 3 d/t Myeloma nephropathy. Noted   documentation in PMH of \"ESRD not on dialysis\", but \"WANDER on CKD 3\" documented   throughout chart. If possible, please document  if you were evaluating and /or treating any of   the following: The medical record reflects the following:  Risk Factors: Multiple Myloma relapse  Clinical Indicators: Per Nephrology \"history of myeloma kidney and was on   dialysis. Her kidney function did get better with the chemo\", Per Nephrology   consult addendum \"Pt has WANDER on CKD 3\". BUN/ CR/ GFR trend during stay: 26-   33/ 2.9- 1.2/ 19-53. Treatment: IVF, Chemo started  Options provided:  -- WANDER on CKD stage 3 confirmed and ESRD ruled out  -- Other - I will add my own diagnosis  -- Disagree - Not applicable / Not valid  -- Disagree - Clinically unable to determine / Unknown  -- Refer to Clinical Documentation Reviewer    PROVIDER RESPONSE TEXT:    After study, CKD stage 3 confirmed and ESRD ruled out. Query created by:  Sierra Moreno on 2020 2:39 PM      Electronically signed by:  Marion Boast MD 2020 2:56 PM

## 2020-08-25 ENCOUNTER — TELEPHONE (OUTPATIENT)
Dept: INTERNAL MEDICINE CLINIC | Age: 70
End: 2020-08-25

## 2020-08-25 NOTE — TELEPHONE ENCOUNTER
Stop taking the Amlodipine 10 mg tablet. Continue taking the Metoprolol Tartrate 25 mg twice daily. Call placed to [facility]. Advised patient's daughter. Script sent to pharmacy.

## 2020-08-28 ENCOUNTER — OFFICE VISIT (OUTPATIENT)
Dept: INTERNAL MEDICINE CLINIC | Age: 70
End: 2020-08-28
Payer: MEDICARE

## 2020-08-28 VITALS
BODY MASS INDEX: 16.84 KG/M2 | DIASTOLIC BLOOD PRESSURE: 68 MMHG | WEIGHT: 101.2 LBS | TEMPERATURE: 97.7 F | SYSTOLIC BLOOD PRESSURE: 122 MMHG

## 2020-08-28 PROBLEM — N18.9 ACUTE KIDNEY INJURY SUPERIMPOSED ON CHRONIC KIDNEY DISEASE (HCC): Status: RESOLVED | Noted: 2020-08-06 | Resolved: 2020-08-28

## 2020-08-28 PROBLEM — N17.9 ACUTE KIDNEY INJURY SUPERIMPOSED ON CHRONIC KIDNEY DISEASE (HCC): Status: RESOLVED | Noted: 2020-08-06 | Resolved: 2020-08-28

## 2020-08-28 PROCEDURE — 99213 OFFICE O/P EST LOW 20 MIN: CPT | Performed by: INTERNAL MEDICINE

## 2020-08-28 PROCEDURE — 1111F DSCHRG MED/CURRENT MED MERGE: CPT | Performed by: INTERNAL MEDICINE

## 2020-08-28 PROCEDURE — 4004F PT TOBACCO SCREEN RCVD TLK: CPT | Performed by: INTERNAL MEDICINE

## 2020-08-28 PROCEDURE — G8399 PT W/DXA RESULTS DOCUMENT: HCPCS | Performed by: INTERNAL MEDICINE

## 2020-08-28 PROCEDURE — G8418 CALC BMI BLW LOW PARAM F/U: HCPCS | Performed by: INTERNAL MEDICINE

## 2020-08-28 PROCEDURE — 4040F PNEUMOC VAC/ADMIN/RCVD: CPT | Performed by: INTERNAL MEDICINE

## 2020-08-28 PROCEDURE — 1090F PRES/ABSN URINE INCON ASSESS: CPT | Performed by: INTERNAL MEDICINE

## 2020-08-28 PROCEDURE — G8427 DOCREV CUR MEDS BY ELIG CLIN: HCPCS | Performed by: INTERNAL MEDICINE

## 2020-08-28 PROCEDURE — 90694 VACC AIIV4 NO PRSRV 0.5ML IM: CPT | Performed by: INTERNAL MEDICINE

## 2020-08-28 PROCEDURE — 3017F COLORECTAL CA SCREEN DOC REV: CPT | Performed by: INTERNAL MEDICINE

## 2020-08-28 PROCEDURE — 1123F ACP DISCUSS/DSCN MKR DOCD: CPT | Performed by: INTERNAL MEDICINE

## 2020-08-28 PROCEDURE — G0008 ADMIN INFLUENZA VIRUS VAC: HCPCS | Performed by: INTERNAL MEDICINE

## 2020-08-28 NOTE — PROGRESS NOTES
Vaccine Information Sheet, \"Influenza - Inactivated\"  given to Pushpa Lew, or parent/legal guardian of  Pushpa Lew and verbalized understanding. Patient responses:    Have you ever had a reaction to a flu vaccine? No  Do you have any current illness? No  Have you ever had [place] Syndrome? No  Do you have a serious allergy to any of the follow: Neomycin, Polymyxin, Thimerosal, eggs or egg products? No    Flu vaccine given per order. Please see immunization tab. Risks and benefits explained. Current VIS given.

## 2020-08-28 NOTE — PROGRESS NOTES
CHIEF COMPLAINT: Ayesha Kaplan is a 65 y.o. female who presents for : Follow-up of multiple myeloma and myeloma kidney    HPI: Patient presented with follow-up of her multiple myeloma and myeloid kidney she is feeling much better now she is getting chemotherapy she had blood test yesterday which showed an her BUN and creatinine were stable but had low potassium she is on potassium supplements she still having some decreased appetite    Review of Systems:   Constitutional:  Denies fever or chills   Eyes:  Denies change in visual acuity   HENT:  Denies nasal congestion or sore throat   Respiratory:  Denies cough or shortness of breath   Cardiovascular:  Denies chest pain or edema   GI:  Denies abdominal pain, nausea, vomiting, bloody stools or diarrhea   :  Denies dysuria   Musculoskeletal:  Denies back pain or joint pain   Integument:  Denies rash   Neurologic:  Denies headache, focal weakness or sensory changes   Endocrine:  Denies polyuria or polydipsia   Lymphatic:  Denies swollen glands   Psychiatric:  Denies depression or anxiety     Past Medical History:        Diagnosis Date    Allergic rhinitis     Anemia     CAD (coronary artery disease)     CAD (coronary artery disease)     Diverticulosis     ESRD (end stage renal disease) on dialysis ([facility] 2020    GERD (gastroesophageal reflux disease)     History of colonoscopy     Hyperlipidemia     Hypertension     Myocardial infarction, inferior wall [facility]) 2006       Past Surgical History:        Procedure Laterality Date    BREAST REDUCTION SURGERY  Bilat     SECTION  x3    CT BONE MARROW BIOPSY  2020    CT BONE MARROW BIOPSY 2020 [facility] CT SCAN    CT NEEDLE BIOPSY LIVER PERCUTANEOUS  2020    CT NEEDLE BIOPSY LIVER PERCUTANEOUS 2020 [facility] CT SCAN    HYSTERECTOMY      TYMPANOPLASTY         Family History:  Family History   Problem Relation Age of Onset    Heart Disease Mother     High Cholesterol Mother  Heart Disease Sister         hypertension    Diabetes Sister         hypertension    High Cholesterol Sister         hypertension    Heart Disease Brother     High Cholesterol Brother     Stroke Brother         hypertension       Social History:  Social History     Socioeconomic History    Marital status:      Spouse name: Not on file    Number of children: Not on file    Years of education: Not on file    Highest education level: Not on file   Occupational History    Not on file   Social Needs    Financial resource strain: Not on file    Food insecurity     Worry: Not on file     Inability: Not on file    Transportation needs     Medical: Not on file     Non-medical: Not on file   Tobacco Use    Smoking status: Current Every Day Smoker     Packs/day: 0.50     Years: 45.00     Pack years: 22.50    Smokeless tobacco: Never Used    Tobacco comment: 1 pack q 3 days   Substance and Sexual Activity    Alcohol use: No     Alcohol/week: 0.0 standard drinks    Drug use: No    Sexual activity: Never   Lifestyle    Physical activity     Days per week: Not on file     Minutes per session: Not on file    Stress: Not on file   Relationships    Social connections     Talks on phone: Not on file     Gets together: Not on file     Attends Zoroastrianism service: Not on file     Active member of club or organization: Not on file     Attends meetings of clubs or organizations: Not on file     Relationship status: Not on file    Intimate partner violence     Fear of current or ex partner: Not on file     Emotionally abused: Not on file     Physically abused: Not on file     Forced sexual activity: Not on file   Other Topics Concern    Not on file   Social History Narrative    Not on file         Allergies:  Patient has no known allergies. Current Medications:    Prior to Admission medications    Medication Sig Start Date End Date Taking?  Authorizing Provider   calcium carbonate (OSCAL) 500 MG TABS tablet Take 500 mg by mouth daily   Yes Historical Provider, MD   Cholecalciferol (VITAMIN D) 50 MCG (2000 UT) CAPS capsule Take by mouth   Yes Historical Provider, MD   metoprolol tartrate (LOPRESSOR) 25 MG tablet Take 1 tablet by mouth twice daily 8/25/20   Colt Garcia MD   amLODIPine (NORVASC) 10 MG tablet Take 1 tablet by mouth daily 8/15/20   Anthony Marte MD   potassium chloride (KLOR-CON M) 20 MEQ extended release tablet Take 1 tablet by mouth 2 times daily 7/28/20   Drew Sanabria MD   acyclovir (ZOVIRAX) 400 MG tablet 1 tablet 2 times daily 5/22/20   Historical Provider, MD   ondansetron (ZOFRAN) 8 MG tablet TAKE 1 TABLET BY MOUTH EVERY 8 HOURS AS NEEDED FOR NAUSEA AND VOMITING 4/8/20   Historical Provider, MD   pantoprazole (PROTONIX) 40 MG tablet TAKE 1 TABLET BY MOUTH ONCE DAILY IN THE MORNING BEFORE BREAKFAST 5/18/20   Colt Garcia MD   atorvastatin (LIPITOR) 40 MG tablet Take 1 tablet by mouth nightly 5/4/20   Colt Garcia MD   dexamethasone (DECADRON) 2 MG tablet Take 5 tablets by mouth See Admin Instructions Take 5 tablets (10 mg) once per day, on Wed 4/8 & Sat 4/11 (2 doses in total). 4/7/20   Gerri Cornell MD   acetaminophen (TYLENOL) 325 MG tablet Take 650 mg by mouth every 6 hours as needed for Pain    Historical Provider, MD       Physical Exam:  Vital Signs: /68 (Site: Left Upper Arm, Position: Sitting)   Temp 97.7 °F (36.5 °C) (Temporal)   Wt 101 lb 3.2 oz (45.9 kg)   BMI 16.84 kg/m²   General: Patient appears  non-toxic thin  HENT: Atraumatic, normocephalic, oral mucosa moist  Lungs:  Clear bilaterally  Heart: Regular rate and rhythm  Abdomen: Non-distended, soft, non-tender  Extremities: No edema  Neuro: Nonfocal    Medical Decision Making and Plan:  Pertinent Labs & Imaging studies reviewed. (See chart for details)  Studies were done 1 day ago    1.  Multiple myeloma not having achieved remission ([facility]  To follow-up with oncology for chemo  - [place] DISCHARGE MEDS RECONCILED W/ CURRENT OUTPATIENT MED LIST    2.  Essential hypertension  Problem is stable  To get flu shot and follow-up in 1 month

## 2020-09-02 ENCOUNTER — HOSPITAL ENCOUNTER (INPATIENT)
Age: 70
LOS: 3 days | Discharge: HOSPICE | DRG: 640 | End: 2020-09-05
Attending: INTERNAL MEDICINE | Admitting: INTERNAL MEDICINE
Payer: COMMERCIAL

## 2020-09-02 ENCOUNTER — APPOINTMENT (OUTPATIENT)
Dept: GENERAL RADIOLOGY | Age: 70
DRG: 640 | End: 2020-09-02
Attending: INTERNAL MEDICINE
Payer: COMMERCIAL

## 2020-09-02 PROBLEM — N17.9 AKI (ACUTE KIDNEY INJURY) (HCC): Status: ACTIVE | Noted: 2020-09-02

## 2020-09-02 LAB
ALBUMIN SERPL-MCNC: 3.1 G/DL (ref 3.4–5)
ALP BLD-CCNC: 80 U/L (ref 40–129)
ALT SERPL-CCNC: 10 U/L (ref 10–40)
ANION GAP SERPL CALCULATED.3IONS-SCNC: 13 MMOL/L (ref 3–16)
ANISOCYTOSIS: ABNORMAL
APTT: 27.1 SEC (ref 24.2–36.2)
AST SERPL-CCNC: 9 U/L (ref 15–37)
BASOPHILS ABSOLUTE: 0 K/UL (ref 0–0.2)
BASOPHILS RELATIVE PERCENT: 2 %
BILIRUB SERPL-MCNC: 0.4 MG/DL (ref 0–1)
BILIRUBIN DIRECT: <0.2 MG/DL (ref 0–0.3)
BILIRUBIN, INDIRECT: ABNORMAL MG/DL (ref 0–1)
BUN BLDV-MCNC: 22 MG/DL (ref 7–20)
CALCIUM SERPL-MCNC: 8.3 MG/DL (ref 8.3–10.6)
CHLORIDE BLD-SCNC: 121 MMOL/L (ref 99–110)
CO2: 20 MMOL/L (ref 21–32)
CREAT SERPL-MCNC: 1.2 MG/DL (ref 0.6–1.2)
EKG ATRIAL RATE: 117 BPM
EKG DIAGNOSIS: NORMAL
EKG P AXIS: 75 DEGREES
EKG P-R INTERVAL: 122 MS
EKG Q-T INTERVAL: 318 MS
EKG QRS DURATION: 84 MS
EKG QTC CALCULATION (BAZETT): 443 MS
EKG R AXIS: 39 DEGREES
EKG T AXIS: 86 DEGREES
EKG VENTRICULAR RATE: 117 BPM
EOSINOPHILS ABSOLUTE: 0 K/UL (ref 0–0.6)
EOSINOPHILS RELATIVE PERCENT: 1.4 %
GFR AFRICAN AMERICAN: 53
GFR NON-AFRICAN AMERICAN: 44
GLUCOSE BLD-MCNC: 127 MG/DL (ref 70–99)
HCT VFR BLD CALC: 28.9 % (ref 36–48)
HEMATOLOGY PATH CONSULT: NO
HEMOGLOBIN: 9.2 G/DL (ref 12–16)
INR BLD: 1.2 (ref 0.86–1.14)
LACTATE DEHYDROGENASE: 265 U/L (ref 100–190)
LYMPHOCYTES ABSOLUTE: 0.2 K/UL (ref 1–5.1)
LYMPHOCYTES RELATIVE PERCENT: 15.1 %
MAGNESIUM: 1.2 MG/DL (ref 1.8–2.4)
MCH RBC QN AUTO: 28 PG (ref 26–34)
MCHC RBC AUTO-ENTMCNC: 31.9 G/DL (ref 31–36)
MCV RBC AUTO: 87.7 FL (ref 80–100)
MONOCYTES ABSOLUTE: 0.1 K/UL (ref 0–1.3)
MONOCYTES RELATIVE PERCENT: 5.5 %
NEUTROPHILS ABSOLUTE: 1.1 K/UL (ref 1.7–7.7)
NEUTROPHILS RELATIVE PERCENT: 76 %
OVALOCYTES: ABNORMAL
PDW BLD-RTO: 21.6 % (ref 12.4–15.4)
PHOSPHORUS: 3.4 MG/DL (ref 2.5–4.9)
PLATELET # BLD: 53 K/UL (ref 135–450)
PLATELET SLIDE REVIEW: ABNORMAL
PMV BLD AUTO: 9.1 FL (ref 5–10.5)
POTASSIUM SERPL-SCNC: 4 MMOL/L (ref 3.5–5.1)
PROTHROMBIN TIME: 14 SEC (ref 10–13.2)
RBC # BLD: 3.29 M/UL (ref 4–5.2)
SLIDE REVIEW: ABNORMAL
SODIUM BLD-SCNC: 154 MMOL/L (ref 136–145)
SODIUM BLD-SCNC: 155 MMOL/L (ref 136–145)
TARGET CELLS: ABNORMAL
TOTAL PROTEIN: 6 G/DL (ref 6.4–8.2)
URIC ACID, SERUM: 5.9 MG/DL (ref 2.6–6)
WBC # BLD: 1.5 K/UL (ref 4–11)

## 2020-09-02 PROCEDURE — 71046 X-RAY EXAM CHEST 2 VIEWS: CPT

## 2020-09-02 PROCEDURE — 84295 ASSAY OF SERUM SODIUM: CPT

## 2020-09-02 PROCEDURE — 80076 HEPATIC FUNCTION PANEL: CPT

## 2020-09-02 PROCEDURE — 85025 COMPLETE CBC W/AUTO DIFF WBC: CPT

## 2020-09-02 PROCEDURE — 85610 PROTHROMBIN TIME: CPT

## 2020-09-02 PROCEDURE — 84550 ASSAY OF BLOOD/URIC ACID: CPT

## 2020-09-02 PROCEDURE — 94150 VITAL CAPACITY TEST: CPT

## 2020-09-02 PROCEDURE — 94799 UNLISTED PULMONARY SVC/PX: CPT

## 2020-09-02 PROCEDURE — 85730 THROMBOPLASTIN TIME PARTIAL: CPT

## 2020-09-02 PROCEDURE — 2060000000 HC ICU INTERMEDIATE R&B

## 2020-09-02 PROCEDURE — 93005 ELECTROCARDIOGRAM TRACING: CPT | Performed by: PHYSICIAN ASSISTANT

## 2020-09-02 PROCEDURE — 80048 BASIC METABOLIC PNL TOTAL CA: CPT

## 2020-09-02 PROCEDURE — 93010 ELECTROCARDIOGRAM REPORT: CPT | Performed by: INTERNAL MEDICINE

## 2020-09-02 PROCEDURE — 83735 ASSAY OF MAGNESIUM: CPT

## 2020-09-02 PROCEDURE — 2580000003 HC RX 258: Performed by: PHYSICIAN ASSISTANT

## 2020-09-02 PROCEDURE — 3E05317 INTRODUCTION OF OTHER THROMBOLYTIC INTO PERIPHERAL ARTERY, PERCUTANEOUS APPROACH: ICD-10-PCS | Performed by: INTERNAL MEDICINE

## 2020-09-02 PROCEDURE — 83615 LACTATE (LD) (LDH) ENZYME: CPT

## 2020-09-02 PROCEDURE — 2580000003 HC RX 258: Performed by: INTERNAL MEDICINE

## 2020-09-02 PROCEDURE — 6360000002 HC RX W HCPCS: Performed by: PHYSICIAN ASSISTANT

## 2020-09-02 PROCEDURE — 84100 ASSAY OF PHOSPHORUS: CPT

## 2020-09-02 RX ORDER — GABAPENTIN 300 MG/1
300 CAPSULE ORAL 3 TIMES DAILY
COMMUNITY
End: 2020-09-14

## 2020-09-02 RX ORDER — POTASSIUM CHLORIDE 7.45 MG/ML
10 INJECTION INTRAVENOUS PRN
Status: DISCONTINUED | OUTPATIENT
Start: 2020-09-02 | End: 2020-09-05 | Stop reason: HOSPADM

## 2020-09-02 RX ORDER — SODIUM CHLORIDE 9 MG/ML
500 INJECTION, SOLUTION INTRAVENOUS CONTINUOUS PRN
Status: DISCONTINUED | OUTPATIENT
Start: 2020-09-02 | End: 2020-09-05 | Stop reason: HOSPADM

## 2020-09-02 RX ORDER — METOPROLOL TARTRATE 50 MG/1
25 TABLET, FILM COATED ORAL 2 TIMES DAILY
Status: DISCONTINUED | OUTPATIENT
Start: 2020-09-02 | End: 2020-09-05 | Stop reason: HOSPADM

## 2020-09-02 RX ORDER — MAGNESIUM SULFATE IN WATER 40 MG/ML
4 INJECTION, SOLUTION INTRAVENOUS PRN
Status: DISCONTINUED | OUTPATIENT
Start: 2020-09-02 | End: 2020-09-05 | Stop reason: HOSPADM

## 2020-09-02 RX ORDER — PANTOPRAZOLE SODIUM 40 MG/1
40 TABLET, DELAYED RELEASE ORAL
Status: DISCONTINUED | OUTPATIENT
Start: 2020-09-03 | End: 2020-09-05 | Stop reason: HOSPADM

## 2020-09-02 RX ORDER — VITAMIN B COMPLEX
2000 TABLET ORAL DAILY
Status: DISCONTINUED | OUTPATIENT
Start: 2020-09-03 | End: 2020-09-05 | Stop reason: HOSPADM

## 2020-09-02 RX ORDER — ONDANSETRON HYDROCHLORIDE 8 MG/1
8 TABLET, FILM COATED ORAL EVERY 8 HOURS PRN
Status: DISCONTINUED | OUTPATIENT
Start: 2020-09-02 | End: 2020-09-05 | Stop reason: HOSPADM

## 2020-09-02 RX ORDER — GABAPENTIN 300 MG/1
300 CAPSULE ORAL DAILY
Status: DISCONTINUED | OUTPATIENT
Start: 2020-09-03 | End: 2020-09-03

## 2020-09-02 RX ORDER — DULOXETIN HYDROCHLORIDE 30 MG/1
30 CAPSULE, DELAYED RELEASE ORAL DAILY
COMMUNITY
End: 2020-09-14

## 2020-09-02 RX ORDER — DEXTROSE MONOHYDRATE 50 MG/ML
INJECTION, SOLUTION INTRAVENOUS CONTINUOUS
Status: DISCONTINUED | OUTPATIENT
Start: 2020-09-02 | End: 2020-09-04

## 2020-09-02 RX ORDER — ACETAMINOPHEN 325 MG/1
650 TABLET ORAL EVERY 6 HOURS PRN
Status: DISCONTINUED | OUTPATIENT
Start: 2020-09-02 | End: 2020-09-05 | Stop reason: HOSPADM

## 2020-09-02 RX ORDER — CALCIUM CARBONATE 500(1250)
500 TABLET ORAL DAILY
Status: DISCONTINUED | OUTPATIENT
Start: 2020-09-03 | End: 2020-09-05 | Stop reason: HOSPADM

## 2020-09-02 RX ORDER — SODIUM CHLORIDE 0.9 % (FLUSH) 0.9 %
10 SYRINGE (ML) INJECTION PRN
Status: DISCONTINUED | OUTPATIENT
Start: 2020-09-02 | End: 2020-09-05 | Stop reason: HOSPADM

## 2020-09-02 RX ORDER — ATORVASTATIN CALCIUM 40 MG/1
40 TABLET, FILM COATED ORAL NIGHTLY
Status: DISCONTINUED | OUTPATIENT
Start: 2020-09-02 | End: 2020-09-05 | Stop reason: HOSPADM

## 2020-09-02 RX ORDER — ONDANSETRON 2 MG/ML
8 INJECTION INTRAMUSCULAR; INTRAVENOUS EVERY 8 HOURS PRN
Status: DISCONTINUED | OUTPATIENT
Start: 2020-09-02 | End: 2020-09-05 | Stop reason: HOSPADM

## 2020-09-02 RX ORDER — ACYCLOVIR 400 MG/1
400 TABLET ORAL 2 TIMES DAILY
Status: DISCONTINUED | OUTPATIENT
Start: 2020-09-02 | End: 2020-09-05 | Stop reason: HOSPADM

## 2020-09-02 RX ORDER — POTASSIUM CHLORIDE 20 MEQ/1
40 TABLET, EXTENDED RELEASE ORAL
Status: DISCONTINUED | OUTPATIENT
Start: 2020-09-03 | End: 2020-09-04

## 2020-09-02 RX ORDER — SODIUM CHLORIDE 0.9 % (FLUSH) 0.9 %
10 SYRINGE (ML) INJECTION EVERY 12 HOURS SCHEDULED
Status: DISCONTINUED | OUTPATIENT
Start: 2020-09-02 | End: 2020-09-05 | Stop reason: HOSPADM

## 2020-09-02 RX ORDER — DULOXETIN HYDROCHLORIDE 30 MG/1
30 CAPSULE, DELAYED RELEASE ORAL DAILY
Status: DISCONTINUED | OUTPATIENT
Start: 2020-09-03 | End: 2020-09-05 | Stop reason: HOSPADM

## 2020-09-02 RX ADMIN — MAGNESIUM SULFATE HEPTAHYDRATE 4 G: 40 INJECTION, SOLUTION INTRAVENOUS at 16:05

## 2020-09-02 RX ADMIN — DEXTROSE AND SODIUM CHLORIDE: 5; 450 INJECTION, SOLUTION INTRAVENOUS at 14:03

## 2020-09-02 RX ADMIN — ENOXAPARIN SODIUM 40 MG: 40 INJECTION SUBCUTANEOUS at 18:48

## 2020-09-02 RX ADMIN — DEXTROSE MONOHYDRATE: 50 INJECTION, SOLUTION INTRAVENOUS at 22:29

## 2020-09-02 ASSESSMENT — PAIN SCALES - GENERAL
PAINLEVEL_OUTOF10: 0
PAINLEVEL_OUTOF10: 0

## 2020-09-02 NOTE — PROGRESS NOTES
Patient admitted to [facility] via wheelchair from [facility] for diagnosis of hypernatremia/FTT. Pt is confused, oriented to person only. Does not want to hold conversation and only says one word answers to questions. Pt is a high fall risk. Safety measures instituted per policy. Pt very weak, 1-2 person assist up to bedside commode. Skin is intact. Pt denies any pain. Pts daughter, Jeanne Atkins, arrived this evening and updated RN that pt has been unable to eat or drink anything or take medications for past two days. Will monitor closely.

## 2020-09-02 NOTE — CARE COORDINATION
Type of Admission  Multiple Myeloma  Admit with Failure to thrive        Central venous catheter  PIV        Plan          Update  9/2/20: Admitted from [facility] office with failure to thrive symptoms. Poor oral intake over past week, reports nausea. Education          Discharge  9/2/20:  Re-introduced myself in RN D/C Planner role on admit, no family present.         Pending

## 2020-09-02 NOTE — H&P
up 20 with increased weakness and hypernatremia (156) and she was agreeable to admission. Given her decline, she will now be admitted for additional hydration and a nephrology consult. Dietician will also be consulted on admit for assistance with nutrition. Past Surgical History:   Procedure Laterality Date    BREAST REDUCTION SURGERY  Bilat     SECTION  x3    CT BONE MARROW BIOPSY  2020    CT BONE MARROW BIOPSY 2020 [facility] CT SCAN    CT NEEDLE BIOPSY LIVER PERCUTANEOUS  2020    CT NEEDLE BIOPSY LIVER PERCUTANEOUS 2020 [facility] CT SCAN    HYSTERECTOMY      TYMPANOPLASTY         Past Medical History:   Diagnosis Date    Allergic rhinitis     Anemia     CAD (coronary artery disease)     CAD (coronary artery disease)     Diverticulosis     ESRD (end stage renal disease) on dialysis ([facility] [address] 2020    GERD (gastroesophageal reflux disease)     History of colonoscopy     Hyperlipidemia     Hypertension     Myocardial infarction, inferior wall [facility]) 2006       Prior to Admission medications    Medication Sig Start Date End Date Taking?  Authorizing Provider   metoprolol tartrate (LOPRESSOR) 25 MG tablet Take 1 tablet by mouth twice daily 20   Harriet Brink MD   amLODIPine (NORVASC) 10 MG tablet Take 1 tablet by mouth daily 8/15/20   Kenneth Rizzo MD   potassium chloride (KLOR-CON M) 20 MEQ extended release tablet Take 1 tablet by mouth 2 times daily 20   Brenda Albrecht MD   calcium carbonate (OSCAL) 500 MG TABS tablet Take 500 mg by mouth daily    Historical Provider, MD   acyclovir (ZOVIRAX) 400 MG tablet 1 tablet 2 times daily 20   Historical Provider, MD   ondansetron (ZOFRAN) 8 MG tablet TAKE 1 TABLET BY MOUTH EVERY 8 HOURS AS NEEDED FOR NAUSEA AND VOMITING 20   Historical Provider, MD   pantoprazole (PROTONIX) 40 MG tablet TAKE 1 TABLET BY MOUTH ONCE DAILY IN THE MORNING BEFORE BREAKFAST 20   Harriet Brink MD   atorvastatin (LIPITOR) 40 MG tablet Take 1 tablet by mouth nightly 5/4/20   Harriet Brink MD   Cholecalciferol (VITAMIN D) 50 MCG (2000 UT) CAPS capsule Take by mouth    Historical Provider, MD   dexamethasone (DECADRON) 2 MG tablet Take 5 tablets by mouth See Admin Instructions Take 5 tablets (10 mg) once per day, on Wed 4/8 & Sat 4/11 (2 doses in total).  4/7/20   Mann Cardona MD   acetaminophen (TYLENOL) 325 MG tablet Take 650 mg by mouth every 6 hours as needed for Pain    Historical Provider, MD       No Known Allergies    Family History   Problem Relation Age of Onset    Heart Disease Mother     High Cholesterol Mother     Heart Disease Sister         hypertension    Diabetes Sister         hypertension    High Cholesterol Sister         hypertension    Heart Disease Brother     High Cholesterol Brother     Stroke Brother         hypertension        Social History     Socioeconomic History    Marital status:      Spouse name: Not on file    Number of children: Not on file    Years of education: Not on file    Highest education level: Not on file   Occupational History    Not on file   Social Needs    Financial resource strain: Not on file    Food insecurity     Worry: Not on file     Inability: Not on file    Transportation needs     Medical: Not on file     Non-medical: Not on file   Tobacco Use    Smoking status: Current Every Day Smoker     Packs/day: 0.50     Years: 45.00     Pack years: 22.50    Smokeless tobacco: Never Used    Tobacco comment: 1 pack q 3 days   Substance and Sexual Activity    Alcohol use: No     Alcohol/week: 0.0 standard drinks    Drug use: No    Sexual activity: Never   Lifestyle    Physical activity     Days per week: Not on file     Minutes per session: Not on file    Stress: Not on file   Relationships    Social connections     Talks on phone: Not on file     Gets together: Not on file     Attends Rastafari service: Not on file     Active member of club or organization: Not on file     Attends meetings of clubs or organizations: Not on file     Relationship status: Not on file    Intimate partner violence     Fear of current or ex partner: Not on file     Emotionally abused: Not on file     Physically abused: Not on file     Forced sexual activity: Not on file   Other Topics Concern    Not on file   Social History Narrative    Not on file        ROS:  As noted above, otherwise remainder of 10-point ROS negative    Physical Exam:     Vital Signs: There were no vitals taken for this visit. Weight:    Wt Readings from Last 3 Encounters:   08/28/20 101 lb 3.2 oz (45.9 kg)   08/14/20 109 lb 5.6 oz (49.6 kg)   07/28/20 114 lb 9.6 oz (52 kg)       KPS: 50%  Requires considerable assistance and frequent medical care    ECOG PS:  (3) Capable of limited self-care, confined to bed or chair > 50% of waking hours    General: Awake, alert and oriented. HEENT: normocephalic, PERRL, no scleral erythema or icterus, Oral mucosa moist and intact, throat clear  NECK: supple without palpable adenopathy  BACK: Straight negative CVAT  SKIN: warm dry and intact without lesions rashes or masses  CHEST: CTA bilaterally without use of accessory muscles  CV: Normal S1 S2, RRR, no MRG  ABD: NT ND normoactive BS, no palpable masses or hepatosplenomegaly  EXTREMITIES: without edema, denies calf tenderness  NEURO: CN II - XII grossly intact  CATHETER: PIV    Laboratory Data:   CBC: No results for input(s): WBC, HGB, HCT, MCV, PLT in the last 72 hours. BMP/Mag:No results for input(s): NA, K, CL, CO2, PHOS, BUN, CREATININE, MG in the last 72 hours. Invalid input(s): CA  LIVP: No results for input(s): AST, ALT, LIPASE, BILIDIR, BILITOT, ALKPHOS in the last 72 hours. Invalid input(s): AMYLASE,  ALB  Coags: No results for input(s): PROTIME, INR, APTT in the last 72 hours. Uric Acid No results for input(s): LABURIC in the last 72 hours. PROBLEM LIST:             1. Multiple Myeloma  2. Hypernatremia   3. WANDER       TREATMENT:            1. Velcade/Dex (C1D1 4/1/2020)  - Cytoxan added 6/12/20  2. Ai/Estelle/Dex (8/13/20)          ASSESSMENT AND PLAN:           1. Multiple Myeloma: stage III by ISS, relapsed refractory   - S/p CyBorD with relapsed noted on liver biopsy 8/11/20      Plan: Ai/Estelle/Dex (C1D1 8/13/20)    Cycle 1 Day 21 today - ai/dex/estelle (renally dosed) - changed dex to weekly, not twice weekly       2. ID: afebrile   - Cont acyclovir ppx  - Blood cultures 8/18/20: 1/2 positive for staph epi; s/p a course of Linezolid     3. Heme:  Anemia r/t disease & CKD; panyctopenia d/t chemotx     - Transfuse for Hgb < 7 and Platelets < 10 K.  - Reticrit weekly     4. Metabolic: Admitted with WANDER & hypernatremia   - Followed by Nephrology (Federico Klein). Consult on Admission.  - Cont KCl 40 mEq daily   - Start D5 ½ NS @ 75ml/hr      5. Nutrition: poor PO intake   - Cont low microbial diet  - Consult dietician on admit   - Zofran 8mg prn for nausea  - Cont PPI daily     6. Psych  - Cont Cymbalta 30mg daily    7. Neuropathy  - Gabapentin 300 TID       - DVT Prophylaxis: Platelets >53,298 cells/dL, - daily lovenox prophylaxis ordered  Contraindications to pharmacologic prophylaxis: None  Contraindications to mechanical prophylaxis: None      - Disposition: Once improvement in Sodium and PO intake. The patient was seen and examined by Dr. Callum Lino. This admission history and physical has been discussed and agreed upon by Dr. Callum Lino.     GENAN Vásquez

## 2020-09-02 NOTE — CONSULTS
Office : [phone]     Fax :[phone]       Nephrology Consult Note      Patient's Name: Amanda Traylor  5:22 PM  2020    Reason for Consult:  Hypernatremia , Hypomagnesemia. Requesting Physician:  Michelle Sinclair MD      Chief Complaint: High sodium level, failure to thrive      History of Present Ilness:    Amanda Traylor is a 65 y.o. female with history of multiple myeloma, hypertension, coronary artery disease, WANDER, weakness who presented with complaint of feeling weak, poor appetite. She was admitted for treatment of hypernatremia. Initial blood work showed sodium of 154, BUN is 22 and creatinine is 1.2. Magnesium level is low at 1.2. She is awake alert responding to simple commands. Clinically is very dry. Oral mucosa is dry. She is denying complaints of any abdominal pain. Urine output has been low last few days.     She denies any fever chills  Has poor appetite last few days    Past Medical History:   Diagnosis Date    Allergic rhinitis     Anemia     CAD (coronary artery disease)     CAD (coronary artery disease)     Diverticulosis     ESRD (end stage renal disease) on dialysis ([facility] [address] 2020    GERD (gastroesophageal reflux disease)     History of colonoscopy     Hyperlipidemia     Hypertension     Myocardial infarction, inferior wall [facility]) 2006       Past Surgical History:   Procedure Laterality Date    BREAST REDUCTION SURGERY  Bilat     SECTION  x3    CT BONE MARROW BIOPSY  2020    CT BONE MARROW BIOPSY 2020 [facility] CT SCAN    CT NEEDLE BIOPSY LIVER PERCUTANEOUS  2020    CT NEEDLE BIOPSY LIVER PERCUTANEOUS 2020 [facility] CT SCAN    HYSTERECTOMY      TYMPANOPLASTY         Family History   Problem Relation Age of Onset    Heart Disease Mother     High Cholesterol Mother     Heart Disease Sister         hypertension    Diabetes Sister         hypertension    High Cholesterol Sister         hypertension    Heart Disease Brother     High Cholesterol Brother     Stroke Brother         hypertension        reports that she has been smoking. She has a 22.50 pack-year smoking history. She has never used smokeless tobacco. She reports that she does not drink alcohol or use drugs. Allergies:  Patient has no known allergies.     Current Medications:    acetaminophen (TYLENOL) tablet 650 mg, Q6H PRN  acyclovir (ZOVIRAX) tablet 400 mg, BID  atorvastatin (LIPITOR) tablet 40 mg, Nightly  calcium carbonate (OSCAL) tablet 500 mg, Daily  vitamin D capsule CAPS 2,000 Units, Daily  metoprolol tartrate (LOPRESSOR) tablet 25 mg, BID  DULoxetine (CYMBALTA) extended release capsule 30 mg, Daily  gabapentin (NEURONTIN) capsule 300 mg, TID  [START ON 9/3/2020] pantoprazole (PROTONIX) tablet 40 mg, QAM AC  ondansetron (ZOFRAN) tablet 8 mg, Q8H PRN  [START ON 9/3/2020] potassium chloride (KLOR-CON M) extended release tablet 40 mEq, Daily with breakfast  dextrose 5 % and 0.45 % NaCl 1,000 mL infusion, Continuous  0.9 % sodium chloride infusion, Continuous PRN  sodium chloride flush 0.9 % injection 10 mL, 2 times per day  sodium chloride flush 0.9 % injection 10 mL, PRN  potassium chloride 10 mEq/100 mL IVPB (Peripheral Line), PRN  magnesium sulfate 4 g in 100 mL IVPB premix, PRN  magnesium hydroxide (MILK OF MAGNESIA) 400 MG/5ML suspension 30 mL, Daily PRN  Saline Mouthwash 15 mL, 4x Daily AC & HS  Saline Mouthwash 15 mL, Q4H PRN  alteplase (CATHFLO) injection 2 mg, PRN  enoxaparin (LOVENOX) injection 40 mg, Daily  ondansetron (ZOFRAN) injection 8 mg, Q8H PRN        Review of Systems:   14 point ROS obtained but were negative except mentioned in HPI      Physical exam:     Vitals:  /72   Pulse 118   Temp 98.3 °F (36.8 °C) (Oral) now.  Check BMP at 9 PM.  If Sodium levels persistently elevated then will switch to D5 water at 75 ml per hour. discussed with the nurse       2. HTN. On metoprolol    3. Anemia of chronic disease    4. Multiple myeloma. Received chemotherapy in the past.  Oncology on board    5. Failure to thrive. Slow introduction of oral intake.         We will continue to monitor electrolytes, renal function  History of WANDER and has required hemodialysis but at this time renal function stable              Thank you for allowing us to participate in care of Jonny Eid         Electronically signed by: Carolina Johnson MD, 9/2/2020, [address]      [facility]  Office : [phone]  Fax :[phone]

## 2020-09-03 LAB
ANION GAP SERPL CALCULATED.3IONS-SCNC: 14 MMOL/L (ref 3–16)
ANISOCYTOSIS: ABNORMAL
APTT: 27.5 SEC (ref 24.2–36.2)
BANDED NEUTROPHILS RELATIVE PERCENT: 16 % (ref 0–7)
BASOPHILS ABSOLUTE: 0 K/UL (ref 0–0.2)
BASOPHILS RELATIVE PERCENT: 2 %
BUN BLDV-MCNC: 18 MG/DL (ref 7–20)
CALCIUM SERPL-MCNC: 8 MG/DL (ref 8.3–10.6)
CHLORIDE BLD-SCNC: 119 MMOL/L (ref 99–110)
CO2: 19 MMOL/L (ref 21–32)
CREAT SERPL-MCNC: 1 MG/DL (ref 0.6–1.2)
EOSINOPHILS ABSOLUTE: 0 K/UL (ref 0–0.6)
EOSINOPHILS RELATIVE PERCENT: 4 %
GFR AFRICAN AMERICAN: >60
GFR NON-AFRICAN AMERICAN: 54
GLUCOSE BLD-MCNC: 153 MG/DL (ref 70–99)
HCT VFR BLD CALC: 26 % (ref 36–48)
HEMOGLOBIN: 8.4 G/DL (ref 12–16)
HYPOCHROMIA: ABNORMAL
INR BLD: 1.09 (ref 0.86–1.14)
LYMPHOCYTES ABSOLUTE: 0.2 K/UL (ref 1–5.1)
LYMPHOCYTES RELATIVE PERCENT: 22 %
MAGNESIUM: 2.6 MG/DL (ref 1.8–2.4)
MCH RBC QN AUTO: 27.9 PG (ref 26–34)
MCHC RBC AUTO-ENTMCNC: 32.3 G/DL (ref 31–36)
MCV RBC AUTO: 86.3 FL (ref 80–100)
MONOCYTES ABSOLUTE: 0.1 K/UL (ref 0–1.3)
MONOCYTES RELATIVE PERCENT: 8 %
NEUTROPHILS ABSOLUTE: 0.7 K/UL (ref 1.7–7.7)
NEUTROPHILS RELATIVE PERCENT: 48 %
PDW BLD-RTO: 21.2 % (ref 12.4–15.4)
PLATELET # BLD: 52 K/UL (ref 135–450)
PLATELET SLIDE REVIEW: ABNORMAL
PMV BLD AUTO: 9.7 FL (ref 5–10.5)
POIKILOCYTES: ABNORMAL
POTASSIUM SERPL-SCNC: 3.6 MMOL/L (ref 3.5–5.1)
PROTHROMBIN TIME: 12.7 SEC (ref 10–13.2)
RBC # BLD: 3.01 M/UL (ref 4–5.2)
SODIUM BLD-SCNC: 145 MMOL/L (ref 136–145)
SODIUM BLD-SCNC: 147 MMOL/L (ref 136–145)
SODIUM BLD-SCNC: 152 MMOL/L (ref 136–145)
WBC # BLD: 1.1 K/UL (ref 4–11)

## 2020-09-03 PROCEDURE — 83735 ASSAY OF MAGNESIUM: CPT

## 2020-09-03 PROCEDURE — 6370000000 HC RX 637 (ALT 250 FOR IP): Performed by: PHYSICIAN ASSISTANT

## 2020-09-03 PROCEDURE — 85730 THROMBOPLASTIN TIME PARTIAL: CPT

## 2020-09-03 PROCEDURE — 85610 PROTHROMBIN TIME: CPT

## 2020-09-03 PROCEDURE — 92610 EVALUATE SWALLOWING FUNCTION: CPT

## 2020-09-03 PROCEDURE — 84295 ASSAY OF SERUM SODIUM: CPT

## 2020-09-03 PROCEDURE — 80048 BASIC METABOLIC PNL TOTAL CA: CPT

## 2020-09-03 PROCEDURE — 2060000000 HC ICU INTERMEDIATE R&B

## 2020-09-03 PROCEDURE — 85025 COMPLETE CBC W/AUTO DIFF WBC: CPT

## 2020-09-03 PROCEDURE — 2580000003 HC RX 258: Performed by: INTERNAL MEDICINE

## 2020-09-03 PROCEDURE — 2580000003 HC RX 258: Performed by: PHYSICIAN ASSISTANT

## 2020-09-03 RX ORDER — GABAPENTIN 300 MG/1
300 CAPSULE ORAL 3 TIMES DAILY
Status: DISCONTINUED | OUTPATIENT
Start: 2020-09-03 | End: 2020-09-05 | Stop reason: HOSPADM

## 2020-09-03 RX ADMIN — DEXTROSE MONOHYDRATE: 50 INJECTION, SOLUTION INTRAVENOUS at 11:52

## 2020-09-03 RX ADMIN — MELATONIN 2000 UNITS: at 09:30

## 2020-09-03 RX ADMIN — GABAPENTIN 300 MG: 300 CAPSULE ORAL at 21:52

## 2020-09-03 RX ADMIN — CALCIUM 500 MG: 500 TABLET ORAL at 09:29

## 2020-09-03 RX ADMIN — DULOXETINE HYDROCHLORIDE 30 MG: 30 CAPSULE, DELAYED RELEASE ORAL at 08:12

## 2020-09-03 RX ADMIN — GABAPENTIN 300 MG: 300 CAPSULE ORAL at 08:12

## 2020-09-03 RX ADMIN — METOPROLOL TARTRATE 25 MG: 50 TABLET, FILM COATED ORAL at 21:52

## 2020-09-03 RX ADMIN — POTASSIUM CHLORIDE 40 MEQ: 1500 TABLET, EXTENDED RELEASE ORAL at 09:30

## 2020-09-03 RX ADMIN — ACYCLOVIR 400 MG: 400 TABLET ORAL at 21:52

## 2020-09-03 RX ADMIN — PANTOPRAZOLE SODIUM 40 MG: 40 TABLET, DELAYED RELEASE ORAL at 08:09

## 2020-09-03 RX ADMIN — ACYCLOVIR 400 MG: 400 TABLET ORAL at 08:09

## 2020-09-03 RX ADMIN — METOPROLOL TARTRATE 25 MG: 50 TABLET, FILM COATED ORAL at 09:29

## 2020-09-03 RX ADMIN — Medication 10 ML: at 08:25

## 2020-09-03 RX ADMIN — ATORVASTATIN CALCIUM 40 MG: 40 TABLET, FILM COATED ORAL at 21:52

## 2020-09-03 RX ADMIN — SODIUM CHLORIDE 15 ML: 900 IRRIGANT IRRIGATION at 16:40

## 2020-09-03 ASSESSMENT — PAIN SCALES - GENERAL
PAINLEVEL_OUTOF10: 0

## 2020-09-03 NOTE — PROGRESS NOTES
Pharmacy Note - Renal Dosing- Gabapentin    Gabapentin ordered on admission as home regimen of 300 mg TID; adjusted by pharmacy per renal dosing protocol to 300 mg daily. Renal function has improved. Will adjust back to 300 mg TID    Estimated CrCl ~ 34 ml/min (based on SCr 1.0)    Pharmacy will continue to monitor renal function and adjust dose as necessary. Please call with questions:  [phone] ([facility])    Kunal BAL   9/3/2020 2:31 PM

## 2020-09-03 NOTE — PROGRESS NOTES
Physician Progress Note      Amber Landeros  CSN #:                  [id number]  :                       1950  ADMIT DATE:       2020 12:39 PM  [address] [place] DATE:  RESPONDING  PROVIDER #:        Ann CROWLEY - CNP          QUERY TEXT:    Dear attending provider,    Patient admitted with hypernatremia and failure to thrive. Noted   documentation of Acute Kidney Injury in H&P to present. Creatinine 1.2 on   presentation and not meeting KDIGO criteria for WANDER. Per renal consult,   patient has a \"history of WANDER and renal function stable at this time. \"  Please   document in progress notes and discharge summary:    The medical record reflects the following:  Risk Factors: HTN, hypernatremia, PMH WANDER  Clinical Indicators: none documented to support WANDER  Treatment: Renal consult    Defined by Kidney Disease Improving Global Outcomes (KDIGO) clinical practice   guideline for acute kidney injury:  -Increase in SCr by ? 0.3 mg/dl within 48 hours; or  -Increase in SCr to ? 1.5 times baseline, which is known or presumed to have   occurred within the prior 7 days; or  -Urine volume < 0.5ml/kg/h for 6 hours  Options provided:  -- Acute kidney injury ruled out after study  -- Acute kidney injury evidenced by, Please document evidence. -- Other - I will add my own diagnosis  -- Disagree - Not applicable / Not valid  -- Disagree - Clinically unable to determine / Unknown  -- Refer to Clinical Documentation Reviewer    PROVIDER RESPONSE TEXT:    Acute kidney injury was ruled out after study.     Query created by: Juan A Kilpatrick on 9/3/2020 2:30 PM      Electronically signed by:  Neal Sims CNP 9/3/2020 3:03 PM

## 2020-09-03 NOTE — PLAN OF CARE
Problem: Mood - Altered:  Goal: Mood stable  Description: Mood stable  Outcome: Ongoing     Problem: Mood - Altered:  Goal: Mood stable  Description: Mood stable  Outcome: Ongoing   Pt seemed not to want to eat or take pills earlier this am. Talked with patient about doing a couple at a time. And taking full liquid diet a little at a time. Patient was able to get all pills down and able to eat small amounts of soup, juice, and italian ice with no issues. Will monitor.

## 2020-09-03 NOTE — ONCOLOGY
Dr. Rebecca Stack informed via perfect serve about patient refusing hs medications and sates she feels like she is choking upon taking pills. RN requested Acyclovir and Lopressor po switch to IV. No orders received @ this time for IV medication. RN also requested swallow evaluation and secured order for this evaluation.

## 2020-09-03 NOTE — CONSULTS
NUTRITION ASSESSMENT  Admission Date: 9/2/2020     Type and Reason for Visit: Consult    NUTRITION RECOMMENDATIONS:   This patient is severely malnourished and alternative means on nutrition should strongly be considered within 3-5 days of admission per ASPEN guidelines. 1. PO Diet: Continue current diet per SLP - full liquid diet and encourage high calorie/protein containing items. 2. ONS: Adding Ensure High Calorie TID. 3. Please consider addition of thiamin regimen as pt is malnourished and could pose high risk for refeeding. 4. Daily labs: Phos, K+, and Mg to monitor for signs of refeeding     NUTRITION ASSESSMENT:  RD consult for poor po/poor intake; RD triggered for high risk of malnutrition; low bmi indicating underweight. Newly dx MM; RN and RD discussed pt- asked to delay RD visit at this time. RD suspects worsening malnutrition given additional weight loss and poor po intake. RD adding ONS per RN discussion and will conduct direct pt interview as appropriate. RD monitoring closely. Due to current CDC guidelines recommending 6-ft distancing for social isolation for COVID19 prevention, physical aspects of the malnutrition assessment were withheld at this time.    MALNUTRITION ASSESSMENT  Context of Malnutrition: Chronic Illness   Malnutrition Status: Severe malnutrition  Findings of the 6 clinical characteristics of malnutrition (Minimum of 2 out of 6 clinical characteristics is required to make the diagnosis of moderate or severe Protein Calorie Malnutrition based on AND/ASPEN Guidelines):  Energy Intake: Less than/equal to 50% of estimated energy requirements    Energy Intake Time: Greater than or equal to 7 days    Weight Loss %: 20% loss or greater    Weight loss Time: Greater than or equal to 3 months   Body Fat Loss: Unable to Assess    Body Fat Location: Triceps per visual    Body Muscle Loss: Unable to Assess   Body Muscle Loss Location: Clavicles  and Temples per visual    Fluid Accumulation: No significant    Fluid Accumulation Location: No significant     Strength: Not Performed; Not Measured     NUTRITION DIAGNOSIS   Problem: Problem #1: Underweight  Etiology: Catabolic Illness  Signs & Symptoms: BMI, Diet history of poor intake , Intake 0-25% and Weight loss     NUTRITION INTERVENTION  Food and/or Nutrient Delivery:Continue Current diet  or Start ONS   Nutrition education/counseling/coordination of care: Continue Inpatient Monitoring  or Speech Therapy    NUTRITION MONITORING & EVALUATION:  Evaluation:Goals set   Goals:Goals: pt will improve nutrition status by improving PO intake to consume greater than 50 % meals and supplements.    Monitoring: Chewing/Swallowing , Diet Tolerance , I/O, Meal Intake  or Supplement Intake      OBJECTIVE DATA:  · Nutrition-Focused Physical Findings: appears cachectic; lbm pta;   · Wounds None      Past Medical History:   Diagnosis Date    Allergic rhinitis     Anemia     CAD (coronary artery disease)     CAD (coronary artery disease)     Diverticulosis     ESRD (end stage renal disease) on dialysis ([facility] [address] 4/24/2020    GERD (gastroesophageal reflux disease)     History of colonoscopy 2002    Hyperlipidemia     Hypertension     Myocardial infarction, inferior wall ([facility] [address] January 2006        ANTHROPOMETRICS  Current Height: 5' 5\" (165.1 cm)  Current Weight: 100 lb 5 oz (45.5 kg)    Admission weight: 101 lb 3.1 oz (45.9 kg)  Ideal Bodyweight 125 lb    Usual Bodyweight ~130 lb per emr   Weight Changes -28 lb (21%) loss in 3 mo        BMI BMI (Calculated): 16.7    Wt Readings from Last 50 Encounters:   09/04/20 100 lb 5 oz (45.5 kg)   08/28/20 101 lb 3.2 oz (45.9 kg)   08/14/20 109 lb 5.6 oz (49.6 kg)   07/28/20 114 lb 9.6 oz (52 kg)   06/03/20 129 lb (58.5 kg)   05/26/20 131 lb 6.4 oz (59.6 kg)   04/07/20 120 lb 5.9 oz (54.6 kg)   03/30/20 127 lb (57.6 kg)   01/28/19 150 lb (68 kg)   07/26/18 149 lb (67.6 kg)   08/10/17 125 lb (56.7 kg)   08/03/17

## 2020-09-03 NOTE — PLAN OF CARE
Problem: Falls - Risk of:  Goal: Will remain free from falls  Description: Will remain free from falls  9/3/2020 1426 by La Nena Ortiz RN  Outcome: Ongoing   Pt up with assist to bed side commode. Pt refuse to get up to chair. Pt repositioned today with pillow support. Bed alarm in use. Call light within reach. Will monitor. Problem: Venous Thromboembolism:  Goal: Will show no signs or symptoms of venous thromboembolism  Description: Will show no signs or symptoms of venous thromboembolism  9/3/2020 1426 by La Nena Ortiz RN  Outcome: Ongoing   Adherent with DVT Prevention: Pt is at risk for DVT d/t decreased mobility and cancer treatment. Pt educated on importance of activity. Pt has orders for Subcut prophylactic lovenox. Pt verbalizes understanding of need for prophylaxis while inpatient. Problem: Nausea/Vomiting:  Goal: Absence of nausea/vomiting  Description: Absence of nausea/vomiting  9/3/2020 1426 by La Nena Ortiz RN  Outcome: Ongoing   Pt denies nausea at this time. Pt able to eat small amounts of soup and smoothy. Pt had swallow evaluation this am. Pt able to do pills with ice cream and water.

## 2020-09-03 NOTE — PROGRESS NOTES
[address] Progress Note    9/3/2020     Maria Fernanda Goodson    MRN: [id number]    : 1950    Referring MD: Moises Cota., DO  [facility]  [place] [address]  [place], [address]      SUBJECTIVE:  Patient is refusing to take oral medications as well as eat    ECOG PS:  (2) Ambulatory and capable of self care, unable to carry out work activity, up and about > 50% or waking hours    KPS: 70% Cares for self; unable to carry on normal activity or to do active work    Isolation: None    Medications    Scheduled Meds:   acyclovir  400 mg Oral BID    atorvastatin  40 mg Oral Nightly    calcium elemental  500 mg Oral Daily    Vitamin D  2,000 Units Oral Daily    metoprolol tartrate  25 mg Oral BID    DULoxetine  30 mg Oral Daily    gabapentin  300 mg Oral Daily    pantoprazole  40 mg Oral QAM AC    potassium chloride  40 mEq Oral Daily with breakfast    sodium chloride flush  10 mL Intravenous 2 times per day    Saline Mouthwash  15 mL Swish & Spit 4x Daily AC & HS    enoxaparin  40 mg Subcutaneous Daily     Continuous Infusions:   IV infusion builder 75 mL/hr at 20 1403    sodium chloride      dextrose 75 mL/hr at 20 2229     PRN Meds:.acetaminophen, ondansetron, sodium chloride, sodium chloride flush, potassium chloride, magnesium sulfate, magnesium hydroxide, Saline Mouthwash, alteplase, ondansetron    ROS:  As noted above, otherwise remainder of 10-point ROS negative    Physical Exam:     I&O:      Intake/Output Summary (Last 24 hours) at 9/3/2020 0801  Last data filed at 9/3/2020 0538  Gross per 24 hour   Intake 1226 ml   Output 200 ml   Net 1026 ml       Vital Signs:  /85   Pulse 94   Temp 97.9 °F (36.6 °C) (Oral)   Resp 18   Ht 5' 5\" (1.651 m)   Wt 94 lb 9.2 oz (42.9 kg)   SpO2 98%   BMI 15.74 kg/m²     Weight:    Wt Readings from Last 3 Encounters:   20 94 lb 9.2 oz (42.9 kg)   20 101 lb 3.2 oz (45.9 kg)   20 109 lb 5.6 oz (49.6 kg) General: Awake, alert and oriented. HEENT: normocephalic, PERRL, no scleral erythema or icterus, Oral mucosa moist and intact, throat clear  NECK: supple without palpable adenopathy  BACK: Straight negative CVAT  SKIN: warm dry and intact without lesions rashes or masses  CHEST: CTA bilaterally without use of accessory muscles  CV: Normal S1 S2, RRR, no MRG  ABD: NT ND normoactive BS, no palpable masses or hepatosplenomegaly  EXTREMITIES: without edema, denies calf tenderness  NEURO: CN II - XII grossly intact  CATHETER: PIV    Data    CBC:   Recent Labs     09/02/20  1344   WBC 1.5*   HGB 9.2*   HCT 28.9*   MCV 87.7   PLT 53*     BMP/Mag:  Recent Labs     09/02/20  1344 09/02/20  2050 09/03/20  0523   * 155* 152*   K 4.0  --  3.6   *  --  119*   CO2 20*  --  19*   PHOS 3.4  --   --    BUN 22*  --  18   CREATININE 1.2  --  1.0   MG 1.20*  --  2.60*     LIVP:   Recent Labs     09/02/20  1344   AST 9*   ALT 10   BILIDIR <0.2   BILITOT 0.4   ALKPHOS 80     Coags:   Recent Labs     09/02/20  1344 09/03/20  0523   PROTIME 14.0* 12.7   INR 1.20* 1.09   APTT 27.1 27.5     Uric Acid   Recent Labs     09/02/20  1344   LABURIC 5.9       PROBLEM LIST:             1. Multiple Myeloma  2. Hypernatremia   3. WANDER       TREATMENT:            1. Velcade/Dex (C1D1 4/1/2020)  - Cytoxan added 6/12/20  2. Ai/Estelle/Dex (8/13/20)          ASSESSMENT AND PLAN:           1. Multiple Myeloma: stage III by ISS, relapsed refractory   - S/p CyBorD with relapsed noted on liver biopsy 8/11/20      Plan: Ai/Estelle/Dex (C1D1 8/13/20)    Cycle 1 Day 22 today - ai/dex/estelle (renally dosed) - changed dex to weekly, not twice weekly       2. ID: afebrile   - Cont acyclovir ppx  - Blood cultures 8/18/20: 1/2 positive for staph epi; s/p a course of Linezolid     3. Heme:  Anemia r/t disease & CKD; panyctopenia d/t chemotx     - Transfuse for Hgb < 7 and Platelets < 09 K.  - Reticrit weekly     4.  Metabolic: Admitted with WANDER & hypernatremia   - Followed by Nephrology (Melania Reid). Consult on Admission.  - Cont KCl 40 mEq daily   - Cont D5 @ 75ml/hr      5.  Nutrition: poor PO intake, difficulty with pills  - Cont low microbial diet  - Consult dietician on admit   - Zofran 8mg prn for nausea  - Cont PPI daily   -SLP consult     6. Psych  - Cont Cymbalta 30mg daily     7. Neuropathy  - Gabapentin 300 TID          - DVT Prophylaxis: Platelets <44,885 cells/dL - prophylactic lovenox on hold and mechanical prophylaxis with bilateral SCDs while in bed in place. Contraindications to pharmacologic prophylaxis: Thrombocytopenia  Contraindications to mechanical prophylaxis: None    - Disposition: When NA and oral intake improved      KEO Del Real - CNP       Tod Carrel. Felicita Gustafson DO, MS  Oncology[facility]ology [facility]    Please contact via:  1. [employer]  2.   Cell Phone:  [phone]    9/3/2020   4:53 PM

## 2020-09-03 NOTE — CARE COORDINATION
Case Management Assessment           Initial Evaluation                Date / Time of Evaluation: 9/3/2020 8:01 AM                 Assessment Completed by: Tristen Cabrera    Patient Name: Dewane Fleischer     YOB: 1950  Diagnosis: WANDER (acute kidney injury) [facility]) [N17.9]     Date / Time: 9/2/2020 12:39 PM    Patient Admission Status: Inpatient    If patient is discharged prior to next notation, then this note serves as note for discharge by case management. Current PCP: Rogelio Schaffer MD  Clinic Patient: No    Chart Reviewed: Yes  Patient/ Family Interviewed: Yes    Initial assessment completed at bedside with: patient during last admission    Hospitalization in the last 30 days: Yes    Emergency Contacts:  Extended Emergency Contact Information  Primary Emergency Contact: Miguel Castelanvard of [address] [place] [address] Phone: [phone]  Relation: Spouse  Secondary Emergency Contact: Doug Paul of [address] [place] [address] Phone: [phone]  Mobile Phone: [phone]  Relation: Child    Advance Directives:   Code Status: Full Code    Healthcare Power of : No      Financial  Payor: Laurey Sandhoff / Plan: [place] / Product Type: *No Product type* /     Pre-cert required for SNF: Yes    [id number] [facility],[address] [place], [place] [id number]  [address] [place] [address]  Phone: [phone] Fax: [phone]      Potential assistance Purchasing Medications: Potential Assistance Purchasing Medications: No  Does Patient want to participate in local refill/ meds to beds program?: No    Meds To Beds General Rules:  1. Can ONLY be done Monday- Friday between 8:30am-5pm  2. Prescription(s) must be in pharmacy by 3pm to be filled same day  3. Copy of patient's insurance/ prescription drug card and patient face sheet must be sent along with the prescription(s)  4. Cost of Rx cannot be added to hospital bill.  If financial assistance is needed, please contact unit  or ;  or  CANNOT provide pharmacy voucher for patients co-pays  5. Patients can then  the prescription on their way out of the hospital at discharge, or pharmacy can deliver to the bedside if staff is available. (payment due at time of pick-up or delivery - cash, check, or card accepted)     Able to afford home medications/ co-pay costs: Yes    ADLS  Support Systems: Spouse/Significant Other, Children, [employer]    PT AM-PAC:   /24  OT AM-PAC:   /24    [place]: home with spouse  Steps: 6 steps to enter    Plans to RETURN to current housing: Yes  Barriers to RETURNING to current housing: medical clearance    Home Care Information  Currently ACTIVE with [address]: Yes  [address]: [address]   Phone: [phone]  Fax: [phone]    Currently ACTIVE with [employer] [facility] [place] [facility] [place] on Aging: No  Passport/ Waiver: No  Passport/ Waiver Services: Not Applicable      Durable Medical Equipment  DME Provider:   Equipment:     Home Oxygen and [address] prior to admission: No  [address]: Not Applicable  Other Respiratory Equipment: none    Informed of need to bring portable home O2 tank on day of DISCHARGE for nursing to connect prior to leaving: No  Verbalized agreement/Understanding: No  Person to bring portable tank at discharge: none    Dialysis  Active with HD/PD prior to admission: No  Nephrologist: Dr. Milligan Quivers:  Not Applicable    DISCHARGE PLAN:  Disposition: Home with [address]: [address]      Transportation PLAN for discharge: family     Factors facilitating achievement of predicted outcomes: Family support, Motivated and Cooperative    Barriers to discharge: Medical complications     Additional Case Management Notes: Patient was admitted from [facility] after being found to have increased weakness, poor PO intake, and hypernatremic. She is from home with her . She is typically independent at baseline. During her last admission, she discharged home with home health care through [facility]. Possible need for home care at discharge again. SW to follow for potential discharge planning needs. The Plan for Transition of Care is related to the following treatment goals of WANDER (acute kidney injury) ([facility] [N17.9]    The Patient and/or patient representative Hasmukh Millan and her family were provided with a choice of provider and agrees with the discharge plan Yes    Freedom of choice list was provided with basic dialogue that supports the patient's individualized plan of care/goals and shares the quality data associated with the providers.  Not Indicated    Care Transition patient: Yes    Blayne Fish, [address]   Case Management Department  Ph: [phone]   Fax: [phone]

## 2020-09-03 NOTE — PROGRESS NOTES
Speech Language Pathology  Facility/Department: [address]   CLINICAL BEDSIDE SWALLOW EVALUATION    NAME: Adriana Chand  : 1950  MRN: [id number]    ADMISSION DATE: 2020  ADMITTING DIAGNOSIS: has HTN (hypertension); CAD (coronary artery disease); Hyperlipidemia; Cerebral infarction ([address] GERD (gastroesophageal reflux disease); Anemia due to bone marrow failure ([address] Porcelain gallbladder; Diverticulitis of large intestine without perforation or abscess with bleeding; Iron deficiency anemia; Multiple myeloma not having achieved remission ([address] ESRD (end stage renal disease) on dialysis ([facility] and WANDER (acute kidney injury) ([facility] on their problem list.  ONSET DATE: 2020    Recent Chest Xray 2020  No acute pulmonary pathology         Bony lesions consistent with patient's history of multiple myeloma           Date of Eval: 9/3/2020  Evaluating Therapist: Nora Jay    Current Diet level:  Current Diet : Regular  Current Liquid Diet : Thin    Primary Complaint  Patient Complaint: Pt denies trouble swallowing, though told RN she was  having trouble    Pain:  Pain Assessment: No pain, but requesting to rest and sleep    Reason for Referral  Adriana Chand was referred for a bedside swallow evaluation to assess the efficiency of her swallow function, identify signs and symptoms of aspiration and make recommendations regarding safe dietary consistencies, effective compensatory strategies, and safe eating environment. History of Present Illness:   Per MD notes:  Kinjal Wang is a 65year old female who originally presented to the ED on 3/30/20 with a one week history of nausea, vomiting and diarrhea. She was found to have an WANDER (SCr 9.2), hypercalcemia, elevated protein and lytic lesions on CT.  She underwent a bone marrow biopsy on  3/31/20, which showed clustered plasma cells accounting for about 80-90% of cells with sheets of plasma cells effacing the normal marrow presented with applesauce, pt expectorated material, stated she doesn't like applesauce. pt then presented with jello and started gagging when the jello placed in mouth (did not swallow). Pt then expectorated this as well as secretions. allowed pt time and then presented with further trials of water and jello. pt consumed with no overt s/s of aspiration. Swallow movement felt upon palpation of anterior neck. Pt consumed medications per RN, in ice cream followed by water and another dissolved in water. Pt able to swallow and clear oral cavity with medications. Pt declined solid texture trials. Recommend full liquids with close monitoring for s/s of aspiration. If any emerge, make NPO and notify SLP, to determine diet modification vs instrumental exam.  At this time, question whether pt would be able to participate fully in MBS. Dysphagia Outcome Severity Scale: Level 5: Mild dysphagia- Distant supervision. May need one diet consistency restricted     Treatment Plan  Requires SLP Intervention: Yes  Duration/Frequency of Treatment: 3-5 x  D/C Recommendations: To be determined     Recommended Diet and Intervention  Liquid Consistency Recommendation: Full  Recommended Form of Meds: Meds in puree  Therapeutic Interventions: Diet tolerance monitoring;Oral care; Patient/Family education    Compensatory Swallowing Strategies   Upright as possible for all oral intake  Eat/Feed slowly  Remain upright for 30-45 minutes after meals  Assist feed  Small bites/sips    Treatment/Goals  Pt to be seen to address the following goals:   1- The patient will tolerate recommended diet without observed clinical signs of aspiration    2-The patient/caregiver will demonstrate understanding of dysphagia recommendations/ compensatory strategies for improved swallowing safety.   9/3: Educated pt to purpose of visit, s/s of aspiration, concern if aspiration occurs, rationale for diet recommendation/strategies to reduce risk for aspiration and instruction to notify staff if any signs emerge. Pt asking to be left alone to sleep, appears to understand rationale for evaluation  Cont goal    General  Chart Reviewed: Yes  Behavior/Cognition: Alert  Respiratory Status: O2 via nasual cannula  Communication Observation: Functional  Follows Directions: Simple  Dentition: Some missing teeth  Patient Positioning: Upright in bed  Baseline Vocal Quality: Weak  Volitional Cough: Weak  Prior Dysphagia History: none  Consistencies Administered: Ice Chips; Thin - cup; Thin - straw; Thin - teaspoon;Dysphagia Pureed (Dysphagia I)    Vision/Hearing  Vision  Vision: (kept eyes closed throughout assessment)  Hearing  Hearing: Within functional limits    Oral Motor Deficits  Oral/Motor  Oral Motor: Within functional limits    Oral Phase Dysfunction  Pt intermittently required cues to open mouth fully when presenting PO and to close lips around spoon and straw     Indicators of Pharyngeal Phase Dysfunction  Pt analyzed with ice chips, water via tsp, cup and straw as well as ice cream, applesauce and jello. Pt required assistance keeping head in upright neutral position. Pt exhibited no overt signs of aspiration with any trials. However when initially presented with applesauce, pt expectorated material, stated she doesn't like applesauce. pt then presented with jello and started gagging when the jello placed in mouth (did not swallow). Pt then expectorated this as well as secretions. allowed pt time and then presented with further trials of water and jello. pt consumed with no overt s/s of aspiration. Swallow movement felt upon palpation of anterior neck. Pt consumed medications per RN, in ice cream followed by water and another dissolved in water. Pt able to swallow and clear oral cavity with medications.       Prognosis  Prognosis  Prognosis for safe diet advancement: fair  Barriers to reach goals: (medical status)  Individuals consulted  Consulted and agree with results and recommendations: Patient;RN    Education  Patient Education: Pt educated to purpose of visit  Patient Education Response: Needs reinforcement  Safety Devices in place: Yes  Type of devices: Call light within reach       Therapy Time  SLP Individual Minutes  Time In: 0740  Time Out: 0810  Minutes: 30     Plan:  Recommended diet: full liquids -if any s/s of aspiration emerge, or there is respiratory decline,  make NPO until further evaluated by SLP  Dc recommendation: TBD   Pt therapy goal: not stated  Pt dc goal: not stated    Nikos Silvestre M.S./[facility]SLP #[id number]  Pg.  # [phone]  Needs met prior to leaving room, call light within reach, d/w RN Janet Tamayo  This document will serve as a dc summary if pt dc prior to next visit  9/3/2020 9:11 AM

## 2020-09-04 ENCOUNTER — APPOINTMENT (OUTPATIENT)
Dept: GENERAL RADIOLOGY | Age: 70
DRG: 640 | End: 2020-09-04
Attending: INTERNAL MEDICINE
Payer: COMMERCIAL

## 2020-09-04 PROBLEM — N17.9 AKI (ACUTE KIDNEY INJURY) (HCC): Status: RESOLVED | Noted: 2020-09-02 | Resolved: 2020-09-04

## 2020-09-04 LAB
ALBUMIN SERPL-MCNC: 2.9 G/DL (ref 3.4–5)
ALP BLD-CCNC: 68 U/L (ref 40–129)
ALT SERPL-CCNC: 9 U/L (ref 10–40)
ANION GAP SERPL CALCULATED.3IONS-SCNC: 9 MMOL/L (ref 3–16)
ANISOCYTOSIS: ABNORMAL
AST SERPL-CCNC: 10 U/L (ref 15–37)
BASOPHILS ABSOLUTE: 0 K/UL (ref 0–0.2)
BASOPHILS RELATIVE PERCENT: 1 %
BILIRUB SERPL-MCNC: 0.3 MG/DL (ref 0–1)
BILIRUBIN DIRECT: <0.2 MG/DL (ref 0–0.3)
BILIRUBIN, INDIRECT: ABNORMAL MG/DL (ref 0–1)
BUN BLDV-MCNC: 12 MG/DL (ref 7–20)
BURR CELLS: ABNORMAL
CALCIUM SERPL-MCNC: 7.9 MG/DL (ref 8.3–10.6)
CHLORIDE BLD-SCNC: 113 MMOL/L (ref 99–110)
CO2: 20 MMOL/L (ref 21–32)
CREAT SERPL-MCNC: 0.8 MG/DL (ref 0.6–1.2)
EOSINOPHILS ABSOLUTE: 0.1 K/UL (ref 0–0.6)
EOSINOPHILS RELATIVE PERCENT: 10 %
GFR AFRICAN AMERICAN: >60
GFR NON-AFRICAN AMERICAN: >60
GLUCOSE BLD-MCNC: 104 MG/DL (ref 70–99)
HCT VFR BLD CALC: 23 % (ref 36–48)
HEMOGLOBIN: 7.3 G/DL (ref 12–16)
LACTATE DEHYDROGENASE: 285 U/L (ref 100–190)
LYMPHOCYTES ABSOLUTE: 0.3 K/UL (ref 1–5.1)
LYMPHOCYTES RELATIVE PERCENT: 30 %
MCH RBC QN AUTO: 27.8 PG (ref 26–34)
MCHC RBC AUTO-ENTMCNC: 31.7 G/DL (ref 31–36)
MCV RBC AUTO: 87.7 FL (ref 80–100)
MONOCYTES ABSOLUTE: 0 K/UL (ref 0–1.3)
MONOCYTES RELATIVE PERCENT: 3 %
NEUTROPHILS ABSOLUTE: 0.6 K/UL (ref 1.7–7.7)
NEUTROPHILS RELATIVE PERCENT: 56 %
OVALOCYTES: ABNORMAL
PDW BLD-RTO: 20.8 % (ref 12.4–15.4)
PHOSPHORUS: 1.8 MG/DL (ref 2.5–4.9)
PLATELET # BLD: 35 K/UL (ref 135–450)
PLATELET SLIDE REVIEW: ABNORMAL
PMV BLD AUTO: 9.6 FL (ref 5–10.5)
POTASSIUM SERPL-SCNC: 3.4 MMOL/L (ref 3.5–5.1)
RBC # BLD: 2.62 M/UL (ref 4–5.2)
SCHISTOCYTES: ABNORMAL
SODIUM BLD-SCNC: 139 MMOL/L (ref 136–145)
SODIUM BLD-SCNC: 142 MMOL/L (ref 136–145)
TARGET CELLS: ABNORMAL
TEAR DROP CELLS: ABNORMAL
TOTAL PROTEIN: 5.1 G/DL (ref 6.4–8.2)
URIC ACID, SERUM: 5.2 MG/DL (ref 2.6–6)
WBC # BLD: 1 K/UL (ref 4–11)

## 2020-09-04 PROCEDURE — 80076 HEPATIC FUNCTION PANEL: CPT

## 2020-09-04 PROCEDURE — 84100 ASSAY OF PHOSPHORUS: CPT

## 2020-09-04 PROCEDURE — 2580000003 HC RX 258: Performed by: INTERNAL MEDICINE

## 2020-09-04 PROCEDURE — 84295 ASSAY OF SERUM SODIUM: CPT

## 2020-09-04 PROCEDURE — U0002 COVID-19 LAB TEST NON-CDC: HCPCS

## 2020-09-04 PROCEDURE — 80048 BASIC METABOLIC PNL TOTAL CA: CPT

## 2020-09-04 PROCEDURE — 6370000000 HC RX 637 (ALT 250 FOR IP): Performed by: PHYSICIAN ASSISTANT

## 2020-09-04 PROCEDURE — 85025 COMPLETE CBC W/AUTO DIFF WBC: CPT

## 2020-09-04 PROCEDURE — 74230 X-RAY XM SWLNG FUNCJ C+: CPT

## 2020-09-04 PROCEDURE — 2580000003 HC RX 258: Performed by: NURSE PRACTITIONER

## 2020-09-04 PROCEDURE — 83615 LACTATE (LD) (LDH) ENZYME: CPT

## 2020-09-04 PROCEDURE — 2060000000 HC ICU INTERMEDIATE R&B

## 2020-09-04 PROCEDURE — 6370000000 HC RX 637 (ALT 250 FOR IP): Performed by: NURSE PRACTITIONER

## 2020-09-04 PROCEDURE — 92526 ORAL FUNCTION THERAPY: CPT

## 2020-09-04 PROCEDURE — U0003 INFECTIOUS AGENT DETECTION BY NUCLEIC ACID (DNA OR RNA); SEVERE ACUTE RESPIRATORY SYNDROME CORONAVIRUS 2 (SARS-COV-2) (CORONAVIRUS DISEASE [COVID-19]), AMPLIFIED PROBE TECHNIQUE, MAKING USE OF HIGH THROUGHPUT TECHNOLOGIES AS DESCRIBED BY CMS-2020-01-R: HCPCS

## 2020-09-04 PROCEDURE — 99221 1ST HOSP IP/OBS SF/LOW 40: CPT | Performed by: NURSE PRACTITIONER

## 2020-09-04 PROCEDURE — 2500000003 HC RX 250 WO HCPCS: Performed by: NURSE PRACTITIONER

## 2020-09-04 PROCEDURE — 2580000003 HC RX 258: Performed by: PHYSICIAN ASSISTANT

## 2020-09-04 PROCEDURE — 92611 MOTION FLUOROSCOPY/SWALLOW: CPT

## 2020-09-04 PROCEDURE — 6360000002 HC RX W HCPCS: Performed by: NURSE PRACTITIONER

## 2020-09-04 PROCEDURE — 84550 ASSAY OF BLOOD/URIC ACID: CPT

## 2020-09-04 RX ORDER — MORPHINE SULFATE 100 MG/5ML
10 SOLUTION ORAL
Qty: 30 ML | Refills: 0 | Status: SHIPPED | OUTPATIENT
Start: 2020-09-04 | End: 2020-09-11

## 2020-09-04 RX ORDER — LORAZEPAM 2 MG/ML
1 CONCENTRATE ORAL EVERY 4 HOURS PRN
Qty: 30 ML | Refills: 0 | Status: SHIPPED | OUTPATIENT
Start: 2020-09-04 | End: 2020-09-05 | Stop reason: HOSPADM

## 2020-09-04 RX ADMIN — ACYCLOVIR 400 MG: 400 TABLET ORAL at 08:26

## 2020-09-04 RX ADMIN — ONDANSETRON 8 MG: 2 INJECTION INTRAMUSCULAR; INTRAVENOUS at 23:08

## 2020-09-04 RX ADMIN — SODIUM CHLORIDE 15 ML: 900 IRRIGANT IRRIGATION at 15:35

## 2020-09-04 RX ADMIN — Medication 10 ML: at 20:58

## 2020-09-04 RX ADMIN — MELATONIN 2000 UNITS: at 08:26

## 2020-09-04 RX ADMIN — GABAPENTIN 300 MG: 300 CAPSULE ORAL at 14:08

## 2020-09-04 RX ADMIN — DULOXETINE HYDROCHLORIDE 30 MG: 30 CAPSULE, DELAYED RELEASE ORAL at 08:26

## 2020-09-04 RX ADMIN — GABAPENTIN 300 MG: 300 CAPSULE ORAL at 08:25

## 2020-09-04 RX ADMIN — Medication 10 ML: at 08:27

## 2020-09-04 RX ADMIN — DEXTROSE MONOHYDRATE: 50 INJECTION, SOLUTION INTRAVENOUS at 01:05

## 2020-09-04 RX ADMIN — POTASSIUM PHOSPHATE, MONOBASIC POTASSIUM PHOSPHATE, DIBASIC 30 MMOL: 224; 236 INJECTION, SOLUTION, CONCENTRATE INTRAVENOUS at 09:37

## 2020-09-04 RX ADMIN — POTASSIUM BICARBONATE 40 MEQ: 782 TABLET, EFFERVESCENT ORAL at 08:23

## 2020-09-04 RX ADMIN — METOPROLOL TARTRATE 25 MG: 50 TABLET, FILM COATED ORAL at 08:26

## 2020-09-04 RX ADMIN — SODIUM CHLORIDE 15 ML: 900 IRRIGANT IRRIGATION at 12:51

## 2020-09-04 RX ADMIN — CALCIUM 500 MG: 500 TABLET ORAL at 08:25

## 2020-09-04 RX ADMIN — PANTOPRAZOLE SODIUM 40 MG: 40 TABLET, DELAYED RELEASE ORAL at 06:28

## 2020-09-04 ASSESSMENT — PAIN SCALES - GENERAL
PAINLEVEL_OUTOF10: 0

## 2020-09-04 NOTE — PLAN OF CARE
Problem: Falls - Risk of:  Goal: Will remain free from falls  Description: Will remain free from falls  9/4/2020 0124 by Tay Mcdonald RN  Outcome: Ongoing  Note: Pt is a High fall risk. See Estel Clement Fall Score and ABCDS Injury Risk assessments.   + Screening for Orthostasis and/or + High Fall Risk per VILLAGRAN/ABCDS: Explained fall risk precautions to pt and family and rationale behind their use to keep the patient safe. Pt bed is in low position, side rails up, call light and belongings are in reach. Fall wristband applied and present on pts wrist.  Bed alarm on. Pt encouraged to call for assistance. Will continue with hourly rounds for PO intake, pain needs, toileting and repositioning as needed. Problem: Venous Thromboembolism:  Goal: Will show no signs or symptoms of venous thromboembolism  Description: Will show no signs or symptoms of venous thromboembolism  9/4/2020 0124 by Tay Mcdonald RN  Outcome: Ongoing     Problem: Nausea/Vomiting:  Goal: Absence of nausea/vomiting  Description: Absence of nausea/vomiting  9/4/2020 0124 by Tay Mcdonald RN  Outcome: Ongoing  Note: Pt denies any nausea at this time. Problem: Mood - Altered:  Goal: Mood stable  Description: Mood stable  9/4/2020 0124 by Tay Mcdonald RN  Outcome: Ongoing  Note: Pt has slept this shift.

## 2020-09-04 NOTE — PLAN OF CARE
Problem: Falls - Risk of:  Goal: Will remain free from falls  Description: Will remain free from falls  9/3/2020 1426 by Martha Beth RN  Outcome: Ongoing   Pt up with assist to bed side commode. Pt refuse to get up to chair. Pt repositioned today with pillow support. Bed alarm in use. Call light within reach. Will monitor. Problem: Venous Thromboembolism:  Goal: Will show no signs or symptoms of venous thromboembolism  Description: Will show no signs or symptoms of venous thromboembolism  9/3/2020 1426 by Martha Beth RN  Outcome: Ongoing   Adherent with DVT Prevention: Pt is at risk for DVT d/t decreased mobility and cancer treatment. Pt educated on importance of activity. Pt has orders for SCDs while in bed. Pt verbalizes understanding of need for prophylaxis while inpatient. Problem: Nausea/Vomiting:  Goal: Absence of nausea/vomiting  Description: Absence of nausea/vomiting  9/3/2020 1426 by Martha Beth RN  Outcome: Ongoing   Pt denies nausea at this time. Pt able to drink half of an ensure today. Pt had swallow evaluation this am. Pt switched to general diet. Pt able to do pills with water. Will monitor.

## 2020-09-04 NOTE — PROGRESS NOTES
Patient attempted to urinate and was not able to void. Patient was bladder scanned and it showed 200ml. MD Quintin Ratliff notified of patient not having any urine output this shift. MD Quintin Ratliff ordered for IVF to be increased from 40ml/hr to 75ml/hr. Attempted multiple times this shift to get patient to eat and drink ensures- she took 3 sips of an ensure for me.

## 2020-09-04 NOTE — PROCEDURES
INSTRUMENTAL SWALLOW REPORT  MODIFIED BARIUM SWALLOW / Discharge    NAME: Cecelia Sorenson   : 1950  MRN: [id number]       Date of Eval: 2020     Ordering Physician: Tay Bucio NP  Radiologist: Kiera Schumacher  Referring Diagnosis: oropharyngeal dysphagia    Past Medical History:  has a past medical history of Allergic rhinitis, Anemia, CAD (coronary artery disease), CAD (coronary artery disease), Diverticulosis, ESRD (end stage renal disease) on dialysis ([address] GERD (gastroesophageal reflux disease), History of colonoscopy, Hyperlipidemia, Hypertension, and Myocardial infarction, inferior wall ([address] Past Surgical History:  has a past surgical history that includes  section (x3); Hysterectomy; Tympanoplasty; Breast reduction surgery ([place]); CT BIOPSY BONE MARROW (2020); and CT NEEDLE BIOPSY LIVER PERCUTANEOUS (2020). Current Diet Solid Consistency: NPO  Current Diet Liquid Consistency: Full    Date of Prior Study: None  Type of Study: Initial MBS  Results of Prior Study: N/A      Patient Complaints/Reason for Referral:  Cecelia Sorenson was referred for a MBS to assess the efficiency of her swallow function, assess for aspiration, and to make recommendations regarding safe dietary consistencies, effective compensatory strategies, and safe eating environment. Patient complaints: none re: dysphagia; not wanting much PO    Onset of problem:   Date of Onset: 20  General Comment  Comments: Pt admitted 20 s/p WANDER. PMHx significant for MM, GERD, and allergic rhinitis. Pt reported to have coughing while swallowing medication per RN - BSE completed yesterday indicated difficulty with posterior transit, gagging, and reduced acceptance of solids. Full liquid diet recommended.  Pt seen this AM by SLP and pt accepted minimal trials with coughing exhibited on mixed consistency - MBS recommended for full assessment of swallow although concern for ability to complete procedure given pt's limited PO intake. Subjective  Subjective: Pt with minimal to no verbalizations t/o, responding \"mmhmm\" or \"ok. \" Pt expressed significant dislike of barium and took only very small boluses. Behavior/Cognition/Vision/Hearing:  Vision: Impaired  Vision Exceptions: Wears glasses at all times  Hearing: Within functional limits    Impressions:  Oral Phase: Pt with significant dislike of barium and reduced desire to take PO - heavy encouragement required for pt to take puree and solid trials. Slowed mastication as well as reduced posterior propulsion with lingual rocking exhibited with these textures but appeared to be d/t taste vs true oral dysphagia. Bolus control WFL and although slowed, mastication functional. Oral prep WFL for liquids. Pharyngeal: WFL. No penetration or aspiration observed with any consistency. No appreciable pharyngeal residue. Adequate hyolaryngeal elevation and excursion with good base of tongue contact against posterior pharyngeal wall for all trials. Dysphagia Outcome Severity Scale: Level 6: Within functional limits/Modified independence  Penetration-Aspiration Scale (PAS): 1 - Material does not enter the airway    Treatment Dx and ICD 10: oropharyngeal dysphagia R13.12   Patient Position: Lateral and Patient Degrees: 90  Consistencies Administered: Reg solid; Thin teaspoon; Thin straw; Thin cup;Dysphagia Pureed (Dysphagia I)  Compensatory Swallowing Strategies Attempted: Upright as possible for all oral intake  Postural Changes and/or Swallow Maneuvers Trialed: Upright    Recommended Diet:  Solid consistency: Regular(to allow pt to select preferred items given poor PO intake)  Liquid consistency:  Thin  Liquid administration via: Spoon;Cup;Straw  Medication administration: PO(per pt preference)    Safe Swallow Protocol:  Supervision: Close(most likely will require encouragement and assist for adequate intake)  Compensatory Swallowing Strategies: Upright as possible for all oral intake;Remain upright for 30-45 minutes after meals;Small bites/sips      Recommendations/Treatment  Requires SLP Intervention: No  D/C Recommendations: Home with intermittent assistance  Postural Changes and/or Swallow Maneuvers: Upright  No further dysphagia f/u recommended - oral and pharyngeal phases appear intact. Pt's difficulty with PO most likely r/t dehydration and reduced desire for intake. Please re-consult SLP if concerns for aspiration or dysphagia emerge. Recommended Exercises:    Therapeutic Interventions: (N/A)      Education: Images and recommendations were reviewed with pt following this exam.   Patient Education: Educated pt to purpose of MBS  Patient Education Response: Needs reinforcement    Prognosis  Prognosis for safe diet advancement: (n/a - regular with thins recommended)  Barriers to reach goals: (n/a - no dysphagia tx recommended at this time)  Duration/Frequency of Treatment  Duration/Frequency of Treatment: No further dysphagia f/u recommended - oral and pharyngeal phases appear intact. Pt's difficulty with PO most likely r/t dehydration and reduced desire for intake. Please re-consult SLP if concerns for aspiration or dysphagia emerge. Safety Devices  Safety Devices in place: (left in radiology with techs at side)      Goals:    1- The patient will tolerate recommended diet without observed clinical signs of aspiration  9/4: RN stated pt still with poor PO intake but demonstrated no difficulty with pills. Analyzed pt with thins via straw, mixed consistency cold cereal, and ice chips. Pt took minimal trials (<10 of all consistencies combined) despite significant encouragement. With final trial of cereal, pt exhibited prolonged holding of bolus and did not propel posteriorly / appear to initiate swallow despite verbal cues.  Pt then took ice chips into oral cavity while holding mixed consistency bolus and continued to hold, eventually exhibiting congested coughing but still holding bolus. With MAX verbal cues, pt completed cough and re-swallow. Pt then stated, \"there - I swallowed it. \" Pt not taking enough PO to fully assess bedside - recommend attempt instrumental swallow evaluation in effort to assess oral pharyngeal phases given overt indicators of dysphagia. Question pt's ability to participate fully given reduced PO intake and apparent confusion at times (ie inappropriate responses to questions, apparent poor comprehension of education). Goal not met - continue goal.   9/4: GOAL MET - no aspiration observed on MBS.     2-The patient/caregiver will demonstrate understanding of dysphagia recommendations/ compensatory strategies for improved swallowing safety. 9/3: Educated pt to purpose of visit, s/s of aspiration, concern if aspiration occurs, rationale for diet recommendation/strategies to reduce risk for aspiration and instruction to notify staff if any signs emerge. Pt asking to be left alone to sleep, appears to understand rationale for evaluation  Cont goal  9/4: Educated pt to role of SLP, purpose of visit, concerns for dysphagia, s/s associated with aspiration, A&P of swallow, and rationale for re-assessment. Pt dismissive of education, stating \"you all just come up with this new stuff all the time. \" Educated pt to rationale for MBS d/t overt indicators of dysphagia at bedside - pt agreeable but question full comprehension and ability to participate. Goal not met. 9/4: Educated pt to A&P of swallow, MBS results, recommendations, and general aspiration precautions. Pt responded \"mmhmm\" but unable to demonstrate comprehension given reduced participation / responsiveness.  GOAL NOT MET - limited by pt participation; d/c goal.    Oral Preparation / Oral Phase  Oral Phase: Impaired  Oral Phase - Major Contributing Deficits  Poor Mastication: Reg solid  Lingual Rocking: Reg solid;Puree  Reduced Posterior Propulsion: All  Holding Of Bolus: Reg solid;Puree  Lingual / Palatal Residue: (trace-mild)        Pharyngeal Phase  Pharyngeal Phase: [facility]      Esophageal Phase  Esophageal Screen: WFL        Pain   Patient Currently in Pain: Denies  Pain Level: 0      Therapy Time:   Individual Concurrent Group Co-treatment   Time In 1030 0000 0000 0000   Time Out 1050 0000 0000 0000   Minutes 20 0 0 0   Variance: 0  Timed Code Treatment Minutes: 0 Minutes  Total Treatment Time: 20      Ginette Cervantes MA CCC-SLP; [id number]  Speech-Language Pathologist  Pager # [phone]  Phone # [phone]  Office # [phone]

## 2020-09-04 NOTE — PROGRESS NOTES
Speech Language Pathology  Facility/Department: [address] CENTER  Dysphagia Daily Treatment Note    NAME: Celine Sánchez  : 1950  MRN: [id number]    Patient Diagnosis(es):   Patient Active Problem List    Diagnosis Date Noted    [facility] 2020    [facility]D [facility] stage [facility] 2020    Multiple myeloma not having achieved remission ([facility] 2020    Diverticulitis of large intestine without perforation or abscess with bleeding 2017    Iron deficiency anemia 2017    Porcelain gallbladder     Anemia due to bone marrow failure (HCC)     GERD (gastroesophageal reflux disease)     Cerebral infarction ([facility] 2012    HTN (hypertension) 2011    CAD (coronary artery disease) 2011    Hyperlipidemia 2011     Allergies: No Known Allergies        CXR (20)  No acute pulmonary pathology         Bony lesions consistent with patient's history of multiple myeloma             Previous MBS  none    Chart reviewed. Medical Diagnosis: WANDER  Treatment Diagnosis: dysphagia    BSE Impression (9/3/20)  Pt alert, kept eyes closed throughout assessment. Pt denied having swallowing issues, but agreed with having decreased PO and N/V. Pt followed simple commands and answered personal questions appropriately. Pt voice and cough are weak. Pt analyzed with ice chips, water via tsp, cup and straw as well as ice cream, applesauce and jello. Pt required assistance keeping head in upright neutral position. Pt exhibited no overt signs of aspiration with any trials. However when initially presented with applesauce, pt expectorated material, stated she doesn't like applesauce. pt then presented with jello and started gagging when the jello placed in mouth (did not swallow). Pt then expectorated this as well as secretions. allowed pt time and then presented with further trials of water and jello.  pt consumed with no overt s/s of aspiration. Swallow movement felt upon palpation of anterior neck. Pt consumed medications per RN, in ice cream followed by water and another dissolved in water. Pt able to swallow and clear oral cavity with medications. Pt declined solid texture trials. Recommend full liquids with close monitoring for s/s of aspiration. If any emerge, make NPO and notify SLP, to determine diet modification vs instrumental exam.  At this time, question whether pt would be able to participate fully in MBS. MBS results  Attempt procedure this date    Pain: None indicated    Current Diet :  Full liquid    Treatment:  Pt seen bedside to address the following goals:  1- The patient will tolerate recommended diet without observed clinical signs of aspiration  9/4: RN stated pt still with poor PO intake but demonstrated no difficulty with pills. Analyzed pt with thins via straw, mixed consistency cold cereal, and ice chips. Pt took minimal trials (<10 of all consistencies combined) despite significant encouragement. With final trial of cereal, pt exhibited prolonged holding of bolus and did not propel posteriorly / appear to initiate swallow despite verbal cues. Pt then took ice chips into oral cavity while holding mixed consistency bolus and continued to hold, eventually exhibiting congested coughing but still holding bolus. With MAX verbal cues, pt completed cough and re-swallow. Pt then stated, \"there - I swallowed it. \" Pt not taking enough PO to fully assess bedside - recommend attempt instrumental swallow evaluation in effort to assess oral pharyngeal phases given overt indicators of dysphagia. Question pt's ability to participate fully given reduced PO intake and apparent confusion at times (ie inappropriate responses to questions, apparent poor comprehension of education).  Goal not met - continue goal.     2-The patient/caregiver will demonstrate understanding of dysphagia recommendations/ compensatory strategies for improved swallowing safety. 9/3: Educated pt to purpose of visit, s/s of aspiration, concern if aspiration occurs, rationale for diet recommendation/strategies to reduce risk for aspiration and instruction to notify staff if any signs emerge. Pt asking to be left alone to sleep, appears to understand rationale for evaluation  Cont goal  9/4: Educated pt to role of SLP, purpose of visit, concerns for dysphagia, s/s associated with aspiration, A&P of swallow, and rationale for re-assessment. Pt dismissive of education, stating \"you all just come up with this new stuff all the time. \" Educated pt to rationale for MBS d/t overt indicators of dysphagia at bedside - pt agreeable but question full comprehension and ability to participate. Goal not met. Patient/Family/Caregiver Education:  As above    Compensatory Strategies:  TBD s/p MBS         Plan:  Continued daily Dysphagia treatment with goals per  plan of care. Attempt MBS this date  Diet recommendations: TBD s/p MBS  DC recommendation: TBD  Treatment: 13  D/W nursingConnie  Needs met prior to leaving room, call button in reach. Karmen Vargas MA CCC-SLP; [id number]  Speech-Language Pathologist  Pager # [phone]  Phone # [phone]  Office # [phone]    If patient is discharged prior to next session, this note will serve as discharge summary.

## 2020-09-04 NOTE — PLAN OF CARE
Nutrition Problem #1: Underweight  Intervention: Food and/or Nutrient Delivery: Continue Current Diet, Start Oral Diet  Nutritional Goals: pt will improve nutrition status by improving PO intake to consume greater than 50 % meals and supplements.

## 2020-09-04 NOTE — DISCHARGE SUMMARY
[facility] Discharge Summary             Attending Physician: Moises Durham DO    Referring MD: Moises Durham DO  [address]  [facility] [place], [address]    Name: Maria Fernanda Goodson :  1950  MRN:  [id number]    Admission: 2020   Discharge:  20     Date: 2020    Diagnosis on admit: HyperNatremia, Metabolic Encephalopathy    Consultations:   1. Nephrology: Dr. Naeem Candelaria  2. Palliative Care: KEO Piper    Medications:    Smiley Blankenship   Home Medication Instructions NCB:[id number]    Printed on:20 4127   Medication Information                      acetaminophen (TYLENOL) 325 MG tablet  Take 650 mg by mouth every 6 hours as needed for Pain             DULoxetine (CYMBALTA) 30 MG extended release capsule  Take 30 mg by mouth daily             gabapentin (NEURONTIN) 300 MG capsule  Take 300 mg by mouth 3 times daily. LORazepam (ATIVAN) 0.5 MG tablet  Take 1 tablet by mouth every 2 hours as needed for Anxiety for up to 30 days. morphine sulfate 20 MG/ML concentrated oral solution  Take 0.5 mLs by mouth every 2 hours as needed for Pain (Respiratory distress) for up to 7 days. ondansetron (ZOFRAN) 8 MG tablet  TAKE 1 TABLET BY MOUTH EVERY 8 HOURS AS NEEDED FOR NAUSEA AND VOMITING             pantoprazole (PROTONIX) 40 MG tablet  TAKE 1 TABLET BY MOUTH ONCE DAILY IN THE MORNING BEFORE BREAKFAST                 Reason for Admission: Hypernatremia, Dehydration, Metabolic Encephalopathy    Hospital Course:   Radha Burger is a 65year old female w/Multiple Myeloma previously on 2nd line tx with Leigh/Guillaume/Dex after failed induction with CyBorD as well as progression with liver involvement 2020. Over the course of the last week the pt has not been feeling well and reported little to no PO intake 2/2 anorexia & nausea. She presented to [facility] office 20 w/increased weakness, weight loss due to poor PO intake.  She was adamant about not tenderness  NEURO: CN II - XII grossly intact    Discharge Laboratory Data:  CBC:   Recent Labs     09/03/20  0523 09/04/20  0416 09/05/20  0430   WBC 1.1* 1.0* 0.9*   HGB 8.4* 7.3* 7.6*   HCT 26.0* 23.0* 23.0*   MCV 86.3 87.7 85.7   PLT 52* 35* 35*     BMP/Mag:  Recent Labs     09/02/20  1344  09/03/20  0523  09/03/20  2109 09/04/20 0416 09/04/20  1304   *   < > 152*   < > 145 142 139   K 4.0  --  3.6  --   --  3.4*  --    *  --  119*  --   --  113*  --    CO2 20*  --  19*  --   --  20*  --    PHOS 3.4  --   --   --   --  1.8*  --    BUN 22*  --  18  --   --  12  --    CREATININE 1.2  --  1.0  --   --  0.8  --    MG 1.20*  --  2.60*  --   --   --   --     < > = values in this interval not displayed. LIVP:   Recent Labs     09/02/20  1344 09/04/20  0416   AST 9* 10*   ALT 10 9*   BILIDIR <0.2 <0.2   BILITOT 0.4 0.3   ALKPHOS 80 68     Coags:   Recent Labs     09/02/20  1344 09/03/20  0523   PROTIME 14.0* 12.7   INR 1.20* 1.09   APTT 27.1 27.5     Uric Acid   Recent Labs     09/02/20  1344 09/04/20  0416   LABURIC 5.9 5.2     PROBLEM LIST:             1. Multiple Myeloma  2. Hypernatremia   3. HTN  4. CAD      TREATMENT:            1. Velcade/Dex (C1D1 4/1/2020)  - Cytoxan added 6/12/20  2. Leigh/Guillaume/Dex (8/13/20)      ASSESSMENT AND PLAN:           1. Multiple Myeloma: stage III by ISS, relapsed refractory   - S/p CyBorD with relapsed noted on liver biopsy 8/11/20    - She wishes for no further chemotherapy and will d/c home today with hospice     2. Heme: Anemia r/t disease & CKD; Panyctopenia d/t chemotx     - No further transfusion tx    3. GI / Nutrition: Intake & diet per pts wishes  Nausea:  - Cont Zofran 8mg prn for nausea  - Cont PPI daily      4. Neuropathy  - Cont Cymbalta 30mg daily  - Cont Gabapentin 300 TID         Condition on discharge: Stable    Discharge Instructions:  No follow up necessary. Pt and family instructed to call us or hospice at any point for any needs.  It was a pleasure caring for Mrs. Woodrow Fuller. This discharge summary and plan was discussed and agreed upon with Dr. Cristy Perales.     KEO Abbasi - CNP

## 2020-09-04 NOTE — PROGRESS NOTES
[facility] Progress Note    2020     Pramod Newton    MRN: [id number]    : 1950    Referring MD: Jeni Laguna, DO  [address]  [address]  [place], [address]      SUBJECTIVE:  Refusing to eat and drink, take medications    ECOG PS:(3) Capable of limited self-care, confined to bed or chair > 50% of waking hours     KPS: 50%  Requires considerable assistance and frequent medical care    Isolation: None    Medications    Scheduled Meds:   gabapentin  300 mg Oral TID    acyclovir  400 mg Oral BID    atorvastatin  40 mg Oral Nightly    calcium elemental  500 mg Oral Daily    Vitamin D  2,000 Units Oral Daily    metoprolol tartrate  25 mg Oral BID    DULoxetine  30 mg Oral Daily    pantoprazole  40 mg Oral QAM AC    potassium chloride  40 mEq Oral Daily with breakfast    sodium chloride flush  10 mL Intravenous 2 times per day    Saline Mouthwash  15 mL Swish & Spit 4x Daily AC & HS     Continuous Infusions:   sodium chloride      dextrose 40 mL/hr at 20 0105     PRN Meds:.acetaminophen, ondansetron, sodium chloride, sodium chloride flush, potassium chloride, magnesium sulfate, magnesium hydroxide, Saline Mouthwash, alteplase, ondansetron    ROS:  As noted above, otherwise remainder of 10-point ROS negative    Physical Exam:     I&O:      Intake/Output Summary (Last 24 hours) at 2020 0741  Last data filed at 2020 0656  Gross per 24 hour   Intake 2048 ml   Output 300 ml   Net 1748 ml       Vital Signs:  /66   Pulse 60   Temp 97.9 °F (36.6 °C) (Oral)   Resp 14   Ht 5' 5\" (1.651 m)   Wt 94 lb 9.2 oz (42.9 kg)   SpO2 97%   BMI 15.74 kg/m²     Weight:    Wt Readings from Last 3 Encounters:   20 94 lb 9.2 oz (42.9 kg)   20 101 lb 3.2 oz (45.9 kg)   20 109 lb 5.6 oz (49.6 kg)         General: Awake, alert and oriented.   HEENT: normocephalic, PERRL, no scleral erythema or icterus, Oral mucosa moist and intact, throat clear  NECK: supple CKD; Panyctopenia d/t chemotx     - Transfuse for Hgb < 7 and Platelets < 03 K.  - Reticrit weekly     4. Metabolic: HypoK, HypoPhos. HyperNa POA much improved / resolved. - Followed by Nephrology (Yaakov Anaya) - appreciate assistance  - Cont KCl 40 mEq daily - changed to dissolvable packet  - Cont IVFs: D5 at 75ml/hr   - Give KPhos 30mmol x1 9/4     5. GI / Nutrition: Ongoing poor PO intake, difficulty with pills  - Consult dietician - cont to follow. Consider calorie count  - SLP consult - recommending full liquid diet at this time  Nausea:  - Cont Zofran 8mg prn for nausea  - Cont PPI daily      6. Psych  - Cont Cymbalta 30mg daily     7. Neuropathy  - Gabapentin 300 TID     8. Debilitation: 2/2 myeloma  - Consult SW  - Consult PT/OT  - May need to consider SNF placement         - DVT Prophylaxis: Platelets <51,705 cells/dL - prophylactic lovenox on hold and mechanical prophylaxis with bilateral SCDs while in bed in place. Contraindications to pharmacologic prophylaxis: Thrombocytopenia  Contraindications to mechanical prophylaxis: None    - Disposition: Home possibly once labs stable off IV supplementation and she is able to support herself nutritionally. May need to consider SNF        KEO Pryor - NIKI Robins. Rupinder Berrios DO, MS  Oncology/Hematology Care    Please contact via:  1. [employer]  2.   Cell Phone:  [phone]    9/4/2020   7:41 AM

## 2020-09-04 NOTE — CONSULTS
[facility]  Palliative Medicine Consultation Note      Date Of Admission:9/2/2020  Date of consult: 09/04/20  Seen by [facility] in the past:  No    Recommendations:        Pt is alert and oriented, with good insight into her medical problems. She says the treatment for her multiple myeloma \"doesn't help me at all. \" She talks about her frustrations with being \"pressured\" to eat/drink and says she does not want a feeding tube. Discussed goals of care and she wants to go home and be with her family. She'd like to avoid further hospitalizations and so we talked about taking a comfort care approach and hospice. Explained the hospice philosophy and services, and offered choice of hospice. She is in agreement with hospice and says she will tell her family her decision. She has no preference of hospice agency. Left a message for lynsey Little/meghan CROWLEY. 1. Goals of Care/Advanced Care planning/Code status: changed to [facility] per pt's wishes. She wants comfort care and to go home with hospice. 2. Pain: pt denies any pain/discomfort. 3. SOB: breathing easily on room air. 4. Poor appetite: pt reports lack of appetite and did not appreciate when she perceived that someone \"threatened\" her with a feeding tube if she couldn't finish 3 Ensures per day. Pt does not want a feeding tube and wants to take more of a comfort care approach.  She has Ensure and soda at the bedside  5 Disposition: d/c home with hospice when able    Reason for Consult:         __x___ Goals of Care  __x___ Code Status Discussion/Advanced Care Planning   __x___ Psychosocial/Family Support  __x___ Symptom Management  _____ Other Virgene Favor)    Requesting Physician: Dr. Jiménez Guardian: Hypernatremia, failure to thrive    History Obtained From:  patient, electronic medical record    History of Present Illness:         Ms Leyla Medel is a 65year old female with PMH of CAD, ESRD, HLD, HTN, and multiple myeloma (stage III by ISS, Intravenous, Continuous PRN  sodium chloride flush 0.9 % injection 10 mL, 10 mL, Intravenous, 2 times per day  sodium chloride flush 0.9 % injection 10 mL, 10 mL, Intravenous, PRN  potassium chloride 10 mEq/100 mL IVPB (Peripheral Line), 10 mEq, Intravenous, PRN  magnesium sulfate 4 g in 100 mL IVPB premix, 4 g, Intravenous, PRN  magnesium hydroxide (MILK OF MAGNESIA) 400 MG/5ML suspension 30 mL, 30 mL, Oral, Daily PRN  Saline Mouthwash 15 mL, 15 mL, Swish & Spit, 4x Daily AC & HS  Saline Mouthwash 15 mL, 15 mL, Swish & Spit, Q4H PRN  alteplase (CATHFLO) injection 2 mg, 2 mg, Intracatheter, PRN  ondansetron (ZOFRAN) injection 8 mg, 8 mg, Intravenous, Q8H PRN    Allergies:  Patient has no known allergies. Social History:    MARITAL STATUS:    Patient currently lives with family -     Review of Systems -   General ROS: negative  Psychological ROS: negative  ENT ROS: negative  Hematological and Lymphatic ROS: negative  Respiratory ROS: no cough, shortness of breath, or wheezing  Cardiovascular ROS: no chest pain or dyspnea on exertion  Gastrointestinal ROS: positive for - appetite loss  Genito-Urinary ROS: no dysuria, trouble voiding, or hematuria  Musculoskeletal ROS: positive for - muscular weakness  Neurological ROS: no TIA or stroke symptoms    Objective:        PHYSICAL EXAM:    General appearance: alert, appears stated age and cooperative  Lungs: clear to auscultation bilaterally  Heart: regular rate and rhythm  Abdomen: soft, non-tender; bowel sounds normal; no masses,  no organomegaly  Extremities: extremities normal, atraumatic, no cyanosis or edema  Skin: Skin color, texture, turgor normal. No rashes or lesions  Neurologic: Mental status: Alert, oriented, thought content appropriate    Palliative Performance Scale:  60% ? Ambulation reduced; Significant disease; Can't do hobbies/housework; intake normal   or reduced; occasional assist; LOC full/confusion  50% ? Mainly sit/lie;  Extensive disease; Can't do any work; Considerable assist; intake normal  Or reduced; LOC full/confusion  40% ? Mainly in bed; Extensive disease; Mainly assist; intake normal or reduced; occasional assist; LOC full/confusion  30% ? Bed Bound; Extensive disease; Total care; intake reduced; LOC full/confusion  20% ? Bed Bound; Extensive disease; Total care; intake minimal; Drowsy/coma  10% ? Bed Bound; Extensive disease; Total care; Mouth care only; Drowsy/coma  0 ?  Death    PPS: 50%    Vitals:    /68   Pulse 63   Temp 97.4 °F (36.3 °C) (Oral)   Resp 18   Ht 5' 5\" (1.651 m)   Wt 100 lb 5 oz (45.5 kg)   SpO2 97%   BMI 16.69 kg/m²     DATA:    CBC with Differential:    Lab Results   Component Value Date    WBC 1.0 09/04/2020    RBC 2.62 09/04/2020    RBC 3.68 08/03/2017    HGB 7.3 09/04/2020    HCT 23.0 09/04/2020    PLT 35 09/04/2020    MCV 87.7 09/04/2020    MCH 27.8 09/04/2020    MCHC 31.7 09/04/2020    RDW 20.8 09/04/2020    SEGSPCT 35.8 04/09/2012    BANDSPCT 16 09/03/2020    LYMPHOPCT 30.0 09/04/2020    LYMPHOPCT 36.1 08/03/2017    MONOPCT 3.0 09/04/2020    EOSPCT 0.9 04/09/2012    BASOPCT 1.0 09/04/2020    MONOSABS 0.0 09/04/2020    LYMPHSABS 0.3 09/04/2020    EOSABS 0.1 09/04/2020    BASOSABS 0.0 09/04/2020    DIFFTYPE Auto-K 04/09/2012     BMP:    Lab Results   Component Value Date     09/04/2020    K 3.4 09/04/2020    K 3.8 08/09/2020     09/04/2020    CO2 20 09/04/2020    BUN 12 09/04/2020    LABALBU 2.9 09/04/2020    CREATININE 0.8 09/04/2020    CALCIUM 7.9 09/04/2020    GFRAA >60 09/04/2020    GFRAA >60 04/09/2012    LABGLOM >60 09/04/2020    GLUCOSE 104 09/04/2020    GLUCOSE 87 06/08/2011     Albumin:    Lab Results   Component Value Date    LABALBU 2.9 09/04/2020         Conclusion/Time spent:         Recommendations see above    Pt 9533-6504  Time spent with patient and/or family: 15  Time reviewing records: 10  Time communicating with staff: 10    A total of 35 minutes spent with the

## 2020-09-04 NOTE — PROGRESS NOTES
Office : [phone]     Fax :[phone]       Nephrology  Note      Patient's Name: Leodan Benitez    Reason for Consult:  Hypernatremia , Hypomagnesemia. Requesting Physician:  Myra Casanova MD      Chief Complaint: High sodium level, failure to thrive      History of Present Ilness:    Leodan Benitez is a 65 y.o. female with history of multiple myeloma, hypertension, coronary artery disease, WANDER, weakness who presented with complaint of feeling weak, poor appetite. She was admitted for treatment of hypernatremia. Initial blood work showed sodium of 154, BUN is 22 and creatinine is 1.2.   Magnesium level is low at 1.2.    20    Good Uo   Cr stable   Na better       Past Medical History:   Diagnosis Date    Allergic rhinitis     Anemia     CAD (coronary artery disease)     CAD (coronary artery disease)     Diverticulosis     ESRD (end stage renal disease) on dialysis ([facility] [address] 2020    GERD (gastroesophageal reflux disease)     History of colonoscopy     Hyperlipidemia     Hypertension     Myocardial infarction, inferior wall [facility]) 2006       Past Surgical History:   Procedure Laterality Date    BREAST REDUCTION SURGERY  Bilat     SECTION  x3    CT BONE MARROW BIOPSY  2020    CT BONE MARROW BIOPSY 2020 [facility] SCAN    CT NEEDLE BIOPSY LIVER PERCUTANEOUS  2020    CT NEEDLE BIOPSY LIVER PERCUTANEOUS 2020 [facility] CT SCAN    HYSTERECTOMY      TYMPANOPLASTY         Family History   Problem Relation Age of Onset    Heart Disease Mother     High Cholesterol Mother     Heart Disease Sister         hypertension    Diabetes Sister         hypertension    High Cholesterol Sister         hypertension    Heart Disease Brother  High Cholesterol Brother     Stroke Brother         hypertension        reports that she has been smoking. She has a 22.50 pack-year smoking history. She has never used smokeless tobacco. She reports that she does not drink alcohol or use drugs. Allergies:  Patient has no known allergies.     Current Medications:    gabapentin (NEURONTIN) capsule 300 mg, TID  acetaminophen (TYLENOL) tablet 650 mg, Q6H PRN  acyclovir (ZOVIRAX) tablet 400 mg, BID  atorvastatin (LIPITOR) tablet 40 mg, Nightly  calcium elemental (OSCAL) tablet 500 mg, Daily  Vitamin D (CHOLECALCIFEROL) tablet 2,000 Units, Daily  metoprolol tartrate (LOPRESSOR) tablet 25 mg, BID  DULoxetine (CYMBALTA) extended release capsule 30 mg, Daily  pantoprazole (PROTONIX) tablet 40 mg, QAM AC  ondansetron (ZOFRAN) tablet 8 mg, Q8H PRN  potassium chloride (KLOR-CON M) extended release tablet 40 mEq, Daily with breakfast  0.9 % sodium chloride infusion, Continuous PRN  sodium chloride flush 0.9 % injection 10 mL, 2 times per day  sodium chloride flush 0.9 % injection 10 mL, PRN  potassium chloride 10 mEq/100 mL IVPB (Peripheral Line), PRN  magnesium sulfate 4 g in 100 mL IVPB premix, PRN  magnesium hydroxide (MILK OF MAGNESIA) 400 MG/5ML suspension 30 mL, Daily PRN  Saline Mouthwash 15 mL, 4x Daily AC & HS  Saline Mouthwash 15 mL, Q4H PRN  alteplase (CATHFLO) injection 2 mg, PRN  ondansetron (ZOFRAN) injection 8 mg, Q8H PRN  dextrose 5 % solution, Continuous        Review of Systems:   14 point ROS obtained but were negative except mentioned in HPI      Physical exam:     Vitals:  BP (!) 142/71   Pulse 74   Temp 97.8 °F (36.6 °C) (Oral)   Resp 17   Ht 5' 5\" (1.651 m)   Wt 94 lb 9.2 oz (42.9 kg)   SpO2 94%   BMI 15.74 kg/m²   Constitutional:  AA  Skin: no rash, turgor dry skin  Heent:  eomi, mmm  Neck: no bruits or jvd noted  Cardiovascular:  S1, S2 without m/r/g  Respiratory: CTA B without w/r/r  Abdomen:  +bs, soft, nt, nd  Ext: no  lower extremity edema  Psychiatric: mood and affect appropriate  Musculoskeletal:  Rom, muscular strength intact    Labs:  CBC:   Recent Labs     09/02/20  1344 09/03/20  0523   WBC 1.5* 1.1*   HGB 9.2* 8.4*   PLT 53* 52*     BMP:    Recent Labs     09/02/20  1344  09/03/20  0523 09/03/20  1501 09/03/20  2109   *   < > 152* 147* 145   K 4.0  --  3.6  --   --    *  --  119*  --   --    CO2 20*  --  19*  --   --    BUN 22*  --  18  --   --    CREATININE 1.2  --  1.0  --   --    GLUCOSE 127*  --  153*  --   --     < > = values in this interval not displayed. Ca/Mg/Phos:   Recent Labs     09/02/20  1344 09/03/20  0523   CALCIUM 8.3 8.0*   MG 1.20* 2.60*   PHOS 3.4  --      Hepatic:   Recent Labs     09/02/20  1344   AST 9*   ALT 10   BILITOT 0.4   ALKPHOS 80     Troponin: No results for input(s): TROPONINI in the last 72 hours. BNP: No results for input(s): BNP in the last 72 hours. Lipids: No results for input(s): CHOL, TRIG, HDL, LDLCALC, LABVLDL in the last 72 hours. ABGs: No results for input(s): PHART, PO2ART, OFJ0VUZ in the last 72 hours. INR:   Recent Labs     09/02/20  1344 09/03/20  0523   INR 1.20* 1.09     UA:No results for input(s): Reford Romulus, GLUCOSEU, BILIRUBINUR, KETUA, SPECGRAV, BLOODU, PHUR, PROTEINU, UROBILINOGEN, NITRU, LEUKOCYTESUR, Hetal Gosselin in the last 72 hours. Urine Microscopic: No results for input(s): LABCAST, BACTERIA, COMU, HYALCAST, WBCUA, RBCUA, EPIU in the last 72 hours. Urine Culture: No results for input(s): LABURIN in the last 72 hours. Urine Chemistry: No results for input(s): Lavanda Fern, PROTEINUR, NAUR in the last 72 hours. IMAGING:  XR CHEST (2 VW)   Final Result      No acute pulmonary pathology      Bony lesions consistent with patient's history of multiple myeloma                      I/O last 3 completed shifts: In: 1406 [P.O.:180; I.V.:1159; IV Piggyback:67]  Out: 200 [Urine:200]          Assessment/Plan :      1.   Hypernatremia.    - Na better   - change to D5       2. HTN. On metoprolol    3. Anemia of chronic disease and MM     4.  Multiple myeloma. - per Dr Rosaura Buck     5. Failure to thrive. Slow introduction of oral intake.         We will continue to monitor electrolytes, renal function  History of WANDER and has required hemodialysis but at this time renal function stable              Thank you for allowing us to participate in care of Xiomara August         Electronically signed by: Myles Blank MD, 9/3/2020, 10:47 PM      [facility] [address]  Office : [phone]  Fax :[phone]

## 2020-09-04 NOTE — PROGRESS NOTES
Dr. Namrata Marie sat down and talked with patient , , and daughter ( on speaker on phone). He answered their questions about the patient wanting to go hospice. Pt and family verbalize understanding . Needs met. Pt and  waiting for [facility]ospice [facility] to come talk with them. Will monitor.

## 2020-09-05 VITALS
HEART RATE: 94 BPM | HEIGHT: 65 IN | BODY MASS INDEX: 16.9 KG/M2 | SYSTOLIC BLOOD PRESSURE: 119 MMHG | OXYGEN SATURATION: 93 % | TEMPERATURE: 98.2 F | DIASTOLIC BLOOD PRESSURE: 72 MMHG | WEIGHT: 101.41 LBS | RESPIRATION RATE: 15 BRPM

## 2020-09-05 LAB
ANISOCYTOSIS: ABNORMAL
BASOPHILS ABSOLUTE: 0 K/UL (ref 0–0.2)
BASOPHILS RELATIVE PERCENT: 0 %
EOSINOPHILS ABSOLUTE: 0 K/UL (ref 0–0.6)
EOSINOPHILS RELATIVE PERCENT: 0 %
HCT VFR BLD CALC: 23 % (ref 36–48)
HEMOGLOBIN: 7.6 G/DL (ref 12–16)
LYMPHOCYTES ABSOLUTE: 0.3 K/UL (ref 1–5.1)
LYMPHOCYTES RELATIVE PERCENT: 28 %
MCH RBC QN AUTO: 28.2 PG (ref 26–34)
MCHC RBC AUTO-ENTMCNC: 32.8 G/DL (ref 31–36)
MCV RBC AUTO: 85.7 FL (ref 80–100)
MONOCYTES ABSOLUTE: 0 K/UL (ref 0–1.3)
MONOCYTES RELATIVE PERCENT: 4 %
NEUTROPHILS ABSOLUTE: 0.6 K/UL (ref 1.7–7.7)
NEUTROPHILS RELATIVE PERCENT: 68 %
PDW BLD-RTO: 21.2 % (ref 12.4–15.4)
PLATELET # BLD: 35 K/UL (ref 135–450)
PMV BLD AUTO: 9.7 FL (ref 5–10.5)
RBC # BLD: 2.68 M/UL (ref 4–5.2)
SARS-COV-2, PCR: NOT DETECTED
SCHISTOCYTES: ABNORMAL
WBC # BLD: 0.9 K/UL (ref 4–11)

## 2020-09-05 PROCEDURE — 85025 COMPLETE CBC W/AUTO DIFF WBC: CPT

## 2020-09-05 PROCEDURE — 51798 US URINE CAPACITY MEASURE: CPT

## 2020-09-05 RX ORDER — LORAZEPAM 0.5 MG/1
0.5 TABLET ORAL
Qty: 60 TABLET | Refills: 0 | Status: SHIPPED | OUTPATIENT
Start: 2020-09-05 | End: 2020-10-05

## 2020-09-05 ASSESSMENT — PAIN SCALES - GENERAL: PAINLEVEL_OUTOF10: 0

## 2020-09-05 NOTE — PROGRESS NOTES
I spoke to the NP Frankie Maciel about the pt's discharge, the pt will be going home today with [facility] (Frankie Maciel spoke to hospice [facility] yesterday to confirm this). Hospice should be meeting the pt at 1300. I also requested that a DNR-CC get signed and placed on the pt's paper chart for hospice, the NP will get the DNR and have the MD sign it and will place it on the pt's paper chart for the hospice nurse. The pt also has some prescriptions on her chart that will go home with her and hospice. I called the CM on call for today Carl Mcgovern) and updated her as well. I will continue to follow throughout the discharge.   Mariaelena Cancino

## 2020-09-05 NOTE — PROGRESS NOTES
Status unchanged, pt didn't drink much of her coffee from this morning, just a few sips it looks like, the pt did ask for some ice with her pepsi and she is now sipping on that. The pt has no other needs, the pt is refusing her 1400 Neurontin, call light in reach and bed alarm on. Waiting on [facility] to come see the pt.   Adam Alcaraz

## 2020-09-05 NOTE — PROGRESS NOTES
Cathy Joshi with [facility] is here, she has spoke to the family and they are downstairs discussing the pt's discharge, Cathy Valdiviarehan at this time plans on setting up transport after the family talks about the discharge. AVS printed off and placed on the pt's chart, pt's Signed DNR is also on the chart along with the pt's prescriptions, Cathy Joshi is aware and has no other needs from me at this time. Cathy Joshi was given my number in case she needs any more assistance from me in the pt's discharge. I will continue to follow through out the discharge process.   Melissa Hubbard

## 2020-09-05 NOTE — PLAN OF CARE
Problem: Falls - Risk of:  Goal: Will remain free from falls  Description: Will remain free from falls  9/5/2020 0800 by Graeme Calderon RN  Outcome: Ongoing  9/5/2020 0115 by Boris Ricardo  Outcome: Ongoing  Note:  Pt is a High fall risk. See Lary Quirk Fall Score and ABCDS Injury Risk assessments. + Screening for Orthostasis and/or + High Fall Risk per VILLAGRAN/ABCDS: Explained fall risk precautions to pt and family and rationale behind their use to keep the patient safe. Pt bed is in low position, side rails up, call light and belongings are in reach. Fall wristband applied and present on pts wrist.  Bed alarm on. Pt encouraged to call for assistance. Will continue with hourly rounds for PO intake, pain needs, toileting and repositioning as needed. Goal: Absence of physical injury  Description: Absence of physical injury  Outcome: Ongoing     Problem: Venous Thromboembolism:  Goal: Will show no signs or symptoms of venous thromboembolism  Description: Will show no signs or symptoms of venous thromboembolism  Outcome: Ongoing  Goal: Absence of signs or symptoms of impaired coagulation  Description: Absence of signs or symptoms of impaired coagulation  Outcome: Ongoing     Problem: Nausea/Vomiting:  Goal: Absence of nausea/vomiting  Description: Absence of nausea/vomiting  9/5/2020 0800 by Graeme Calderon RN  Outcome: Ongoing  9/5/2020 0115 by Boris Ricardo  Outcome: Ongoing  Note: Patient had one bout of emesis thus far this shift. Patient medicated with Zofran 8mg IVP once (see eMar). Patient states medication effective in reducing nausea and vomiting. Continue to monitor. Problem: Nutrition  Goal: Optimal nutrition therapy  Description: Nutrition Problem #1: Underweight  Intervention: Food and/or Nutrient Delivery: Continue Current Diet, Start Oral Diet  Nutritional Goals: pt will improve nutrition status by improving PO intake to consume greater than 50 % meals and supplements.        9/5/2020 0800 by Inés Belle CHERISE Bray  Outcome: Ongoing  9/5/2020 0115 by Haja Cordon  Outcome: Ongoing  Note: Patient continues with poor PO intake this shift. Patient has been encouraged/offered food and fluids multiple times and has declined. Education provided to patient in regards to maintaining adequate PO intake to support fluid volume status. Continue to monitor.       Problem: Mood - Altered:  Goal: Mood stable  Description: Mood stable  Outcome: Ongoing

## 2020-09-05 NOTE — PROGRESS NOTES
Assessment complete, see flow sheet. The pt is resting in bed, pt's only request this morning is to have some coffee, coffee given per request, pt states she doesn't want anything else to eat or drink and to take her tray away, the pt is also saying she doesn't want any of her medications this morning, I reviewed all of her scheduled medications with her but she states she doesn't want them. Pt have no other needs at this time, call light in reach vitals are stable, the plan for the pt today is to go home with hospice later today, the hospice nurse will need to see her today first.  I will continue to follow through out the pt's discharge.   Karthik Ortiz

## 2020-09-05 NOTE — PLAN OF CARE
Problem: Falls - Risk of:  Goal: Will remain free from falls  Description: Will remain free from falls  Outcome: Ongoing  Note:  Pt is a High fall risk. See Lary Quirk Fall Score and ABCDS Injury Risk assessments. + Screening for Orthostasis and/or + High Fall Risk per VILLAGRAN/ABCDS: Explained fall risk precautions to pt and family and rationale behind their use to keep the patient safe. Pt bed is in low position, side rails up, call light and belongings are in reach. Fall wristband applied and present on pts wrist.  Bed alarm on. Pt encouraged to call for assistance. Will continue with hourly rounds for PO intake, pain needs, toileting and repositioning as needed. Problem: Nausea/Vomiting:  Goal: Absence of nausea/vomiting  Description: Absence of nausea/vomiting  Outcome: Ongoing  Note: Patient had one bout of emesis thus far this shift. Patient medicated with Zofran 8mg IVP once (see eMar). Patient states medication effective in reducing nausea and vomiting. Continue to monitor. Problem: Nutrition  Goal: Optimal nutrition therapy  Description: Nutrition Problem #1: Underweight  Intervention: Food and/or Nutrient Delivery: Continue Current Diet, Start Oral Diet  Nutritional Goals: pt will improve nutrition status by improving PO intake to consume greater than 50 % meals and supplements. Outcome: Ongoing  Note: Patient continues with poor PO intake this shift. Patient has been encouraged/offered food and fluids multiple times and has declined. Education provided to patient in regards to maintaining adequate PO intake to support fluid volume status. Continue to monitor.

## 2020-09-05 NOTE — PROGRESS NOTES
Discharge instructions reviewed with the pt at this time, IV removed and prescriptions given to the pt. All questions answered. Pt signed discharge papers, and voiced understanding. Copy of discharge papers given to the pt and a copy placed in the pt's paper chart. The pt will be going home with her  in private transportation, the pt will be going home with hospice, [facility] will be following up with the pt at discharge.   Shanna Barkley

## 2020-09-05 NOTE — PROGRESS NOTES
Office : [phone]     Fax :[phone]       Nephrology  Note      Patient's Name: Laila Mayo    Reason for Consult:  Hypernatremia , Hypomagnesemia. Requesting Physician:  Kecia Madsen MD      Chief Complaint: High sodium level, failure to thrive      History of Present Ilness:    Laila Mayo is a 65 y.o. female with history of multiple myeloma, hypertension, coronary artery disease, WANDER, weakness who presented with complaint of feeling weak, poor appetite. She was admitted for treatment of hypernatremia. Initial blood work showed sodium of 154, BUN is 22 and creatinine is 1.2.   Magnesium level is low at 1.2.        Good Uo   Cr stable   Na better   Pt decided to go with hospice     Past Medical History:   Diagnosis Date    Allergic rhinitis     Anemia     CAD (coronary artery disease)     CAD (coronary artery disease)     Diverticulosis     ESRD (end stage renal disease) on dialysis ([facility] [address] 2020    GERD (gastroesophageal reflux disease)     History of colonoscopy     Hyperlipidemia     Hypertension     Myocardial infarction, inferior wall [facility]) 2006       Past Surgical History:   Procedure Laterality Date    BREAST REDUCTION SURGERY  Bilat     SECTION  x3    CT BONE MARROW BIOPSY  2020    CT BONE MARROW BIOPSY 2020 [facility]    CT NEEDLE BIOPSY LIVER PERCUTANEOUS  2020    CT NEEDLE BIOPSY LIVER PERCUTANEOUS 2020 [facility] CT SCAN    HYSTERECTOMY      TYMPANOPLASTY         Family History   Problem Relation Age of Onset    Heart Disease Mother     High Cholesterol Mother     Heart Disease Sister         hypertension    Diabetes Sister         hypertension    High Cholesterol Sister         hypertension    Heart Disease Brother     High Cholesterol Brother     Stroke Brother         hypertension        reports that she has been smoking. She has a 22.50 pack-year smoking history. She has never used smokeless tobacco. She reports that she does not drink alcohol or use drugs. Allergies:  Patient has no known allergies.     Current Medications:    potassium bicarb-citric acid (EFFER-K) effervescent tablet 40 mEq, Daily  gabapentin (NEURONTIN) capsule 300 mg, TID  acetaminophen (TYLENOL) tablet 650 mg, Q6H PRN  acyclovir (ZOVIRAX) tablet 400 mg, BID  atorvastatin (LIPITOR) tablet 40 mg, Nightly  calcium elemental (OSCAL) tablet 500 mg, Daily  Vitamin D (CHOLECALCIFEROL) tablet 2,000 Units, Daily  metoprolol tartrate (LOPRESSOR) tablet 25 mg, BID  DULoxetine (CYMBALTA) extended release capsule 30 mg, Daily  pantoprazole (PROTONIX) tablet 40 mg, QAM AC  ondansetron (ZOFRAN) tablet 8 mg, Q8H PRN  0.9 % sodium chloride infusion, Continuous PRN  sodium chloride flush 0.9 % injection 10 mL, 2 times per day  sodium chloride flush 0.9 % injection 10 mL, PRN  potassium chloride 10 mEq/100 mL IVPB (Peripheral Line), PRN  magnesium sulfate 4 g in 100 mL IVPB premix, PRN  magnesium hydroxide (MILK OF MAGNESIA) 400 MG/5ML suspension 30 mL, Daily PRN  Saline Mouthwash 15 mL, 4x Daily AC & HS  Saline Mouthwash 15 mL, Q4H PRN  alteplase (CATHFLO) injection 2 mg, PRN  ondansetron (ZOFRAN) injection 8 mg, Q8H PRN        Review of Systems:   14 point ROS obtained but were negative except mentioned in HPI      Physical exam:     Vitals:  BP (!) 114/59   Pulse 90   Temp 97.8 °F (36.6 °C) (Oral)   Resp 16   Ht 5' 5\" (1.651 m)   Wt 100 lb 5 oz (45.5 kg)   SpO2 99%   BMI 16.69 kg/m²   Constitutional:  AA  Skin: no rash, turgor dry skin  Heent:  eomi, mmm  Neck: no bruits or jvd noted  Cardiovascular:  S1, S2 without m/r/g  Respiratory: CTA B without w/r/r  Abdomen:  +bs, soft, nt, nd  Ext: no  lower extremity edema  Psychiatric: mood and affect appropriate  Musculoskeletal:  Rom, muscular strength intact    Labs:  CBC:   Recent Labs     09/02/20  1344 09/03/20 0523 09/04/20  0416   WBC 1.5* 1.1* 1.0*   HGB 9.2* 8.4* 7.3*   PLT 53* 52* 35*     BMP:    Recent Labs     09/02/20  1344  09/03/20 0523 09/03/20  2109 09/04/20 0416 09/04/20  1304   *   < > 152*   < > 145 142 139   K 4.0  --  3.6  --   --  3.4*  --    *  --  119*  --   --  113*  --    CO2 20*  --  19*  --   --  20*  --    BUN 22*  --  18  --   --  12  --    CREATININE 1.2  --  1.0  --   --  0.8  --    GLUCOSE 127*  --  153*  --   --  104*  --     < > = values in this interval not displayed. Ca/Mg/Phos:   Recent Labs     09/02/20 1344 09/03/20 0523 09/04/20 0416   CALCIUM 8.3 8.0* 7.9*   MG 1.20* 2.60*  --    PHOS 3.4  --  1.8*     Hepatic:   Recent Labs     09/02/20 1344 09/04/20  0416   AST 9* 10*   ALT 10 9*   BILITOT 0.4 0.3   ALKPHOS 80 68     Troponin: No results for input(s): TROPONINI in the last 72 hours. BNP: No results for input(s): BNP in the last 72 hours. Lipids: No results for input(s): CHOL, TRIG, HDL, LDLCALC, LABVLDL in the last 72 hours. ABGs: No results for input(s): PHART, PO2ART, PMB9XNB in the last 72 hours. INR:   Recent Labs     09/02/20 1344 09/03/20 0523   INR 1.20* 1.09     UA:No results for input(s): Lucilla Milton, GLUCOSEU, BILIRUBINUR, KETUA, SPECGRAV, BLOODU, PHUR, PROTEINU, UROBILINOGEN, NITRU, LEUKOCYTESUR, Vergil Yaya in the last 72 hours. Urine Microscopic: No results for input(s): LABCAST, BACTERIA, COMU, HYALCAST, WBCUA, RBCUA, EPIU in the last 72 hours. Urine Culture: No results for input(s): LABURIN in the last 72 hours. Urine Chemistry: No results for input(s): Zohaib Joyce, PROTEINUR, NAUR in the last 72 hours.              IMAGING:  FL MODIFIED BARIUM SWALLOW W VIDEO   Final Result      Normal swallowing study with no sign of any aspiration            XR CHEST (2 VW)   Final Result      No acute

## 2020-09-05 NOTE — CARE COORDINATION
CM following, spoke with nurse, [facility] NP set up [address] [place] [address] for meeting today at 1pm.  Will follow to see what CM can do for DC to home with Hospice. Electronically signed by Fariha Millan RN on 9/5/2020 at 10:40 AM  [phone]    UPDATE:  Spoke with Shilpa Lantigua at [address] [place] [address] and they confirmed the family meeting to day at [address] [place] [address], they will let me know if they have any CM needs.   Electronically signed by Fariha Millan RN on 9/5/2020 at 11:08 AM  [phone]

## 2020-09-05 NOTE — CARE COORDINATION
Case Management Assessment            Discharge Note                    Date / Time of Note: 2020 2:06 PM                  Discharge Note Completed by: Rosalva Tinsley    Patient Name: Jayla    YOB: 1950  Diagnosis: WANDER (acute kidney injury) [facility]) [N17.9]   Date / Time: 2020 12:39 PM    Current PCP: Mauricio Daley MD  Clinic patient: No    Hospitalization in the last 30 days: Yes    Advance Directives:  Code Status: DNR-CC  [facility] DNR form completed and on chart: Yes    Financial:  Payor: Janette Breaux / Plan: [place] / Product Type: *No Product type* /      [facility]    [address] [facility] [address]  [facility]  Phone: [phone] Fax: [phone]      Assistance purchasing medications?: Potential Assistance Purchasing Medications: No  Assistance provided by Case Management: None at this time    Does patient want to participate in local refill/ meds to beds program?: No    Meds To Beds General Rules:  1. Can ONLY be done Monday- Friday between 8:30am-5pm  2. Prescription(s) must be in pharmacy by 3pm to be filled same day  3. Copy of patient's insurance/ prescription drug card and patient face sheet must be sent along with the prescription(s)  4. Cost of Rx cannot be added to hospital bill. If financial assistance is needed, please contact unit  or ;  or  CANNOT provide pharmacy voucher for patients co-pays  5.  Patients can then  the prescription on their way out of the hospital at discharge, or pharmacy can deliver to the bedside if staff is available. (payment due at time of pick-up or delivery - cash, check, or card accepted)     Able to afford home medications/ co-pay costs: Yes    ADLS:  Current PT AM-PAC Score:     Current OT AM-PAC Score:         DISCHARGE Disposition: Home with Hospice: HOC     LOC at discharge: Not Applicable  MAGNO Completed: Not Indicated    Notification completed in HENS/PAS?:  Not Applicable    IMM Completed:   Not Indicated    Transportation:  Transportation PLAN for discharge: EMS transportation   Mode of Transport: Ambulance stretcher - BLS  Reason for medical transport: Other: Hospice  Name of [address] [place] [address],[address]: Not Applicable  Time of Transport:     Transport form completed: Yes        Hospice Services:  Location: Home  Agency: [facility]  Phone: [phone]    Consents signed: Yes      Additional CM Notes: pt will DC home with  with [address] following at [address] for Transition of Care is related to the following treatment goals of WANDER (acute kidney injury) ([facility] [address] [N17.9]    The Patient and/or patient representative Shawn Woodruff and her family were provided with a choice of provider and agrees with the discharge plan Yes    Freedom of choice list was provided with basic dialogue that supports the patient's individualized plan of care/goals and shares the quality data associated with the providers.  Yes    Care Transitions patient: Yes    Samantha Duong RN  [facility] [employer] [facility]  Case Management Department  Ph: [phone]  Fax: [phone]

## 2020-09-06 NOTE — PROGRESS NOTES
Cholesterol Brother     Stroke Brother         hypertension        reports that she has been smoking. She has a 22.50 pack-year smoking history. She has never used smokeless tobacco. She reports that she does not drink alcohol or use drugs. Allergies:  Patient has no known allergies. Current Medications:    No current facility-administered medications for this encounter. Review of Systems:   14 point ROS obtained but were negative except mentioned in HPI      Physical exam:     Vitals:  /72   Pulse 94   Temp 98.2 °F (36.8 °C) (Oral)   Resp 15   Ht 5' 5\" (1.651 m)   Wt 101 lb 6.6 oz (46 kg)   SpO2 93%   BMI 16.88 kg/m²   Constitutional:  AA  Skin: no rash, turgor dry skin  Heent:  eomi, mmm  Neck: no bruits or jvd noted  Cardiovascular:  S1, S2 without m/r/g  Respiratory: CTA B without w/r/r  Abdomen:  +bs, soft, nt, nd  Ext: no  lower extremity edema  Psychiatric: mood and affect appropriate  Musculoskeletal:  Rom, muscular strength intact    Labs:  CBC:   Recent Labs     09/03/20 0523 09/04/20 0416 09/05/20 0430   WBC 1.1* 1.0* 0.9*   HGB 8.4* 7.3* 7.6*   PLT 52* 35* 35*     BMP:    Recent Labs     09/03/20 0523 09/03/20 2109 09/04/20 0416 09/04/20  1304   *   < > 145 142 139   K 3.6  --   --  3.4*  --    *  --   --  113*  --    CO2 19*  --   --  20*  --    BUN 18  --   --  12  --    CREATININE 1.0  --   --  0.8  --    GLUCOSE 153*  --   --  104*  --     < > = values in this interval not displayed. Ca/Mg/Phos:   Recent Labs     09/03/20 0523 09/04/20 0416   CALCIUM 8.0* 7.9*   MG 2.60*  --    PHOS  --  1.8*     Hepatic:   Recent Labs     09/04/20 0416   AST 10*   ALT 9*   BILITOT 0.3   ALKPHOS 68     Troponin: No results for input(s): TROPONINI in the last 72 hours. BNP: No results for input(s): BNP in the last 72 hours. Lipids: No results for input(s): CHOL, TRIG, HDL, LDLCALC, LABVLDL in the last 72 hours.   ABGs: No results for input(s): PHART, PO2ART, IQY3SIO in the last 72 hours. INR:   Recent Labs     09/03/20  0523   INR 1.09     UA:No results for input(s): Laren Pair, GLUCOSEU, BILIRUBINUR, Ernesto Roles, BLOODU, PHUR, PROTEINU, UROBILINOGEN, NITRU, LEUKOCYTESUR, Abdulaziz Cantu in the last 72 hours. Urine Microscopic: No results for input(s): LABCAST, BACTERIA, COMU, HYALCAST, WBCUA, RBCUA, EPIU in the last 72 hours. Urine Culture: No results for input(s): LABURIN in the last 72 hours. Urine Chemistry: No results for input(s): Mccall Quivers, PROTEINUR, NAUR in the last 72 hours. IMAGING:  FL MODIFIED BARIUM SWALLOW W VIDEO   Final Result      Normal swallowing study with no sign of any aspiration            XR CHEST (2 VW)   Final Result      No acute pulmonary pathology      Bony lesions consistent with patient's history of multiple myeloma                      I/O last 3 completed shifts: In: 0   Out: 300 [Urine:300]          Assessment/Plan :      1. Hypernatremia.    - Na better   - d/c IVF       2. HTN. On metoprolol    3. Anemia of chronic disease and MM     4.  Multiple myeloma. - per Dr Sera Winter     5. Failure to thrive.             Pt going with hospice                 Thank you for allowing us to participate in care of Arnulfo Olmos         Electronically signed by: Ryley Martinez MD, 9/6/2020, 12:39 AM      [facility] [address]  Office : [phone]  Fax :[phone]

## 2020-09-14 ENCOUNTER — OFFICE VISIT (OUTPATIENT)
Dept: INTERNAL MEDICINE CLINIC | Age: 70
End: 2020-09-14
Payer: MEDICARE

## 2020-09-14 VITALS
SYSTOLIC BLOOD PRESSURE: 116 MMHG | TEMPERATURE: 97.1 F | DIASTOLIC BLOOD PRESSURE: 74 MMHG | HEART RATE: 96 BPM | WEIGHT: 93 LBS | BODY MASS INDEX: 15.88 KG/M2 | HEIGHT: 64 IN

## 2020-09-14 PROCEDURE — 99213 OFFICE O/P EST LOW 20 MIN: CPT | Performed by: INTERNAL MEDICINE

## 2020-09-14 PROCEDURE — 4004F PT TOBACCO SCREEN RCVD TLK: CPT | Performed by: INTERNAL MEDICINE

## 2020-09-14 PROCEDURE — 4040F PNEUMOC VAC/ADMIN/RCVD: CPT | Performed by: INTERNAL MEDICINE

## 2020-09-14 PROCEDURE — 3017F COLORECTAL CA SCREEN DOC REV: CPT | Performed by: INTERNAL MEDICINE

## 2020-09-14 PROCEDURE — 1111F DSCHRG MED/CURRENT MED MERGE: CPT | Performed by: INTERNAL MEDICINE

## 2020-09-14 PROCEDURE — 1090F PRES/ABSN URINE INCON ASSESS: CPT | Performed by: INTERNAL MEDICINE

## 2020-09-14 PROCEDURE — G8399 PT W/DXA RESULTS DOCUMENT: HCPCS | Performed by: INTERNAL MEDICINE

## 2020-09-14 PROCEDURE — 1123F ACP DISCUSS/DSCN MKR DOCD: CPT | Performed by: INTERNAL MEDICINE

## 2020-09-14 PROCEDURE — G8418 CALC BMI BLW LOW PARAM F/U: HCPCS | Performed by: INTERNAL MEDICINE

## 2020-09-14 PROCEDURE — G8427 DOCREV CUR MEDS BY ELIG CLIN: HCPCS | Performed by: INTERNAL MEDICINE

## 2020-09-14 RX ORDER — ACYCLOVIR 400 MG/1
400 TABLET ORAL
COMMUNITY

## 2020-09-14 RX ORDER — ATORVASTATIN CALCIUM 40 MG/1
40 TABLET, FILM COATED ORAL DAILY
COMMUNITY
End: 2020-09-14

## 2020-09-14 RX ORDER — CALCIUM CARBONATE 500(1250)
500 TABLET ORAL DAILY
COMMUNITY
End: 2020-10-14 | Stop reason: CLARIF

## 2020-09-15 ENCOUNTER — TELEPHONE (OUTPATIENT)
Dept: INTERNAL MEDICINE CLINIC | Age: 70
End: 2020-09-15

## 2020-09-15 NOTE — TELEPHONE ENCOUNTER
Test Results Question     From   Sherwin Valerio  To   [facility] Practice Support  Sent   9/15/2020  9:40 AM    Good Morning   Just checking in on Mom test results     Thank you   Efren Garcia

## 2020-09-24 ENCOUNTER — HOSPITAL ENCOUNTER (OUTPATIENT)
Dept: ONCOLOGY | Age: 70
Setting detail: INFUSION SERIES
Discharge: HOME | End: 2020-09-24
Payer: MEDICARE

## 2020-09-24 LAB
ABO/RH: NORMAL
ANTIBODY SCREEN: NORMAL

## 2020-09-24 PROCEDURE — 86900 BLOOD TYPING SEROLOGIC ABO: CPT

## 2020-09-24 PROCEDURE — 86850 RBC ANTIBODY SCREEN: CPT

## 2020-09-24 PROCEDURE — 86923 COMPATIBILITY TEST ELECTRIC: CPT

## 2020-09-24 PROCEDURE — 86901 BLOOD TYPING SEROLOGIC RH(D): CPT

## 2020-09-24 PROCEDURE — P9040 RBC LEUKOREDUCED IRRADIATED: HCPCS

## 2020-09-24 NOTE — PROGRESS NOTES
Pt to [facility]PO for type and screen drawn peripherally. Pt tolerated well. Will return to OPO tomorrow for 1 unit PRBC.

## 2020-09-25 ENCOUNTER — HOSPITAL ENCOUNTER (OUTPATIENT)
Dept: ONCOLOGY | Age: 70
Setting detail: INFUSION SERIES
Discharge: HOME | End: 2020-09-25
Payer: MEDICARE

## 2020-09-25 VITALS
DIASTOLIC BLOOD PRESSURE: 64 MMHG | SYSTOLIC BLOOD PRESSURE: 110 MMHG | HEART RATE: 78 BPM | TEMPERATURE: 99 F | RESPIRATION RATE: 19 BRPM

## 2020-09-25 LAB
BLOOD BANK DISPENSE STATUS: NORMAL
BLOOD BANK PRODUCT CODE: NORMAL
BPU ID: NORMAL
DESCRIPTION BLOOD BANK: NORMAL

## 2020-09-25 PROCEDURE — 36430 TRANSFUSION BLD/BLD COMPNT: CPT

## 2020-09-25 PROCEDURE — P9040 RBC LEUKOREDUCED IRRADIATED: HCPCS

## 2020-09-25 PROCEDURE — 6370000000 HC RX 637 (ALT 250 FOR IP): Performed by: NURSE PRACTITIONER

## 2020-09-25 RX ORDER — 0.9 % SODIUM CHLORIDE 0.9 %
20 INTRAVENOUS SOLUTION INTRAVENOUS ONCE
Status: DISCONTINUED | OUTPATIENT
Start: 2020-09-25 | End: 2020-09-26 | Stop reason: HOSPADM

## 2020-09-25 RX ORDER — SENNA AND DOCUSATE SODIUM 50; 8.6 MG/1; MG/1
1 TABLET, FILM COATED ORAL 2 TIMES DAILY PRN
COMMUNITY

## 2020-09-25 RX ORDER — DIPHENHYDRAMINE HCL 25 MG
25 TABLET ORAL ONCE
Status: COMPLETED | OUTPATIENT
Start: 2020-09-25 | End: 2020-09-25

## 2020-09-25 RX ADMIN — DIPHENHYDRAMINE HYDROCHLORIDE 25 MG: 25 TABLET ORAL at 15:13

## 2020-10-06 ENCOUNTER — TELEPHONE (OUTPATIENT)
Dept: INTERNAL MEDICINE CLINIC | Age: 70
End: 2020-10-06

## 2020-10-12 ENCOUNTER — HOSPITAL ENCOUNTER (OUTPATIENT)
Dept: INTERVENTIONAL RADIOLOGY/VASCULAR | Age: 70
Discharge: HOME | End: 2020-10-12
Attending: RADIOLOGY | Admitting: RADIOLOGY
Payer: MEDICARE

## 2020-10-12 VITALS — TEMPERATURE: 98.4 F | BODY MASS INDEX: 19.63 KG/M2 | WEIGHT: 115 LBS | HEIGHT: 64 IN

## 2020-10-12 LAB
HCT VFR BLD CALC: 21.7 % (ref 36–48)
HEMOGLOBIN: 7.1 G/DL (ref 12–16)
INR BLD: 1.11 (ref 0.86–1.14)
MCH RBC QN AUTO: 32 PG (ref 26–34)
MCHC RBC AUTO-ENTMCNC: 32.6 G/DL (ref 31–36)
MCV RBC AUTO: 98.4 FL (ref 80–100)
PDW BLD-RTO: 20.6 % (ref 12.4–15.4)
PLATELET # BLD: 251 K/UL (ref 135–450)
PMV BLD AUTO: 7.5 FL (ref 5–10.5)
PROTHROMBIN TIME: 12.9 SEC (ref 10–13.2)
RBC # BLD: 2.21 M/UL (ref 4–5.2)
WBC # BLD: 6.6 K/UL (ref 4–11)

## 2020-10-12 PROCEDURE — 85027 COMPLETE CBC AUTOMATED: CPT

## 2020-10-12 PROCEDURE — 99152 MOD SED SAME PHYS/QHP 5/>YRS: CPT | Performed by: RADIOLOGY

## 2020-10-12 PROCEDURE — C1788 PORT, INDWELLING, IMP: HCPCS

## 2020-10-12 PROCEDURE — 77001 FLUOROGUIDE FOR VEIN DEVICE: CPT | Performed by: RADIOLOGY

## 2020-10-12 PROCEDURE — 6360000002 HC RX W HCPCS

## 2020-10-12 PROCEDURE — 36561 INSERT TUNNELED CV CATH: CPT | Performed by: RADIOLOGY

## 2020-10-12 PROCEDURE — 2500000003 HC RX 250 WO HCPCS

## 2020-10-12 PROCEDURE — C1894 INTRO/SHEATH, NON-LASER: HCPCS

## 2020-10-12 PROCEDURE — 85610 PROTHROMBIN TIME: CPT

## 2020-10-12 PROCEDURE — 76937 US GUIDE VASCULAR ACCESS: CPT

## 2020-10-12 RX ORDER — ATORVASTATIN CALCIUM 40 MG/1
40 TABLET, FILM COATED ORAL DAILY
COMMUNITY
End: 2020-10-14 | Stop reason: CLARIF

## 2020-10-12 ASSESSMENT — PAIN DESCRIPTION - PAIN TYPE: TYPE: ACUTE PAIN

## 2020-10-12 ASSESSMENT — PAIN DESCRIPTION - ORIENTATION: ORIENTATION: RIGHT

## 2020-10-12 ASSESSMENT — PAIN DESCRIPTION - LOCATION: LOCATION: FOOT

## 2020-10-12 NOTE — PROCEDURES
IR Brief Postoperative Note    Mickie Terrazas  YOB: 1950  [id number]    Pre-operative Diagnosis: myeloma    Post-operative Diagnosis: Same    Procedure: right ij chest port    Anesthesia: moderate    Surgeons/Assistants: rashida    Estimated Blood Loss: Minimal    Complications: none    Specimens: were not obtained    See full procedure dictation to follow      Bonnie Bruno MD  10/12/2020

## 2020-10-12 NOTE — H&P
awake)      pantoprazole (PROTONIX) 40 MG tablet TAKE 1 TABLET BY MOUTH ONCE DAILY IN THE MORNING BEFORE BREAKFAST 90 tablet 0    sennosides-docusate sodium (SENOKOT-S) 8.6-50 MG tablet Take 1 tablet by mouth 2 times daily as needed for Constipation      ondansetron (ZOFRAN) 8 MG tablet TAKE 1 TABLET BY MOUTH EVERY 8 HOURS AS NEEDED FOR NAUSEA AND VOMITING      acetaminophen (TYLENOL) 325 MG tablet Take 650 mg by mouth every 6 hours as needed for Pain         Current Meds  No current facility-administered medications for this encounter.          ASA 2 - Patient with mild systemic disease with no functional limitations    II (soft palate, uvula, fauces visible)    Activity:  2 - Able to move 4 extremities voluntarily on command  Respiration:  2 - Able to breathe deeply and cough freely  Circulation:  2 - BP+/- 20mmHg of normal  Consciousness:  2 - Fully awake  Oxygen Saturation (color):  2 - Able to maintain oxygen saturation >92% on room air    Sedation : Moderate sedation planned

## 2020-10-14 ENCOUNTER — OFFICE VISIT (OUTPATIENT)
Dept: INTERNAL MEDICINE CLINIC | Age: 70
End: 2020-10-14
Payer: MEDICARE

## 2020-10-14 VITALS
WEIGHT: 112 LBS | TEMPERATURE: 98.7 F | SYSTOLIC BLOOD PRESSURE: 136 MMHG | BODY MASS INDEX: 19.12 KG/M2 | DIASTOLIC BLOOD PRESSURE: 63 MMHG | HEIGHT: 64 IN

## 2020-10-14 PROCEDURE — G8484 FLU IMMUNIZE NO ADMIN: HCPCS | Performed by: INTERNAL MEDICINE

## 2020-10-14 PROCEDURE — G8399 PT W/DXA RESULTS DOCUMENT: HCPCS | Performed by: INTERNAL MEDICINE

## 2020-10-14 PROCEDURE — G8427 DOCREV CUR MEDS BY ELIG CLIN: HCPCS | Performed by: INTERNAL MEDICINE

## 2020-10-14 PROCEDURE — 4004F PT TOBACCO SCREEN RCVD TLK: CPT | Performed by: INTERNAL MEDICINE

## 2020-10-14 PROCEDURE — 1090F PRES/ABSN URINE INCON ASSESS: CPT | Performed by: INTERNAL MEDICINE

## 2020-10-14 PROCEDURE — G8420 CALC BMI NORM PARAMETERS: HCPCS | Performed by: INTERNAL MEDICINE

## 2020-10-14 PROCEDURE — 99213 OFFICE O/P EST LOW 20 MIN: CPT | Performed by: INTERNAL MEDICINE

## 2020-10-14 PROCEDURE — 4040F PNEUMOC VAC/ADMIN/RCVD: CPT | Performed by: INTERNAL MEDICINE

## 2020-10-14 PROCEDURE — 3017F COLORECTAL CA SCREEN DOC REV: CPT | Performed by: INTERNAL MEDICINE

## 2020-10-14 PROCEDURE — 1123F ACP DISCUSS/DSCN MKR DOCD: CPT | Performed by: INTERNAL MEDICINE

## 2020-10-14 RX ORDER — LORAZEPAM 0.5 MG/1
0.5 TABLET ORAL NIGHTLY
COMMUNITY

## 2020-10-14 RX ORDER — LENALIDOMIDE 5 MG/1
5 CAPSULE ORAL EVERY OTHER DAY
COMMUNITY

## 2020-10-14 NOTE — PROGRESS NOTES
Subjective:      Patient ID: Kelly Quinn is a 65 y.o. female with a PMH significant for ESRD, multiple myeloma, HTN, CAD, CVA, and anemia who presents with f/u after a nervous breakdown. HPI     2-3 weeks ago pt stated that she wanted to stop chemotherapy and begin hospice. However, pt has since decided to restart chemotherapy. She describes her mood as \"back to normal\". Appetite has returned. Pt notes pain in left leg/foot around her ankle that extends up to mid calf that she noticed 1 week ago. She rates pain as 10/10. She has tried tylenol which has not helped. Pt has bilateral swelling that also started 1 week ago. Pt reports increased urinary frequency. Previously was urinating 1-2x per night. In past two weeks, increased to 3-4x per night. Of note, pt had right IJ port placed two days prior. Pt reports no complications with the procedure. Review of Systems     No CP/palpitations. No SOB       Objective:     Physical Exam  Constitutional:       Appearance: She is underweight. Cardiovascular:      Rate and Rhythm: Normal rate and regular rhythm. Pulmonary:      Effort: Pulmonary effort is normal.      Breath sounds: Normal breath sounds and air entry. Musculoskeletal:      Comments: Pt has bilateral 2+edema extending up to mid-calf. Pt has tenderness to palpiation by left ankle that extends to mid-calf. No erythema present. Neurological:      Mental Status: She is alert.          Assessment:      Multiple myeloma currently getting chemotherapy   Inflammatory arthropathy   Plan:      Trial of Voltaren gel        Gabriela Kunz MD